# Patient Record
Sex: FEMALE | Race: WHITE | Employment: UNEMPLOYED
[De-identification: names, ages, dates, MRNs, and addresses within clinical notes are randomized per-mention and may not be internally consistent; named-entity substitution may affect disease eponyms.]

---

## 2017-02-22 ENCOUNTER — TELEPHONE (OUTPATIENT)
Dept: INTERNAL MEDICINE | Facility: CLINIC | Age: 57
End: 2017-02-22

## 2017-02-22 DIAGNOSIS — I50.20 SYSTOLIC CONGESTIVE HEART FAILURE, UNSPECIFIED CONGESTIVE HEART FAILURE CHRONICITY: ICD-10-CM

## 2017-02-22 DIAGNOSIS — Z86.79 H/O ESSENTIAL HYPERTENSION: Primary | ICD-10-CM

## 2017-10-16 ENCOUNTER — OFFICE VISIT (OUTPATIENT)
Dept: INTERNAL MEDICINE | Age: 57
End: 2017-10-16
Payer: COMMERCIAL

## 2017-10-16 VITALS
RESPIRATION RATE: 12 BRPM | HEART RATE: 100 BPM | BODY MASS INDEX: 18.55 KG/M2 | SYSTOLIC BLOOD PRESSURE: 192 MMHG | OXYGEN SATURATION: 98 % | DIASTOLIC BLOOD PRESSURE: 105 MMHG | WEIGHT: 100.8 LBS | HEIGHT: 62 IN

## 2017-10-16 DIAGNOSIS — I67.9: ICD-10-CM

## 2017-10-16 DIAGNOSIS — Z12.11 SCREENING FOR COLON CANCER: ICD-10-CM

## 2017-10-16 DIAGNOSIS — N39.41 URINARY INCONTINENCE, URGE: ICD-10-CM

## 2017-10-16 DIAGNOSIS — Z13.220 SCREENING FOR HYPERLIPIDEMIA: ICD-10-CM

## 2017-10-16 DIAGNOSIS — G81.91: ICD-10-CM

## 2017-10-16 DIAGNOSIS — Z86.79 H/O ESSENTIAL HYPERTENSION: ICD-10-CM

## 2017-10-16 DIAGNOSIS — I63.9 CEREBRAL INFARCTION, UNSPECIFIED MECHANISM (HCC): Primary | ICD-10-CM

## 2017-10-16 DIAGNOSIS — Z12.31 ENCOUNTER FOR SCREENING MAMMOGRAM FOR BREAST CANCER: ICD-10-CM

## 2017-10-16 DIAGNOSIS — Z00.00 WELL ADULT EXAM: ICD-10-CM

## 2017-10-16 PROCEDURE — 99214 OFFICE O/P EST MOD 30 MIN: CPT | Performed by: INTERNAL MEDICINE

## 2017-10-16 RX ORDER — METOPROLOL SUCCINATE 50 MG/1
50 TABLET, EXTENDED RELEASE ORAL DAILY
Qty: 30 TABLET | Refills: 3 | Status: SHIPPED | OUTPATIENT
Start: 2017-10-16 | End: 2018-02-12 | Stop reason: SDUPTHER

## 2017-10-16 RX ORDER — GABAPENTIN 300 MG/1
300 CAPSULE ORAL 3 TIMES DAILY
Qty: 90 CAPSULE | Refills: 3 | Status: ON HOLD | OUTPATIENT
Start: 2017-10-16 | End: 2018-09-08

## 2017-10-16 RX ORDER — UNDERPADS 23" X 36"
EACH MISCELLANEOUS
Qty: 5 PACKAGE | Refills: 11 | Status: ON HOLD | OUTPATIENT
Start: 2017-10-16 | End: 2018-09-08

## 2017-10-16 ASSESSMENT — PATIENT HEALTH QUESTIONNAIRE - PHQ9
2. FEELING DOWN, DEPRESSED OR HOPELESS: 0
SUM OF ALL RESPONSES TO PHQ9 QUESTIONS 1 & 2: 0
SUM OF ALL RESPONSES TO PHQ QUESTIONS 1-9: 0
1. LITTLE INTEREST OR PLEASURE IN DOING THINGS: 0

## 2017-10-16 ASSESSMENT — ENCOUNTER SYMPTOMS
EYES NEGATIVE: 1
ALLERGIC/IMMUNOLOGIC NEGATIVE: 1

## 2017-10-16 NOTE — PROGRESS NOTES
[facility] INTERNAL MEDICINE [place] [address] [address]  [address]  [address] [address]  Dept: [phone]  Dept Fax: [phone]    Katlin Gross is a 49 y.o. female who presents today for   Chief Complaint   Patient presents with    Hypertension     BR Check     and follow up of chronic medical problems:   Patient Active Problem List   Diagnosis    Cerebral infarction ([facility]    Somnolence    CHF (congestive heart failure) ([facility]    Heroin overdose    Lactic acidosis    Hyperglycemia    Acute respiratory failure ([facility]    H/O essential hypertension    Smoker    Seizure-like activity (HCC)    Bilious vomiting with nausea   . Past Medical History:   Diagnosis Date    CAD (coronary artery disease)     CHF (congestive heart failure) (HCC)     Hypertension     Unspecified cerebral artery occlusion with cerebral infarction ([facility]        No past surgical history on file. No family history on file.     Social History   Substance Use Topics    Smoking status: Current Every Day Smoker     Packs/day: 2.00     Types: Cigarettes    Smokeless tobacco: Not on file    Alcohol use No      Current Outpatient Prescriptions   Medication Sig Dispense Refill    metoprolol succinate (TOPROL XL) 25 MG extended release tablet Take 0.5 tablets by mouth daily 30 tablet 3    docusate (COLACE, DULCOLAX) 100 MG CAPS Take 100 mg by mouth 2 times daily as needed (constipation) 60 capsule 3    levETIRAcetam (KEPPRA) 500 MG tablet Take 1 tablet by mouth 2 times daily 60 tablet 3    famotidine (PEPCID) 20 MG tablet Take 1 tablet by mouth daily 30 tablet 3    atorvastatin (LIPITOR) 80 MG tablet Take 0.5 tablets by mouth nightly 30 tablet 3    metFORMIN (GLUCOPHAGE) 500 MG tablet Take 1 tablet by mouth daily (with breakfast) 30 tablet 3    aspirin 81 MG chewable tablet Take 1 tablet by mouth daily 30 tablet 3    oxybutynin (DITROPAN-XL) 5 MG CR tablet Take 1 tablet by mouth nightly 30 tablet 3     No current facility-administered medications for this visit. No Known Allergies    Health Maintenance   Topic Date Due    Diabetic foot exam  11/18/1972    Diabetic microalbuminuria test  11/18/1980    Pneumococcal med risk (1 of 1 - PPSV23) 11/18/1981    Breast cancer screen  11/18/2012    Colon cancer screen colonoscopy  11/18/2012    Lipid screen  07/17/2016    Diabetic hemoglobin A1C test  02/07/2017    Flu vaccine (1) 09/01/2017    Diabetic retinal exam  10/13/2018 (Originally 11/18/1972)    DTaP/Tdap/Td vaccine (1 - Tdap) 10/13/2018 (Originally 11/18/1981)    Cervical cancer screen  10/13/2018 (Originally 11/18/1983)    HIV screen  10/13/2018 (Originally 11/18/1977)    Hepatitis C screen  Completed       Controlled Substances Monitoring:      HPI:     Hypertension   This is a chronic problem. The current episode started more than 1 year ago. The problem has been gradually worsening since onset. The problem is uncontrolled. There are no associated agents to hypertension. Risk factors for coronary artery disease include dyslipidemia. Past treatments include beta blockers. The current treatment provides no improvement. Compliance problems include psychosocial issues (non compliant with visits). Hypertensive end-organ damage includes CVA. Arm Pain    The incident occurred more than 1 week ago (3 months). There was no injury mechanism (old CVA with right hemiparesis). The pain is present in the right hand. Quality: unable to describe. The pain does not radiate. The pain is moderate. The pain has been constant since the incident. Associated symptoms include muscle weakness. Associated symptoms comments: See above. . The symptoms are aggravated by palpation. She has tried NSAIDs and acetaminophen for the symptoms. The treatment provided no relief. Stopped all her medications because they made her feel bad. Has not been seen in a year. Incontinent of urine. Urge type.     ROS:     Review of Systems   Constitutional: Negative. Eyes: Negative. Endocrine: Negative. Allergic/Immunologic: Negative. All other systems reviewed and are negative. Objective:     Physical Exam   Constitutional: She appears well-developed and well-nourished. She appears lethargic. HENT:   Head: Normocephalic and atraumatic. Neck: Neck supple. Cardiovascular: Normal rate and regular rhythm. Pulmonary/Chest: Effort normal and breath sounds normal.   Abdominal: Soft. Musculoskeletal: She exhibits no edema. Neurological: She appears lethargic. She displays atrophy. She exhibits abnormal muscle tone (right arm ). Gait abnormal.   Reflex Scores:       Tricep reflexes are 4+ on the right side and 2+ on the left side. Bicep reflexes are 4+ on the right side and 2+ on the left side. Brachioradialis reflexes are 3+ on the right side and 2+ on the left side. Possible  Aphasia. Friend does the talking. Right arm is atrophic. Minimal contractures. Skin: Skin is warm and dry. Psychiatric: She has a normal mood and affect. Her behavior is normal.   Vitals reviewed. Visit Information    Have you changed or started any medications since your last visit including any over-the-counter medicines, vitamins, or herbal medicines? no   Are you having any side effects from any of your medications? -  no  Have you stopped taking any of your medications? Is so, why? -  no    Have you seen any other physician or provider since your last visit? No  Have you had any other diagnostic tests since your last visit? No  Have you been seen in the emergency room and/or had an admission to a hospital since we last saw you? No  Have you had your routine dental cleaning in the past 6 months? yes -     Have you activated your [employer] account? If not, what are your barriers?  Yes     Patient Care Team:  Yenny Esqueda MD as PCP - General (Internal Medicine)    Medical History Review  Past Medical, Family, and Social History reviewed and does contribute to the patient presenting condition    Health Maintenance   Topic Date Due    Diabetic foot exam  11/18/1972    Diabetic microalbuminuria test  11/18/1980    Pneumococcal med risk (1 of 1 - PPSV23) 11/18/1981    Breast cancer screen  11/18/2012    Colon cancer screen colonoscopy  11/18/2012    Lipid screen  07/17/2016    Diabetic hemoglobin A1C test  02/07/2017    Flu vaccine (1) 09/01/2017    Diabetic retinal exam  10/13/2018 (Originally 11/18/1972)    DTaP/Tdap/Td vaccine (1 - Tdap) 10/13/2018 (Originally 11/18/1981)    Cervical cancer screen  10/13/2018 (Originally 11/18/1983)    HIV screen  10/13/2018 (Originally 11/18/1977)    Hepatitis C screen  Completed     BP (!) 192/105   Pulse 100   Resp 12   Ht 5' 2\" (1.575 m)   Wt 100 lb 12.8 oz (45.7 kg)   LMP  (LMP Unknown)   SpO2 98%   Breastfeeding? No   BMI 18.44 kg/m²     Assessment:      1. Well adult exam  CANCELED: HM DIABETES FOOT EXAM    CANCELED: POCT glycosylated hemoglobin (Hb A1C)    CANCELED: Microalbumin, Ur   2. Encounter for screening mammogram for breast cancer  SIMBA Digital Screen Bilateral [[id number]]   3. Screening for colon cancer  POCT FECAL IMMUNOCHEMICAL TEST (FIT)   4. Screening for hyperlipidemia  Lipid Panel       Plan:       1. Chucky Mi received counseling on the following healthy behaviors: medication adherence    2. Prior labs and health maintenance reviewed. 3.  Discussed use, benefit, and side effects of prescribed medications. Barriers to medication compliance addressed. All her questions were answered. Pt voiced understanding. Chucky Mi will continue current medications, diet and exercise. No orders of the defined types were placed in this encounter. Completed Refills               Requested Prescriptions      No prescriptions requested or ordered in this encounter       4. Patient given educational materials - see patient instructions    5.  Was a self-tracking handout given in paper form or via [email or web address]? No  If yes, see orders or list here. Orders Placed This Encounter   Procedures    SIMBA Digital Screen Bilateral [id number]     Standing Status:   Future     Standing Expiration Date:   10/13/2018     Order Specific Question:   Reason for exam:     Answer:   breast cancer screening    Lipid Panel     Standing Status:   Future     Standing Expiration Date:   10/13/2018     Order Specific Question:   Is Patient Fasting?/# of Hours     Answer:   12 hours    POCT FECAL IMMUNOCHEMICAL TEST (FIT)     Standing Status:   Future     Standing Expiration Date:   10/13/2018       No Follow-up on file. Patient agreed with treatment plan.     Electronically signed by Alex Rodriguez MD on 10/16/2017 at 2:12 PM

## 2017-10-25 ENCOUNTER — TELEPHONE (OUTPATIENT)
Dept: INTERNAL MEDICINE | Age: 57
End: 2017-10-25

## 2017-10-25 RX ORDER — ACETAMINOPHEN AND CODEINE PHOSPHATE 300; 30 MG/1; MG/1
1 TABLET ORAL EVERY 6 HOURS PRN
Qty: 40 TABLET | Refills: 0 | Status: ON HOLD | OUTPATIENT
Start: 2017-10-25 | End: 2018-09-08

## 2017-10-25 NOTE — TELEPHONE ENCOUNTER
Patient care giver is asking for stronger pain medication than Gabapentin RX, Care giver states that she does have appt with neuro at the end of December, but hates seeing patient in so much pain, Please Advise          Health Maintenance   Topic Date Due    Breast cancer screen  11/18/2012    Lipid screen  07/17/2016    Flu vaccine (1) 01/16/2018 (Originally 9/1/2017)    Diabetic foot exam  04/16/2018 (Originally 11/18/1972)    Pneumococcal med risk (1 of 1 - PPSV23) 04/16/2018 (Originally 11/18/1981)    Colon cancer screen colonoscopy  04/16/2018 (Originally 11/18/2012)    Diabetic retinal exam  10/13/2018 (Originally 11/18/1972)    DTaP/Tdap/Td vaccine (1 - Tdap) 10/13/2018 (Originally 11/18/1981)    Cervical cancer screen  10/13/2018 (Originally 11/18/1983)    HIV screen  10/13/2018 (Originally 11/18/1977)    Diabetic hemoglobin A1C test  10/16/2018 (Originally 2/7/2017)    Diabetic microalbuminuria test  10/16/2018 (Originally 11/18/1980)    Hepatitis C screen  Completed             (applicable per patient's age: Cancer Screenings, Depression Screening, Fall Risk Screening, Immunizations)    Hemoglobin A1C (%)   Date Value   02/07/2016 6.0   07/17/2015 5.9     LDL Cholesterol (mg/dL)   Date Value   07/17/2015 78     AST (U/L)   Date Value   09/19/2016 23     ALT (U/L)   Date Value   09/19/2016 10     BUN (mg/dL)   Date Value   09/19/2016 12      (goal A1C is < 7)   (goal LDL is <100) need 30-50% reduction from baseline     BP Readings from Last 3 Encounters:   10/16/17 (!) 192/105   09/15/16 161/90   04/19/16 131/72    (goal /80)      All Future Testing planned in [facility]:  Lab Frequency Next Occurrence   SIMBA Digital Screen Bilateral [[id number]] Once 11/12/2017   POCT FECAL IMMUNOCHEMICAL TEST (FIT) Once 11/12/2017   Lipid Panel Once 11/12/2017       Next Visit Date:  Future Appointments  Date Time Provider Evita Caban   12/21/2017 1:00 PM Irving Villa MD Neuro Spec Cori Levo

## 2017-11-08 RX ORDER — ASPIRIN 81 MG/1
81 TABLET, CHEWABLE ORAL DAILY
Qty: 30 TABLET | Refills: 3 | Status: ON HOLD | OUTPATIENT
Start: 2017-11-08 | End: 2018-09-08

## 2018-01-09 ENCOUNTER — TELEPHONE (OUTPATIENT)
Dept: INTERNAL MEDICINE | Age: 58
End: 2018-01-09

## 2018-01-09 DIAGNOSIS — Z86.79 H/O ESSENTIAL HYPERTENSION: ICD-10-CM

## 2018-01-09 DIAGNOSIS — R73.9 HYPERGLYCEMIA: Primary | ICD-10-CM

## 2018-01-09 NOTE — LETTER
[facility] Internal Medicine [facility] [address]  [address] [place] [address] [address] [place] [address] [address]  Phone: [phone]  Fax: [phone]    Caro Langley MD        January 9, 2018     [address] [place] [id number]      Dear Kerry Mcbride: This letter is a reminder that your Hgb A1C test is due. Please call our office to schedule an appointment. If you've already underwent or scheduled this procedure, please disregard this notice.     Sincerely,        Caro Langley MD

## 2018-02-12 NOTE — TELEPHONE ENCOUNTER
Next Visit Date:  No future appointments.     Health Maintenance   Topic Date Due    Breast cancer screen  11/18/2012    Lipid screen  07/17/2016    Flu vaccine (1) 09/01/2017    Smoker: low dose lung CT screening  11/18/2017    Diabetic foot exam  04/16/2018 (Originally 11/18/1972)    Pneumococcal med risk (1 of 1 - PPSV23) 04/16/2018 (Originally 11/18/1981)    Colon cancer screen colonoscopy  04/16/2018 (Originally 11/18/2012)    Diabetic retinal exam  10/13/2018 (Originally 11/18/1972)    DTaP/Tdap/Td vaccine (1 - Tdap) 10/13/2018 (Originally 11/18/1981)    Cervical cancer screen  10/13/2018 (Originally 11/18/1983)    HIV screen  10/13/2018 (Originally 11/18/1977)    A1C test (Diabetic or Prediabetic)  10/16/2018 (Originally 2/7/2017)    Diabetic microalbuminuria test  10/16/2018 (Originally 11/18/1980)    Hepatitis C screen  Completed       Hemoglobin A1C (%)   Date Value   02/07/2016 6.0   07/17/2015 5.9             ( goal A1C is < 7)   No results found for: LABMICR  LDL Cholesterol (mg/dL)   Date Value   07/17/2015 78       (goal LDL is <100)   AST (U/L)   Date Value   09/19/2016 23     ALT (U/L)   Date Value   09/19/2016 10     BUN (mg/dL)   Date Value   09/19/2016 12     BP Readings from Last 3 Encounters:   10/16/17 (!) 192/105   09/15/16 161/90   04/19/16 131/72          (goal 120/80)    All Future Testing planned in [facility]  Lab Frequency Next Occurrence   SIMBA Digital Screen Bilateral [[id number]] Once 06/27/2018   POCT FECAL IMMUNOCHEMICAL TEST (FIT) Once 06/28/2018   Lipid Panel Once 09/13/2018   Hemoglobin A1C Once 04/19/2018   Microalbumin, Ur Once 04/19/2018               Patient Active Problem List:     Cerebral infarction ([address]     Somnolence     CHF (congestive heart failure) (HCC)     Heroin overdose     Lactic acidosis     Hyperglycemia     Acute respiratory failure (HCC)     H/O essential hypertension     Smoker     Seizure-like activity (HCC)     Bilious vomiting with nausea

## 2018-02-13 RX ORDER — METOPROLOL SUCCINATE 50 MG/1
50 TABLET, EXTENDED RELEASE ORAL DAILY
Qty: 30 TABLET | Refills: 3 | Status: ON HOLD | OUTPATIENT
Start: 2018-02-13 | End: 2018-09-08

## 2018-02-15 RX ORDER — METOPROLOL SUCCINATE 50 MG/1
TABLET, EXTENDED RELEASE ORAL
Qty: 30 TABLET | Refills: 3 | Status: SHIPPED | OUTPATIENT
Start: 2018-02-15 | End: 2018-06-22 | Stop reason: SDUPTHER

## 2018-06-22 RX ORDER — METOPROLOL SUCCINATE 50 MG/1
TABLET, EXTENDED RELEASE ORAL
Qty: 30 TABLET | Refills: 3 | Status: ON HOLD | OUTPATIENT
Start: 2018-06-22 | End: 2018-09-08

## 2018-09-02 ENCOUNTER — APPOINTMENT (OUTPATIENT)
Dept: GENERAL RADIOLOGY | Age: 58
DRG: 816 | End: 2018-09-02
Payer: COMMERCIAL

## 2018-09-02 ENCOUNTER — APPOINTMENT (OUTPATIENT)
Dept: CT IMAGING | Age: 58
DRG: 816 | End: 2018-09-02
Payer: COMMERCIAL

## 2018-09-02 ENCOUNTER — HOSPITAL ENCOUNTER (INPATIENT)
Age: 58
LOS: 7 days | Discharge: HOSPICE | DRG: 816 | End: 2018-09-09
Attending: EMERGENCY MEDICINE | Admitting: PSYCHIATRY & NEUROLOGY
Payer: COMMERCIAL

## 2018-09-02 DIAGNOSIS — T50.904A DRUG OVERDOSE, UNDETERMINED INTENT, INITIAL ENCOUNTER: ICD-10-CM

## 2018-09-02 DIAGNOSIS — I63.9 CEREBROVASCULAR ACCIDENT (CVA), UNSPECIFIED MECHANISM (HCC): Primary | ICD-10-CM

## 2018-09-02 LAB
% CKMB: 3.2 % (ref 0–3)
-: ABNORMAL
ABSOLUTE EOS #: <0.03 K/UL (ref 0–0.44)
ABSOLUTE EOS #: <0.03 K/UL (ref 0–0.44)
ABSOLUTE IMMATURE GRANULOCYTE: 0.08 K/UL (ref 0–0.3)
ABSOLUTE IMMATURE GRANULOCYTE: 0.12 K/UL (ref 0–0.3)
ABSOLUTE LYMPH #: 1.24 K/UL (ref 1.1–3.7)
ABSOLUTE LYMPH #: 1.24 K/UL (ref 1.1–3.7)
ABSOLUTE MONO #: 0.87 K/UL (ref 0.1–1.2)
ABSOLUTE MONO #: 0.92 K/UL (ref 0.1–1.2)
ALBUMIN SERPL-MCNC: 3.9 G/DL (ref 3.5–5.2)
ALBUMIN/GLOBULIN RATIO: 1.1 (ref 1–2.5)
ALLEN TEST: ABNORMAL
ALLEN TEST: POSITIVE
ALP BLD-CCNC: 101 U/L (ref 35–104)
ALT SERPL-CCNC: 6 U/L (ref 5–33)
AMORPHOUS: ABNORMAL
ANION GAP SERPL CALCULATED.3IONS-SCNC: 20 MMOL/L (ref 9–17)
ANION GAP: 13 MMOL/L (ref 7–16)
AST SERPL-CCNC: 14 U/L
BACTERIA: ABNORMAL
BASOPHILS # BLD: 0 % (ref 0–2)
BASOPHILS # BLD: 0 % (ref 0–2)
BASOPHILS ABSOLUTE: 0.03 K/UL (ref 0–0.2)
BASOPHILS ABSOLUTE: 0.05 K/UL (ref 0–0.2)
BILIRUB SERPL-MCNC: 0.69 MG/DL (ref 0.3–1.2)
BILIRUBIN DIRECT: 0.21 MG/DL
BILIRUBIN URINE: NEGATIVE
BILIRUBIN, INDIRECT: 0.48 MG/DL (ref 0–1)
BUN BLDV-MCNC: 11 MG/DL (ref 6–20)
BUN/CREAT BLD: ABNORMAL (ref 9–20)
CALCIUM SERPL-MCNC: 9.3 MG/DL (ref 8.6–10.4)
CASTS UA: ABNORMAL /LPF (ref 0–8)
CHLORIDE BLD-SCNC: 98 MMOL/L (ref 98–107)
CK MB: 3.1 NG/ML
CKMB INTERPRETATION: ABNORMAL
CO2: 17 MMOL/L (ref 20–31)
COLOR: YELLOW
CREAT SERPL-MCNC: 0.65 MG/DL (ref 0.5–0.9)
CRYSTALS, UA: ABNORMAL /HPF
DIFFERENTIAL TYPE: ABNORMAL
DIFFERENTIAL TYPE: ABNORMAL
EKG ATRIAL RATE: 111 BPM
EKG P AXIS: 85 DEGREES
EKG P-R INTERVAL: 162 MS
EKG Q-T INTERVAL: 354 MS
EKG QRS DURATION: 94 MS
EKG QTC CALCULATION (BAZETT): 481 MS
EKG R AXIS: 44 DEGREES
EKG T AXIS: 99 DEGREES
EKG VENTRICULAR RATE: 111 BPM
EOSINOPHILS RELATIVE PERCENT: 0 % (ref 1–4)
EOSINOPHILS RELATIVE PERCENT: 0 % (ref 1–4)
EPITHELIAL CELLS UA: ABNORMAL /HPF (ref 0–5)
FIO2: 30
FIO2: ABNORMAL
GFR AFRICAN AMERICAN: >60 ML/MIN
GFR NON-AFRICAN AMERICAN: >60 ML/MIN
GFR NON-AFRICAN AMERICAN: >60 ML/MIN
GFR SERPL CREATININE-BSD FRML MDRD: >60 ML/MIN
GFR SERPL CREATININE-BSD FRML MDRD: ABNORMAL ML/MIN/{1.73_M2}
GFR SERPL CREATININE-BSD FRML MDRD: ABNORMAL ML/MIN/{1.73_M2}
GFR SERPL CREATININE-BSD FRML MDRD: NORMAL ML/MIN/{1.73_M2}
GLOBULIN: NORMAL G/DL (ref 1.5–3.8)
GLUCOSE BLD-MCNC: 296 MG/DL (ref 70–99)
GLUCOSE BLD-MCNC: 317 MG/DL (ref 74–100)
GLUCOSE URINE: ABNORMAL
HAV IGM SER IA-ACNC: ABNORMAL
HCO3 VENOUS: 24.8 MMOL/L (ref 22–29)
HCT VFR BLD CALC: 44.6 % (ref 36.3–47.1)
HCT VFR BLD CALC: 44.6 % (ref 36.3–47.1)
HEMOGLOBIN: 14.9 G/DL (ref 11.9–15.1)
HEMOGLOBIN: 15 G/DL (ref 11.9–15.1)
HEPATITIS B CORE IGM ANTIBODY: NONREACTIVE
HEPATITIS B SURFACE ANTIGEN: NONREACTIVE
HEPATITIS C ANTIBODY: REACTIVE
IMMATURE GRANULOCYTES: 0 %
IMMATURE GRANULOCYTES: 1 %
INR BLD: 1.1
INR BLD: 1.1
KEPPRA: <2 UG/ML
KETONES, URINE: NEGATIVE
LACTIC ACID, WHOLE BLOOD: 4.5 MMOL/L (ref 0.7–2.1)
LACTIC ACID: ABNORMAL MMOL/L
LEUKOCYTE ESTERASE, URINE: ABNORMAL
LYMPHOCYTES # BLD: 6 % (ref 24–43)
LYMPHOCYTES # BLD: 6 % (ref 24–43)
MCH RBC QN AUTO: 31.6 PG (ref 25.2–33.5)
MCH RBC QN AUTO: 31.7 PG (ref 25.2–33.5)
MCHC RBC AUTO-ENTMCNC: 33.4 G/DL (ref 28.4–34.8)
MCHC RBC AUTO-ENTMCNC: 33.6 G/DL (ref 28.4–34.8)
MCV RBC AUTO: 93.9 FL (ref 82.6–102.9)
MCV RBC AUTO: 94.9 FL (ref 82.6–102.9)
MODE: ABNORMAL
MODE: ABNORMAL
MONOCYTES # BLD: 4 % (ref 3–12)
MONOCYTES # BLD: 5 % (ref 3–12)
MUCUS: ABNORMAL
MYOGLOBIN: 245 NG/ML (ref 25–58)
NEGATIVE BASE EXCESS, ART: 3 (ref 0–2)
NEGATIVE BASE EXCESS, VEN: 2 (ref 0–2)
NITRITE, URINE: NEGATIVE
NRBC AUTOMATED: 0 PER 100 WBC
NRBC AUTOMATED: 0 PER 100 WBC
O2 DEVICE/FLOW/%: ABNORMAL
O2 DEVICE/FLOW/%: ABNORMAL
O2 SAT, VEN: 49 % (ref 60–85)
OTHER OBSERVATIONS UA: ABNORMAL
PARTIAL THROMBOPLASTIN TIME: 21.4 SEC (ref 20.5–30.5)
PARTIAL THROMBOPLASTIN TIME: 22.2 SEC (ref 20.5–30.5)
PATIENT TEMP: ABNORMAL
PATIENT TEMP: ABNORMAL
PCO2, VEN: 48.4 MM HG (ref 41–51)
PDW BLD-RTO: 13.3 % (ref 11.8–14.4)
PDW BLD-RTO: 13.4 % (ref 11.8–14.4)
PH UA: 6.5 (ref 5–8)
PH VENOUS: 7.32 (ref 7.32–7.43)
PLATELET # BLD: 302 K/UL (ref 138–453)
PLATELET # BLD: 308 K/UL (ref 138–453)
PLATELET ESTIMATE: ABNORMAL
PLATELET ESTIMATE: ABNORMAL
PMV BLD AUTO: 9.6 FL (ref 8.1–13.5)
PMV BLD AUTO: 9.7 FL (ref 8.1–13.5)
PO2, VEN: 28.9 MM HG (ref 30–50)
POC CHLORIDE: 102 MMOL/L (ref 98–107)
POC CREATININE: 0.63 MG/DL (ref 0.51–1.19)
POC HCO3: 20.8 MMOL/L (ref 21–28)
POC HEMATOCRIT: 52 % (ref 36–46)
POC HEMOGLOBIN: 17.6 G/DL (ref 12–16)
POC IONIZED CALCIUM: 1.22 MMOL/L (ref 1.15–1.33)
POC LACTIC ACID: 5.33 MMOL/L (ref 0.56–1.39)
POC O2 SATURATION: 98 % (ref 94–98)
POC PCO2 TEMP: ABNORMAL MM HG
POC PCO2 TEMP: ABNORMAL MM HG
POC PCO2: 32.2 MM HG (ref 35–48)
POC PH TEMP: ABNORMAL
POC PH TEMP: ABNORMAL
POC PH: 7.42 (ref 7.35–7.45)
POC PO2 TEMP: ABNORMAL MM HG
POC PO2 TEMP: ABNORMAL MM HG
POC PO2: 106.8 MM HG (ref 83–108)
POC POTASSIUM: 3.4 MMOL/L (ref 3.5–4.5)
POC SODIUM: 140 MMOL/L (ref 138–146)
POC TROPONIN I: 0.05 NG/ML (ref 0–0.1)
POC TROPONIN INTERP: NORMAL
POSITIVE BASE EXCESS, ART: ABNORMAL (ref 0–3)
POSITIVE BASE EXCESS, VEN: ABNORMAL (ref 0–3)
POTASSIUM SERPL-SCNC: 3.5 MMOL/L (ref 3.7–5.3)
PROTEIN UA: NEGATIVE
PROTHROMBIN TIME: 11.2 SEC (ref 9–12)
PROTHROMBIN TIME: 11.3 SEC (ref 9–12)
RBC # BLD: 4.7 M/UL (ref 3.95–5.11)
RBC # BLD: 4.75 M/UL (ref 3.95–5.11)
RBC # BLD: ABNORMAL 10*6/UL
RBC # BLD: ABNORMAL 10*6/UL
RBC UA: ABNORMAL /HPF (ref 0–4)
RENAL EPITHELIAL, UA: ABNORMAL /HPF
SAMPLE SITE: ABNORMAL
SAMPLE SITE: ABNORMAL
SEG NEUTROPHILS: 89 % (ref 36–65)
SEG NEUTROPHILS: 89 % (ref 36–65)
SEGMENTED NEUTROPHILS ABSOLUTE COUNT: 17.74 K/UL (ref 1.5–8.1)
SEGMENTED NEUTROPHILS ABSOLUTE COUNT: 17.89 K/UL (ref 1.5–8.1)
SERUM OSMOLALITY: 303 MOSM/KG (ref 275–295)
SODIUM BLD-SCNC: 135 MMOL/L (ref 135–144)
SPECIFIC GRAVITY UA: 1.02 (ref 1–1.03)
TCO2 (CALC), ART: 22 MMOL/L (ref 22–29)
TOTAL CK: 98 U/L (ref 26–192)
TOTAL CO2, VENOUS: 26 MMOL/L (ref 23–30)
TOTAL PROTEIN: 7.6 G/DL (ref 6.4–8.3)
TRICHOMONAS: ABNORMAL
TROPONIN INTERP: ABNORMAL
TROPONIN T: <0.03 NG/ML
TURBIDITY: CLEAR
URINE HGB: ABNORMAL
UROBILINOGEN, URINE: NORMAL
WBC # BLD: 20 K/UL (ref 3.5–11.3)
WBC # BLD: 20.2 K/UL (ref 3.5–11.3)
WBC # BLD: ABNORMAL 10*3/UL
WBC # BLD: ABNORMAL 10*3/UL
WBC UA: ABNORMAL /HPF (ref 0–5)
YEAST: ABNORMAL

## 2018-09-02 PROCEDURE — 2500000003 HC RX 250 WO HCPCS: Performed by: EMERGENCY MEDICINE

## 2018-09-02 PROCEDURE — 85610 PROTHROMBIN TIME: CPT

## 2018-09-02 PROCEDURE — 71045 X-RAY EXAM CHEST 1 VIEW: CPT

## 2018-09-02 PROCEDURE — 85014 HEMATOCRIT: CPT

## 2018-09-02 PROCEDURE — 83880 ASSAY OF NATRIURETIC PEPTIDE: CPT

## 2018-09-02 PROCEDURE — 83874 ASSAY OF MYOGLOBIN: CPT

## 2018-09-02 PROCEDURE — 94762 N-INVAS EAR/PLS OXIMTRY CONT: CPT

## 2018-09-02 PROCEDURE — 6360000002 HC RX W HCPCS

## 2018-09-02 PROCEDURE — 6360000002 HC RX W HCPCS: Performed by: EMERGENCY MEDICINE

## 2018-09-02 PROCEDURE — 99291 CRITICAL CARE FIRST HOUR: CPT

## 2018-09-02 PROCEDURE — 85730 THROMBOPLASTIN TIME PARTIAL: CPT

## 2018-09-02 PROCEDURE — 81001 URINALYSIS AUTO W/SCOPE: CPT

## 2018-09-02 PROCEDURE — 96375 TX/PRO/DX INJ NEW DRUG ADDON: CPT

## 2018-09-02 PROCEDURE — 82553 CREATINE MB FRACTION: CPT

## 2018-09-02 PROCEDURE — 5A1955Z RESPIRATORY VENTILATION, GREATER THAN 96 CONSECUTIVE HOURS: ICD-10-PCS | Performed by: PSYCHIATRY & NEUROLOGY

## 2018-09-02 PROCEDURE — 6360000004 HC RX CONTRAST MEDICATION: Performed by: EMERGENCY MEDICINE

## 2018-09-02 PROCEDURE — 2580000003 HC RX 258: Performed by: EMERGENCY MEDICINE

## 2018-09-02 PROCEDURE — 0BH18EZ INSERTION OF ENDOTRACHEAL AIRWAY INTO TRACHEA, VIA NATURAL OR ARTIFICIAL OPENING ENDOSCOPIC: ICD-10-PCS | Performed by: EMERGENCY MEDICINE

## 2018-09-02 PROCEDURE — 84484 ASSAY OF TROPONIN QUANT: CPT

## 2018-09-02 PROCEDURE — 94770 HC ETCO2 MONITOR DAILY: CPT

## 2018-09-02 PROCEDURE — 70450 CT HEAD/BRAIN W/O DYE: CPT

## 2018-09-02 PROCEDURE — 36600 WITHDRAWAL OF ARTERIAL BLOOD: CPT

## 2018-09-02 PROCEDURE — 80048 BASIC METABOLIC PNL TOTAL CA: CPT

## 2018-09-02 PROCEDURE — 82550 ASSAY OF CK (CPK): CPT

## 2018-09-02 PROCEDURE — 36680 INSERT NEEDLE BONE CAVITY: CPT

## 2018-09-02 PROCEDURE — 94002 VENT MGMT INPAT INIT DAY: CPT

## 2018-09-02 PROCEDURE — 82803 BLOOD GASES ANY COMBINATION: CPT

## 2018-09-02 PROCEDURE — 93005 ELECTROCARDIOGRAM TRACING: CPT

## 2018-09-02 PROCEDURE — 31500 INSERT EMERGENCY AIRWAY: CPT

## 2018-09-02 PROCEDURE — 87040 BLOOD CULTURE FOR BACTERIA: CPT

## 2018-09-02 PROCEDURE — 82947 ASSAY GLUCOSE BLOOD QUANT: CPT

## 2018-09-02 PROCEDURE — 80177 DRUG SCRN QUAN LEVETIRACETAM: CPT

## 2018-09-02 PROCEDURE — 80076 HEPATIC FUNCTION PANEL: CPT

## 2018-09-02 PROCEDURE — 70496 CT ANGIOGRAPHY HEAD: CPT

## 2018-09-02 PROCEDURE — 84295 ASSAY OF SERUM SODIUM: CPT

## 2018-09-02 PROCEDURE — 70498 CT ANGIOGRAPHY NECK: CPT

## 2018-09-02 PROCEDURE — 2500000003 HC RX 250 WO HCPCS

## 2018-09-02 PROCEDURE — 36556 INSERT NON-TUNNEL CV CATH: CPT

## 2018-09-02 PROCEDURE — 51702 INSERT TEMP BLADDER CATH: CPT

## 2018-09-02 PROCEDURE — 82435 ASSAY OF BLOOD CHLORIDE: CPT

## 2018-09-02 PROCEDURE — 80074 ACUTE HEPATITIS PANEL: CPT

## 2018-09-02 PROCEDURE — 2000000003 HC NEURO ICU R&B

## 2018-09-02 PROCEDURE — 83930 ASSAY OF BLOOD OSMOLALITY: CPT

## 2018-09-02 PROCEDURE — 82565 ASSAY OF CREATININE: CPT

## 2018-09-02 PROCEDURE — 06HM33Z INSERTION OF INFUSION DEVICE INTO RIGHT FEMORAL VEIN, PERCUTANEOUS APPROACH: ICD-10-PCS | Performed by: EMERGENCY MEDICINE

## 2018-09-02 PROCEDURE — 96376 TX/PRO/DX INJ SAME DRUG ADON: CPT

## 2018-09-02 PROCEDURE — 84132 ASSAY OF SERUM POTASSIUM: CPT

## 2018-09-02 PROCEDURE — 83605 ASSAY OF LACTIC ACID: CPT

## 2018-09-02 PROCEDURE — 85025 COMPLETE CBC W/AUTO DIFF WBC: CPT

## 2018-09-02 PROCEDURE — 96374 THER/PROPH/DIAG INJ IV PUSH: CPT

## 2018-09-02 PROCEDURE — 82330 ASSAY OF CALCIUM: CPT

## 2018-09-02 PROCEDURE — 87086 URINE CULTURE/COLONY COUNT: CPT

## 2018-09-02 PROCEDURE — 99254 IP/OBS CNSLTJ NEW/EST MOD 60: CPT | Performed by: PSYCHIATRY & NEUROLOGY

## 2018-09-02 RX ORDER — FENTANYL CITRATE 50 UG/ML
100 INJECTION, SOLUTION INTRAMUSCULAR; INTRAVENOUS ONCE
Status: COMPLETED | OUTPATIENT
Start: 2018-09-02 | End: 2018-09-02

## 2018-09-02 RX ORDER — NALOXONE HYDROCHLORIDE 1 MG/ML
INJECTION INTRAMUSCULAR; INTRAVENOUS; SUBCUTANEOUS
Status: COMPLETED
Start: 2018-09-02 | End: 2018-09-02

## 2018-09-02 RX ORDER — MIDAZOLAM HYDROCHLORIDE 1 MG/ML
5 INJECTION INTRAMUSCULAR; INTRAVENOUS ONCE
Status: COMPLETED | OUTPATIENT
Start: 2018-09-02 | End: 2018-09-02

## 2018-09-02 RX ORDER — SUCCINYLCHOLINE CHLORIDE 20 MG/ML
100 INJECTION INTRAMUSCULAR; INTRAVENOUS ONCE
Status: COMPLETED | OUTPATIENT
Start: 2018-09-02 | End: 2018-09-02

## 2018-09-02 RX ORDER — ETOMIDATE 2 MG/ML
20 INJECTION INTRAVENOUS ONCE
Status: COMPLETED | OUTPATIENT
Start: 2018-09-02 | End: 2018-09-02

## 2018-09-02 RX ORDER — FENTANYL CITRATE 50 UG/ML
INJECTION, SOLUTION INTRAMUSCULAR; INTRAVENOUS
Status: COMPLETED
Start: 2018-09-02 | End: 2018-09-02

## 2018-09-02 RX ORDER — 0.9 % SODIUM CHLORIDE 0.9 %
30 INTRAVENOUS SOLUTION INTRAVENOUS ONCE
Status: COMPLETED | OUTPATIENT
Start: 2018-09-02 | End: 2018-09-02

## 2018-09-02 RX ORDER — PROPOFOL 10 MG/ML
INJECTION, EMULSION INTRAVENOUS
Status: COMPLETED
Start: 2018-09-02 | End: 2018-09-02

## 2018-09-02 RX ORDER — 0.9 % SODIUM CHLORIDE 0.9 %
1000 INTRAVENOUS SOLUTION INTRAVENOUS ONCE
Status: DISCONTINUED | OUTPATIENT
Start: 2018-09-02 | End: 2018-09-02

## 2018-09-02 RX ORDER — KETAMINE HYDROCHLORIDE 10 MG/ML
0.25 INJECTION, SOLUTION INTRAMUSCULAR; INTRAVENOUS ONCE
Status: DISCONTINUED | OUTPATIENT
Start: 2018-09-02 | End: 2018-09-05

## 2018-09-02 RX ORDER — LABETALOL HYDROCHLORIDE 5 MG/ML
10 INJECTION, SOLUTION INTRAVENOUS ONCE
Status: COMPLETED | OUTPATIENT
Start: 2018-09-03 | End: 2018-09-03

## 2018-09-02 RX ADMIN — AZITHROMYCIN MONOHYDRATE 500 MG: 500 INJECTION, POWDER, LYOPHILIZED, FOR SOLUTION INTRAVENOUS at 23:49

## 2018-09-02 RX ADMIN — FENTANYL CITRATE 100 MCG: 50 INJECTION INTRAMUSCULAR; INTRAVENOUS at 20:00

## 2018-09-02 RX ADMIN — IOPAMIDOL 90 ML: 755 INJECTION, SOLUTION INTRAVENOUS at 21:08

## 2018-09-02 RX ADMIN — MIDAZOLAM HYDROCHLORIDE 2 MG: 1 INJECTION, SOLUTION INTRAMUSCULAR; INTRAVENOUS at 21:30

## 2018-09-02 RX ADMIN — NALOXONE HYDROCHLORIDE 2 MG: 1 INJECTION PARENTERAL at 19:48

## 2018-09-02 RX ADMIN — KETAMINE HYDROCHLORIDE 2 MCG/KG/MIN: 100 INJECTION, SOLUTION, CONCENTRATE INTRAMUSCULAR; INTRAVENOUS at 22:30

## 2018-09-02 RX ADMIN — MIDAZOLAM HYDROCHLORIDE 2 MG: 1 INJECTION, SOLUTION INTRAMUSCULAR; INTRAVENOUS at 21:05

## 2018-09-02 RX ADMIN — PROPOFOL 1000 MG: 10 INJECTION, EMULSION INTRAVENOUS at 20:01

## 2018-09-02 RX ADMIN — FENTANYL CITRATE 100 MCG: 50 INJECTION INTRAMUSCULAR; INTRAVENOUS at 20:17

## 2018-09-02 RX ADMIN — MIDAZOLAM HYDROCHLORIDE 2 MG: 1 INJECTION, SOLUTION INTRAMUSCULAR; INTRAVENOUS at 20:50

## 2018-09-02 RX ADMIN — ETOMIDATE 20 MG: 2 INJECTION INTRAVENOUS at 19:57

## 2018-09-02 RX ADMIN — SODIUM CHLORIDE 1000 ML: 9 INJECTION, SOLUTION INTRAVENOUS at 21:40

## 2018-09-02 RX ADMIN — CEFTRIAXONE SODIUM 1 G: 1 INJECTION, POWDER, FOR SOLUTION INTRAMUSCULAR; INTRAVENOUS at 23:02

## 2018-09-02 RX ADMIN — MIDAZOLAM HYDROCHLORIDE 2 MG: 1 INJECTION, SOLUTION INTRAMUSCULAR; INTRAVENOUS at 22:49

## 2018-09-02 RX ADMIN — SUCCINYLCHOLINE CHLORIDE 100 MG: 20 INJECTION, SOLUTION INTRAMUSCULAR; INTRAVENOUS at 19:58

## 2018-09-02 RX ADMIN — SODIUM CHLORIDE 1224 ML: 9 INJECTION, SOLUTION INTRAVENOUS at 21:39

## 2018-09-02 ASSESSMENT — PULMONARY FUNCTION TESTS
PIF_VALUE: 21
PIF_VALUE: 21

## 2018-09-02 NOTE — ED NOTES
Orally intubated by Dr. Eva Sue, 7.5cm tube, 22cm at teeth, misting in tube, + color change,  no sounds over abdomen, positive breath sounds of bilateral chest.     Bhaskar Kong RN  09/02/18 2000

## 2018-09-02 NOTE — ED NOTES
Bed: 12  Expected date:   Expected time:   Means of arrival:   Comments:     Tai Bryson RN  09/02/18 1950

## 2018-09-03 ENCOUNTER — APPOINTMENT (OUTPATIENT)
Dept: GENERAL RADIOLOGY | Age: 58
DRG: 816 | End: 2018-09-03
Payer: COMMERCIAL

## 2018-09-03 ENCOUNTER — APPOINTMENT (OUTPATIENT)
Dept: MRI IMAGING | Age: 58
DRG: 816 | End: 2018-09-03
Payer: COMMERCIAL

## 2018-09-03 LAB
ABSOLUTE EOS #: <0.03 K/UL (ref 0–0.44)
ABSOLUTE EOS #: <0.03 K/UL (ref 0–0.44)
ABSOLUTE IMMATURE GRANULOCYTE: 0.05 K/UL (ref 0–0.3)
ABSOLUTE IMMATURE GRANULOCYTE: 0.08 K/UL (ref 0–0.3)
ABSOLUTE LYMPH #: 1.54 K/UL (ref 1.1–3.7)
ABSOLUTE LYMPH #: 2.51 K/UL (ref 1.1–3.7)
ABSOLUTE MONO #: 0.85 K/UL (ref 0.1–1.2)
ABSOLUTE MONO #: 1.2 K/UL (ref 0.1–1.2)
ALLEN TEST: POSITIVE
AMPHETAMINE SCREEN URINE: NEGATIVE
ANION GAP SERPL CALCULATED.3IONS-SCNC: 16 MMOL/L (ref 9–17)
BARBITURATE SCREEN URINE: NEGATIVE
BASOPHILS # BLD: 0 % (ref 0–2)
BASOPHILS # BLD: 0 % (ref 0–2)
BASOPHILS ABSOLUTE: <0.03 K/UL (ref 0–0.2)
BASOPHILS ABSOLUTE: <0.03 K/UL (ref 0–0.2)
BENZODIAZEPINE SCREEN, URINE: POSITIVE
BNP INTERPRETATION: ABNORMAL
BUN BLDV-MCNC: 3 MG/DL (ref 6–20)
BUN/CREAT BLD: ABNORMAL (ref 9–20)
BUPRENORPHINE URINE: ABNORMAL
CALCIUM IONIZED: 1.03 MMOL/L (ref 1.13–1.33)
CALCIUM SERPL-MCNC: 7.9 MG/DL (ref 8.6–10.4)
CANNABINOID SCREEN URINE: NEGATIVE
CHLORIDE BLD-SCNC: 109 MMOL/L (ref 98–107)
CHOLESTEROL/HDL RATIO: 2.7
CHOLESTEROL: 135 MG/DL
CO2: 19 MMOL/L (ref 20–31)
COCAINE METABOLITE, URINE: NEGATIVE
CREAT SERPL-MCNC: 0.41 MG/DL (ref 0.5–0.9)
CULTURE: NORMAL
DIFFERENTIAL TYPE: ABNORMAL
DIFFERENTIAL TYPE: ABNORMAL
EOSINOPHILS RELATIVE PERCENT: 0 % (ref 1–4)
EOSINOPHILS RELATIVE PERCENT: 0 % (ref 1–4)
FIO2: 30
GFR AFRICAN AMERICAN: >60 ML/MIN
GFR NON-AFRICAN AMERICAN: >60 ML/MIN
GFR SERPL CREATININE-BSD FRML MDRD: ABNORMAL ML/MIN/{1.73_M2}
GFR SERPL CREATININE-BSD FRML MDRD: ABNORMAL ML/MIN/{1.73_M2}
GLUCOSE BLD-MCNC: 126 MG/DL (ref 65–105)
GLUCOSE BLD-MCNC: 146 MG/DL (ref 65–105)
GLUCOSE BLD-MCNC: 153 MG/DL (ref 65–105)
GLUCOSE BLD-MCNC: 200 MG/DL (ref 70–99)
GLUCOSE BLD-MCNC: 237 MG/DL (ref 65–105)
HCT VFR BLD CALC: 35.7 % (ref 36.3–47.1)
HCT VFR BLD CALC: 43.5 % (ref 36.3–47.1)
HDLC SERPL-MCNC: 50 MG/DL
HEMOGLOBIN: 12.1 G/DL (ref 11.9–15.1)
HEMOGLOBIN: 14.4 G/DL (ref 11.9–15.1)
IMMATURE GRANULOCYTES: 0 %
IMMATURE GRANULOCYTES: 1 %
LACTIC ACID, WHOLE BLOOD: 3.6 MMOL/L (ref 0.7–2.1)
LDL CHOLESTEROL: 72 MG/DL (ref 0–130)
LYMPHOCYTES # BLD: 11 % (ref 24–43)
LYMPHOCYTES # BLD: 16 % (ref 24–43)
Lab: NORMAL
MAGNESIUM: 1.2 MG/DL (ref 1.6–2.6)
MCH RBC QN AUTO: 31.4 PG (ref 25.2–33.5)
MCH RBC QN AUTO: 31.5 PG (ref 25.2–33.5)
MCHC RBC AUTO-ENTMCNC: 33.1 G/DL (ref 28.4–34.8)
MCHC RBC AUTO-ENTMCNC: 33.9 G/DL (ref 28.4–34.8)
MCV RBC AUTO: 93 FL (ref 82.6–102.9)
MCV RBC AUTO: 94.8 FL (ref 82.6–102.9)
MDMA URINE: ABNORMAL
METHADONE SCREEN, URINE: NEGATIVE
METHAMPHETAMINE, URINE: ABNORMAL
MODE: ABNORMAL
MONOCYTES # BLD: 6 % (ref 3–12)
MONOCYTES # BLD: 8 % (ref 3–12)
MRSA, DNA, NASAL: ABNORMAL
NEGATIVE BASE EXCESS, ART: ABNORMAL (ref 0–2)
NRBC AUTOMATED: 0 PER 100 WBC
NRBC AUTOMATED: 0 PER 100 WBC
O2 DEVICE/FLOW/%: ABNORMAL
OPIATES, URINE: NEGATIVE
OXYCODONE SCREEN URINE: NEGATIVE
PATIENT TEMP: ABNORMAL
PDW BLD-RTO: 13.7 % (ref 11.8–14.4)
PDW BLD-RTO: 14 % (ref 11.8–14.4)
PHENCYCLIDINE, URINE: NEGATIVE
PHOSPHORUS: 1.4 MG/DL (ref 2.6–4.5)
PLATELET # BLD: 271 K/UL (ref 138–453)
PLATELET # BLD: ABNORMAL K/UL (ref 138–453)
PLATELET ESTIMATE: ABNORMAL
PLATELET ESTIMATE: ABNORMAL
PLATELET, FLUORESCENCE: 208 K/UL (ref 138–453)
PLATELET, IMMATURE FRACTION: 2.4 % (ref 1.1–10.3)
PMV BLD AUTO: 10.3 FL (ref 8.1–13.5)
PMV BLD AUTO: ABNORMAL FL (ref 8.1–13.5)
POC HCO3: 23.7 MMOL/L (ref 21–28)
POC O2 SATURATION: 98 % (ref 94–98)
POC PCO2 TEMP: ABNORMAL MM HG
POC PCO2: 32.3 MM HG (ref 35–48)
POC PH TEMP: ABNORMAL
POC PH: 7.47 (ref 7.35–7.45)
POC PO2 TEMP: ABNORMAL MM HG
POC PO2: 94.6 MM HG (ref 83–108)
POSITIVE BASE EXCESS, ART: 1 (ref 0–3)
POTASSIUM SERPL-SCNC: 3.5 MMOL/L (ref 3.7–5.3)
PRO-BNP: 4695 PG/ML
PROPOXYPHENE, URINE: ABNORMAL
RBC # BLD: 3.84 M/UL (ref 3.95–5.11)
RBC # BLD: 4.59 M/UL (ref 3.95–5.11)
RBC # BLD: ABNORMAL 10*6/UL
RBC # BLD: ABNORMAL 10*6/UL
SAMPLE SITE: ABNORMAL
SEG NEUTROPHILS: 76 % (ref 36–65)
SEG NEUTROPHILS: 82 % (ref 36–65)
SEGMENTED NEUTROPHILS ABSOLUTE COUNT: 11.36 K/UL (ref 1.5–8.1)
SEGMENTED NEUTROPHILS ABSOLUTE COUNT: 12.01 K/UL (ref 1.5–8.1)
SERUM OSMOLALITY: 291 MOSM/KG (ref 275–295)
SERUM OSMOLALITY: 293 MOSM/KG (ref 275–295)
SERUM OSMOLALITY: 299 MOSM/KG (ref 275–295)
SERUM OSMOLALITY: 303 MOSM/KG (ref 275–295)
SODIUM BLD-SCNC: 138 MMOL/L (ref 135–144)
SODIUM BLD-SCNC: 144 MMOL/L (ref 135–144)
SPECIMEN DESCRIPTION: ABNORMAL
SPECIMEN DESCRIPTION: NORMAL
STATUS: NORMAL
TCO2 (CALC), ART: 25 MMOL/L (ref 22–29)
TEST INFORMATION: ABNORMAL
TRICYCLIC ANTIDEPRESSANTS, UR: ABNORMAL
TRIGL SERPL-MCNC: 65 MG/DL
VLDLC SERPL CALC-MCNC: NORMAL MG/DL (ref 1–30)
WBC # BLD: 13.8 K/UL (ref 3.5–11.3)
WBC # BLD: 15.8 K/UL (ref 3.5–11.3)
WBC # BLD: ABNORMAL 10*3/UL
WBC # BLD: ABNORMAL 10*3/UL

## 2018-09-03 PROCEDURE — 36600 WITHDRAWAL OF ARTERIAL BLOOD: CPT

## 2018-09-03 PROCEDURE — 83605 ASSAY OF LACTIC ACID: CPT

## 2018-09-03 PROCEDURE — 82803 BLOOD GASES ANY COMBINATION: CPT

## 2018-09-03 PROCEDURE — 85055 RETICULATED PLATELET ASSAY: CPT

## 2018-09-03 PROCEDURE — 6360000002 HC RX W HCPCS

## 2018-09-03 PROCEDURE — 80307 DRUG TEST PRSMV CHEM ANLYZR: CPT

## 2018-09-03 PROCEDURE — S0028 INJECTION, FAMOTIDINE, 20 MG: HCPCS | Performed by: EMERGENCY MEDICINE

## 2018-09-03 PROCEDURE — 2500000003 HC RX 250 WO HCPCS: Performed by: EMERGENCY MEDICINE

## 2018-09-03 PROCEDURE — 2000000003 HC NEURO ICU R&B

## 2018-09-03 PROCEDURE — 82330 ASSAY OF CALCIUM: CPT

## 2018-09-03 PROCEDURE — 36415 COLL VENOUS BLD VENIPUNCTURE: CPT

## 2018-09-03 PROCEDURE — 94762 N-INVAS EAR/PLS OXIMTRY CONT: CPT

## 2018-09-03 PROCEDURE — 94770 HC ETCO2 MONITOR DAILY: CPT

## 2018-09-03 PROCEDURE — 2580000003 HC RX 258: Performed by: EMERGENCY MEDICINE

## 2018-09-03 PROCEDURE — 80048 BASIC METABOLIC PNL TOTAL CA: CPT

## 2018-09-03 PROCEDURE — 6370000000 HC RX 637 (ALT 250 FOR IP): Performed by: STUDENT IN AN ORGANIZED HEALTH CARE EDUCATION/TRAINING PROGRAM

## 2018-09-03 PROCEDURE — 85025 COMPLETE CBC W/AUTO DIFF WBC: CPT

## 2018-09-03 PROCEDURE — 83735 ASSAY OF MAGNESIUM: CPT

## 2018-09-03 PROCEDURE — 99291 CRITICAL CARE FIRST HOUR: CPT | Performed by: PSYCHIATRY & NEUROLOGY

## 2018-09-03 PROCEDURE — 82947 ASSAY GLUCOSE BLOOD QUANT: CPT

## 2018-09-03 PROCEDURE — 70551 MRI BRAIN STEM W/O DYE: CPT

## 2018-09-03 PROCEDURE — 94003 VENT MGMT INPAT SUBQ DAY: CPT

## 2018-09-03 PROCEDURE — 6370000000 HC RX 637 (ALT 250 FOR IP): Performed by: EMERGENCY MEDICINE

## 2018-09-03 PROCEDURE — 74018 RADEX ABDOMEN 1 VIEW: CPT

## 2018-09-03 PROCEDURE — 99292 CRITICAL CARE ADDL 30 MIN: CPT | Performed by: PSYCHIATRY & NEUROLOGY

## 2018-09-03 PROCEDURE — 84100 ASSAY OF PHOSPHORUS: CPT

## 2018-09-03 PROCEDURE — 2580000003 HC RX 258: Performed by: STUDENT IN AN ORGANIZED HEALTH CARE EDUCATION/TRAINING PROGRAM

## 2018-09-03 PROCEDURE — 84295 ASSAY OF SERUM SODIUM: CPT

## 2018-09-03 PROCEDURE — 87641 MR-STAPH DNA AMP PROBE: CPT

## 2018-09-03 PROCEDURE — 83930 ASSAY OF BLOOD OSMOLALITY: CPT

## 2018-09-03 PROCEDURE — 2500000003 HC RX 250 WO HCPCS: Performed by: STUDENT IN AN ORGANIZED HEALTH CARE EDUCATION/TRAINING PROGRAM

## 2018-09-03 PROCEDURE — 95951 HC EEG MONITORING VIDEO RECORDING: CPT

## 2018-09-03 PROCEDURE — 6360000002 HC RX W HCPCS: Performed by: EMERGENCY MEDICINE

## 2018-09-03 PROCEDURE — 80061 LIPID PANEL: CPT

## 2018-09-03 PROCEDURE — 6360000002 HC RX W HCPCS: Performed by: STUDENT IN AN ORGANIZED HEALTH CARE EDUCATION/TRAINING PROGRAM

## 2018-09-03 RX ORDER — DEXTROSE MONOHYDRATE 25 G/50ML
12.5 INJECTION, SOLUTION INTRAVENOUS PRN
Status: CANCELLED | OUTPATIENT
Start: 2018-09-03

## 2018-09-03 RX ORDER — ASPIRIN 81 MG/1
81 TABLET, CHEWABLE ORAL DAILY
Status: DISCONTINUED | OUTPATIENT
Start: 2018-09-03 | End: 2018-09-07

## 2018-09-03 RX ORDER — ONDANSETRON 2 MG/ML
4 INJECTION INTRAMUSCULAR; INTRAVENOUS EVERY 6 HOURS PRN
Status: DISCONTINUED | OUTPATIENT
Start: 2018-09-03 | End: 2018-09-09 | Stop reason: HOSPADM

## 2018-09-03 RX ORDER — 3% SODIUM CHLORIDE 3 G/100ML
50 INJECTION, SOLUTION INTRAVENOUS CONTINUOUS
Status: DISCONTINUED | OUTPATIENT
Start: 2018-09-03 | End: 2018-09-03 | Stop reason: SDUPTHER

## 2018-09-03 RX ORDER — DEXTROSE MONOHYDRATE 50 MG/ML
100 INJECTION, SOLUTION INTRAVENOUS PRN
Status: CANCELLED | OUTPATIENT
Start: 2018-09-03

## 2018-09-03 RX ORDER — SODIUM CHLORIDE 9 MG/ML
INJECTION, SOLUTION INTRAVENOUS CONTINUOUS
Status: DISCONTINUED | OUTPATIENT
Start: 2018-09-03 | End: 2018-09-08

## 2018-09-03 RX ORDER — LEVETIRACETAM 5 MG/ML
500 INJECTION INTRAVASCULAR EVERY 12 HOURS
Status: DISCONTINUED | OUTPATIENT
Start: 2018-09-03 | End: 2018-09-04

## 2018-09-03 RX ORDER — MAGNESIUM SULFATE 1 G/100ML
1 INJECTION INTRAVENOUS
Status: COMPLETED | OUTPATIENT
Start: 2018-09-03 | End: 2018-09-03

## 2018-09-03 RX ORDER — LABETALOL HYDROCHLORIDE 5 MG/ML
10 INJECTION, SOLUTION INTRAVENOUS EVERY 4 HOURS PRN
Status: DISCONTINUED | OUTPATIENT
Start: 2018-09-03 | End: 2018-09-08

## 2018-09-03 RX ORDER — NICOTINE POLACRILEX 4 MG
15 LOZENGE BUCCAL PRN
Status: CANCELLED | OUTPATIENT
Start: 2018-09-03

## 2018-09-03 RX ORDER — SODIUM CHLORIDE 0.9 % (FLUSH) 0.9 %
10 SYRINGE (ML) INJECTION PRN
Status: DISCONTINUED | OUTPATIENT
Start: 2018-09-03 | End: 2018-09-09 | Stop reason: HOSPADM

## 2018-09-03 RX ORDER — PROPOFOL 10 MG/ML
10 INJECTION, EMULSION INTRAVENOUS
Status: DISCONTINUED | OUTPATIENT
Start: 2018-09-03 | End: 2018-09-05

## 2018-09-03 RX ORDER — 3% SODIUM CHLORIDE 3 G/100ML
50 INJECTION, SOLUTION INTRAVENOUS CONTINUOUS
Status: DISPENSED | OUTPATIENT
Start: 2018-09-03 | End: 2018-09-04

## 2018-09-03 RX ORDER — MANNITOL 250 MG/ML
25 INJECTION, SOLUTION INTRAVENOUS EVERY 4 HOURS
Status: DISCONTINUED | OUTPATIENT
Start: 2018-09-03 | End: 2018-09-03

## 2018-09-03 RX ORDER — POTASSIUM CHLORIDE 7.45 MG/ML
10 INJECTION INTRAVENOUS
Status: COMPLETED | OUTPATIENT
Start: 2018-09-03 | End: 2018-09-03

## 2018-09-03 RX ORDER — MAGNESIUM SULFATE 1 G/100ML
INJECTION INTRAVENOUS
Status: COMPLETED
Start: 2018-09-03 | End: 2018-09-03

## 2018-09-03 RX ORDER — ATORVASTATIN CALCIUM 40 MG/1
40 TABLET, FILM COATED ORAL NIGHTLY
Status: DISCONTINUED | OUTPATIENT
Start: 2018-09-03 | End: 2018-09-07

## 2018-09-03 RX ORDER — LEVETIRACETAM 10 MG/ML
1000 INJECTION INTRAVASCULAR ONCE
Status: COMPLETED | OUTPATIENT
Start: 2018-09-03 | End: 2018-09-03

## 2018-09-03 RX ORDER — SODIUM CHLORIDE 0.9 % (FLUSH) 0.9 %
10 SYRINGE (ML) INJECTION EVERY 12 HOURS SCHEDULED
Status: DISCONTINUED | OUTPATIENT
Start: 2018-09-03 | End: 2018-09-07

## 2018-09-03 RX ADMIN — POTASSIUM CHLORIDE 10 MEQ: 10 INJECTION, SOLUTION INTRAVENOUS at 02:29

## 2018-09-03 RX ADMIN — POTASSIUM CHLORIDE 10 MEQ: 10 INJECTION, SOLUTION INTRAVENOUS at 03:24

## 2018-09-03 RX ADMIN — POTASSIUM CHLORIDE 10 MEQ: 10 INJECTION, SOLUTION INTRAVENOUS at 05:52

## 2018-09-03 RX ADMIN — INSULIN LISPRO 1 UNITS: 100 INJECTION, SOLUTION INTRAVENOUS; SUBCUTANEOUS at 19:44

## 2018-09-03 RX ADMIN — CEFTRIAXONE SODIUM 1 G: 1 INJECTION, POWDER, FOR SOLUTION INTRAMUSCULAR; INTRAVENOUS at 12:52

## 2018-09-03 RX ADMIN — PROPOFOL 20 MCG/KG/MIN: 10 INJECTION, EMULSION INTRAVENOUS at 05:53

## 2018-09-03 RX ADMIN — DESMOPRESSIN ACETATE 40 MG: 0.2 TABLET ORAL at 19:43

## 2018-09-03 RX ADMIN — PROPOFOL 20 MCG/KG/MIN: 10 INJECTION, EMULSION INTRAVENOUS at 00:35

## 2018-09-03 RX ADMIN — LEVETIRACETAM 500 MG: 5 INJECTION INTRAVENOUS at 12:52

## 2018-09-03 RX ADMIN — SODIUM CHLORIDE 50 ML/HR: 3 INJECTION, SOLUTION INTRAVENOUS at 00:53

## 2018-09-03 RX ADMIN — ENOXAPARIN SODIUM 40 MG: 40 INJECTION SUBCUTANEOUS at 08:28

## 2018-09-03 RX ADMIN — VANCOMYCIN HYDROCHLORIDE 750 MG: 10 INJECTION, POWDER, LYOPHILIZED, FOR SOLUTION INTRAVENOUS at 12:59

## 2018-09-03 RX ADMIN — FAMOTIDINE 20 MG: 10 INJECTION, SOLUTION INTRAVENOUS at 19:43

## 2018-09-03 RX ADMIN — ASPIRIN 81 MG: 81 TABLET, CHEWABLE ORAL at 08:28

## 2018-09-03 RX ADMIN — LABETALOL HYDROCHLORIDE 10 MG: 5 INJECTION, SOLUTION INTRAVENOUS at 00:00

## 2018-09-03 RX ADMIN — SODIUM CHLORIDE 50 ML/HR: 3 INJECTION, SOLUTION INTRAVENOUS at 19:44

## 2018-09-03 RX ADMIN — FAMOTIDINE 20 MG: 10 INJECTION, SOLUTION INTRAVENOUS at 08:28

## 2018-09-03 RX ADMIN — FAMOTIDINE 20 MG: 10 INJECTION, SOLUTION INTRAVENOUS at 00:37

## 2018-09-03 RX ADMIN — MAGNESIUM SULFATE IN DEXTROSE 1 G: 10 INJECTION, SOLUTION INTRAVENOUS at 19:08

## 2018-09-03 RX ADMIN — POTASSIUM CHLORIDE 10 MEQ: 10 INJECTION, SOLUTION INTRAVENOUS at 01:02

## 2018-09-03 RX ADMIN — MANNITOL 25 G: 12.5 INJECTION, SOLUTION INTRAVENOUS at 08:29

## 2018-09-03 RX ADMIN — SODIUM CHLORIDE 100 ML/HR: 3 INJECTION, SOLUTION INTRAVENOUS at 12:00

## 2018-09-03 RX ADMIN — METOPROLOL TARTRATE 25 MG: 25 TABLET ORAL at 19:43

## 2018-09-03 RX ADMIN — LEVETIRACETAM 1000 MG: 10 INJECTION INTRAVENOUS at 00:53

## 2018-09-03 RX ADMIN — MAGNESIUM SULFATE IN DEXTROSE 1 G: 10 INJECTION, SOLUTION INTRAVENOUS at 17:50

## 2018-09-03 RX ADMIN — CALCIUM CHLORIDE: 100 INJECTION INTRAVENOUS; INTRAVENTRICULAR at 18:11

## 2018-09-03 RX ADMIN — INSULIN LISPRO 1 UNITS: 100 INJECTION, SOLUTION INTRAVENOUS; SUBCUTANEOUS at 01:04

## 2018-09-03 RX ADMIN — SODIUM CHLORIDE: 9 INJECTION, SOLUTION INTRAVENOUS at 17:54

## 2018-09-03 RX ADMIN — MANNITOL 25 G: 12.5 INJECTION, SOLUTION INTRAVENOUS at 01:25

## 2018-09-03 RX ADMIN — SODIUM CHLORIDE 50 ML/HR: 3 INJECTION, SOLUTION INTRAVENOUS at 11:08

## 2018-09-03 ASSESSMENT — PULMONARY FUNCTION TESTS
PIF_VALUE: 13
PIF_VALUE: 10
PIF_VALUE: 13
PIF_VALUE: 13
PIF_VALUE: 16
PIF_VALUE: 15
PIF_VALUE: 13
PIF_VALUE: 13

## 2018-09-03 NOTE — FLOWSHEET NOTE
[facility]     Emergency/Trauma Note    PATIENT NAME: Arun Oseguera    Shift date: 9/2/18  Shift day: Sunday   Shift # 2    Room # 12/12   Name: Arun Oseguera            Age: 56 y.o. Gender: female          Muslim: [address] of Faith: Unknown    Trauma/Incident type: Stroke Alert  Admit Date & Time: 9/2/2018  7:50 PM      PATIENT/EVENT DESCRIPTION:  Arun Oseguera is a 56 y.o. female who, per nurse, was dropped off as a drug overdose and was paged out as a stroke alert. Pt to be admitted to 12/12. SPIRITUAL ASSESSMENT/INTERVENTION:  Patient was intubated and taken to CT.  called both numbers on facesheet and neither were good.  spoke with registration who had no other numbers. Nurse stated that patient had family in waiting room but waiting room was empty. PATIENT BELONGINGS:  No belongings noted    ANY BELONGINGS OF SIGNIFICANT VALUE NOTED:  None    REGISTRATION STAFF NOTIFIED? Yes      WHAT IS YOUR SPIRITUAL CARE PLAN FOR THIS PATIENT?:   will refer patient to oncoming .     Electronically signed by Zena Ramsay on 9/2/2018 at 9:04 PM.  [facility]  [phone]       09/02/18 2057   Encounter Summary   Services provided to: Patient   Length of Encounter 15 minutes   Crisis   Type Stroke Alert   Assessment Unable to respond   Outcome Did not respond

## 2018-09-03 NOTE — PROGRESS NOTES
[facility]     Neurosurgery Service      Progress Note             No acute surgical intervention at this time     Neurosurgery to sign off       Please contact neurosurgery with any questions or concerns. Shad Camacho .  CNP  Neurosurgery    Cell : Cell [phone]  pager [phone]

## 2018-09-03 NOTE — PROGRESS NOTES
Nutrition Assessment    Type and Reason for Visit: Initial    Nutrition Recommendations: If fluid restricted tube feed is necessary, recommend Nepro (Renal)  at 25 ml per hour( 1080 kcal, 49 g protein). If able, recommend start Glucerna (diabetic) tube feed goal of 35 ml per hour to provide 1008 kcal, 50 g protein. This, in addition to 132 kcal from diprivan will approximate pt's nutrition needs. Malnutrition Assessment:  · Malnutrition Status: At risk for malnutrition  · Context: Chronic illness  · Findings of the 6 clinical characteristics of malnutrition (Minimum of 2 out of 6 clinical characteristics is required to make the diagnosis of moderate or severe Protein Calorie Malnutrition based on AND/ASPEN Guidelines):  1. Energy Intake-Not available, not able to assess    2. Weight Loss-No significant weight loss, unable to assess  3. Fat Loss- ,    4. Muscle Loss-Severe muscle mass loss, Temples (temporalis muscle)  5. Fluid Accumulation-Mild fluid accumulation, Extremities  6.   Strength-Not measured    Nutrition Diagnosis:   · Problem: Inadequate oral intake  · Etiology: related to Impaired respiratory function-inability to consume food     Signs and symptoms:  as evidenced by Intubation, NPO status due to medical condition    Nutrition Assessment:  · Subjective Assessment: Pt is vented, diprivan at 5 ml per hour  · Current Nutrition Therapies:  · Oral Diet Orders: NPO   · Anthropometric Measures:  · Ht: 5' (152.4 cm)   · Current Body Wt: 96 lb (43.5 kg)  · Ideal Body Wt: 100 lb (45.4 kg), % Ideal Body 96%  · BMI Classification: BMI 18.5 - 24.9 Normal Weight  · Comparative Standards (Estimated Nutrition Needs):  · Estimated Daily Total Kcal: 25-28 kcal/ab=6797-6978 kcal  · Estimated Daily Protein (g): 60-65 g or more    Estimated Intake vs Estimated Needs: Intake Less Than Needs    Nutrition Risk Level: High    Nutrition Interventions:   Start Tube Feeding  Education not appropriate at this time    Nutrition Evaluation:   · Evaluation: Goals set   · Goals: initiation of nutrition support within 48 hours    · Monitoring: NPO Status    See Adult Nutrition Doc Flowsheet for more detail.      Electronically signed by Sheng Duke RD, LD on 9/3/18 at 10:28 AM    Contact Number: [phone]

## 2018-09-03 NOTE — PLAN OF CARE
Problem: Risk for Impaired Skin Integrity  Goal: Tissue integrity - skin and mucous membranes  Structural intactness and normal physiological function of skin and  mucous membranes.    Outcome: Ongoing  Pt mucous membrane remain pink and moist

## 2018-09-03 NOTE — ED NOTES
Report to Qi Betancourt. Plan is transfer to Neuro ICU after stat orders completed.      Nolan Maguire, GRETEL  09/02/18 0716

## 2018-09-03 NOTE — H&P
4 mg, 4 mg, Intravenous, Q6H PRN  enoxaparin (LOVENOX) injection 40 mg, 40 mg, Subcutaneous, Daily  famotidine (PEPCID) injection 20 mg, 20 mg, Intravenous, BID  atorvastatin (LIPITOR) tablet 40 mg, 40 mg, Per NG tube, Nightly  aspirin chewable tablet 81 mg, 81 mg, Per NG tube, Daily  niCARdipine (CARDENE) 20 mg in 0.9 % sodium chloride 200 mL infusion, 5 mg/hr, Intravenous, Continuous  sodium chloride 3 % solution, 50 mL/hr, Intravenous, Continuous  mannitol 25 % injection 25 g, 25 g, Intravenous, Q4H  levetiracetam (KEPPRA) 500 mg/100 mL IVPB, 500 mg, Intravenous, Q12H  insulin lispro (HUMALOG) injection vial 0-6 Units, 0-6 Units, Subcutaneous, TID WC  insulin lispro (HUMALOG) injection vial 0-3 Units, 0-3 Units, Subcutaneous, Nightly  propofol injection, 10 mcg/kg/min, Intravenous, Titrated  niCARdipine (CARDENE) 20 mg in 0.9 % sodium chloride 200 mL infusion, 5 mg/hr, Intravenous, Continuous  ketamine (KETALAR) injection 10.2 mg, 0.25 mg/kg, Intravenous, Once  azithromycin (ZITHROMAX) 500 mg in dextrose 5 % 250 mL IVPB, 500 mg, Intravenous, Once  norepinephrine (LEVOPHED) 16 mg in dextrose 5 % 250 mL infusion, 2 mcg/min, Intravenous, Continuous    REVIEW OF SYSTEMS   Unable to obtain secondary to altered mental status and intubated    PHYSICAL EXAM:     BP 92/77   Pulse 136   Temp 97.5 °F (36.4 °C) (Axillary)   Resp (!) 34   Ht 5' (1.524 m)   Wt 43 lb 12.8 oz (19.9 kg)   LMP  (LMP Unknown)   SpO2 100%   BMI 8.55 kg/m²     Estimated body mass index is 8.55 kg/m² as calculated from the following:    Height as of this encounter: 5' (1.524 m).     Weight as of this encounter: 43 lb 12.8 oz (19.9 kg).  []<16 Severe malnutrition  []16-16.99 Moderate malnutrition  []17-18.49 Mild malnutrition  []18.5-24.9 Normal  []25-29.9 Overweight (not obese)  []30-34.9 Obese class 1 (Low Risk)  []35-39.9 Obese class 2 (Moderate Risk)  []?40 Obese class 3 (High Risk)    PHYSICAL EXAM:  CONSTITUTIONAL:  Well developed, well nourished, intubated, sedated, unresponsive.    HEAD:  normocephalic, atraumatic    EYES:  PERRLA, EOMI. 2 mm   ENT:  moist mucous membranes   LUNGS:  Equal air entry bilaterally, intubate, mild rhonchi bilaterally   CARDIOVASCULAR:  normal s1 / s2   ABDOMEN:  Soft, no rigidity   NECK supple, symmetric, no midline tenderness to palpation    BACK without midline tenderness, step-offs or deformities    EXTREMITIES No spontaneous movement of the RUE and RLE   NEUROLOGIC:  Mental Status:  Intubated and sedated, unresponsive,comatose             Cranial Nerves:    cranial nerves II-XII are grossly intact    Motor Exam:    Drift:  Unable to assess  Tone:  abnormal - unable to assess    Unable to assess    Sensory:    Touch:    Unable to assess  Plantar Response:  Right:  no response  Left:  no response    Clonus:  absent  Chávez's:  absent    Coordination/Dysmetria:  Unable to assess  Gait:  Unable to assess   Unable to assess No obvious ecchymosis, rashes, or lesions      LABS AND IMAGING:     RECENT LABS:  CBC with Differential:    Lab Results   Component Value Date    WBC 20.0 09/02/2018    WBC 20.2 09/02/2018    RBC 4.70 09/02/2018    RBC 4.75 09/02/2018    RBC 3.25 10/15/2011    HGB 14.9 09/02/2018    HGB 15.0 09/02/2018    HCT 44.6 09/02/2018    HCT 44.6 09/02/2018     09/02/2018     09/02/2018     10/15/2011    MCV 94.9 09/02/2018    MCV 93.9 09/02/2018    MCH 31.7 09/02/2018    MCH 31.6 09/02/2018    MCHC 33.4 09/02/2018    MCHC 33.6 09/02/2018    RDW 13.3 09/02/2018    RDW 13.4 09/02/2018    LYMPHOPCT 6 09/02/2018    LYMPHOPCT 6 09/02/2018    MONOPCT 5 09/02/2018    MONOPCT 4 09/02/2018    BASOPCT 0 09/02/2018    BASOPCT 0 09/02/2018    MONOSABS 0.92 09/02/2018    MONOSABS 0.87 09/02/2018    LYMPHSABS 1.24 09/02/2018    LYMPHSABS 1.24 09/02/2018    EOSABS <0.03 09/02/2018    EOSABS <0.03 09/02/2018    BASOSABS 0.05 09/02/2018    BASOSABS 0.03 09/02/2018    DIFFTYPE NOT REPORTED 09/02/2018 sinuses and mastoid air cells demonstrate no acute abnormality. SOFT TISSUES/SKULL:  No acute abnormality of the visualized skull or soft tissues. Acute large right MCA territory infarction, involving the right frontal, temporal, parietal lobes and the right basal ganglia/insular cortex. Associated mass effect and 4 mm right to left midline shift. No acute intracranial hemorrhage. 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 The results were reported to Dr. Tosha Beasley at 9:09 p.m. on September 2, 2018. Xr Chest Portable    Result Date: 9/3/2018  EXAMINATION: SINGLE XRAY VIEW OF THE CHEST, 9/2/2018 8:41 pm COMPARISON: Chest x-ray dated 04/16/2016 HISTORY: ORDERING SYSTEM PROVIDED HISTORY: SOB TECHNOLOGIST PROVIDED HISTORY: SOB FINDINGS: Endotracheal tube is in place with distal tip approximately 7.5 cm above the lucio. Enteric tube extends below the left hemidiaphragm with distal tip overlying the proximal stomach. The cardiomediastinal silhouette and pulmonary vasculature are within normal limits. Lungs are hyperinflated. No focal airspace consolidation, pneumothorax, or pleural effusion. Nipple shadow is noted overlying the mid right lung. No evidence of acute osseous abnormality. No free air beneath the diaphragm. 1. Endotracheal tube with distal tip approximately 7.5 cm above the lucio. Enteric tube in appropriate position. 2. No evidence of acute intrathoracic process. 3. COPD. Cta Neck W Contrast    Result Date: 9/2/2018  EXAMINATION: CTA OF THE HEAD WITH CONTRAST; CTA OF THE NECK 9/2/2018 9:08 pm: TECHNIQUE: CTA of the head/brain was performed with the administration of intravenous contrast. Multiplanar reformatted images are provided for review. MIP images are provided for review.  Dose modulation, iterative reconstruction, and/or weight based adjustment of the mA/kV was utilized to reduce the radiation dose to as low as reasonably achievable.; CTA of the neck was performed with the administration of intravenous contrast. Multiplanar reformatted images are provided for review. MIP images are provided for review. Stenosis of the internal carotid arteries measured using NASCET criteria. Dose modulation, iterative reconstruction, and/or weight based adjustment of the mA/kV was utilized to reduce the radiation dose to as low as reasonably achievable. COMPARISON: CT head September 2018, CTA head and neck July 16, 2015 HISTORY: ORDERING SYSTEM PROVIDED HISTORY: STROKE TECHNOLOGIST PROVIDED HISTORY: FINDINGS: CTA NECK: AORTIC ARCH/ARCH VESSELS: There is a normal branch pattern of the aortic arch. There is thrombus at the origin of the right subclavian artery, contributing to near occlusion. The left subclavian artery is patent without flow limiting stenosis. CAROTID ARTERIES: The common carotid arteries are normal in appearance without evidence of a flow limiting stenosis. The internal carotid arteries are normal in appearance without evidence of a flow limiting stenosis by NASCET criteria. No dissection or arterial injury is seen. VERTEBRAL ARTERIES: The vertebral arteries both arise from the subclavian arteries. There is left dominance of the vertebral arteries. The left vertebral artery is within normal limits, without flow limiting stenosis. There is hypoplastic right vertebral artery, with minimal contrast opacification in the V2 segment and nonvisualized V1 segment, stable since July 16, 2015. SOFT TISSUES: The lung apices are clear. No cervical or superior mediastinal lymphadenopathy. The visualized portion of the larynx and pharynx appear unremarkable. The parotid, submandibular and thyroid glands demonstrate no acute abnormality. BONES: The visualized osseous structures appear unremarkable. CTA HEAD: ANTERIOR CIRCULATION: There is up to 50% focal stenosis in the left cavernous internal carotid artery.   The right cavernous internal carotid artery and the bilateral supraclinoid internal carotid arteries are within normal limits. There is occlusion of right MCA at the proximal aspect of the M1 segment. The anterior cerebral and left middle cerebral ar artery demonstrate no focal stenosis. POSTERIOR CIRCULATION: The posterior cerebral arteries demonstrate no focal stenosis. There is left dominance of the vertebral arteries. The vertebral and basilar arteries appear unremarkable. BRAIN: There is known acute large right MCA territory infarction, involving the right frontal, parietal and temporal lobes, the right basal ganglia and insular cortex. There is associated mass effect and 5 mm right left midline shift. There is large old left MCA territory infarction. There are smaller infarctions involving the left bilateral cerebellar hemispheres, left more than right. Occlusion at the proximal aspect of the M1 segment of the right MCA, likely contributing to known acute large right MCA territory infarction. Thrombus at the proximal aspect of the right subclavian artery, contributing to near occlusion. Hypoplastic right vertebral artery, with minimal contrast opacification in the V2 segment and nonvisualized V1 segment, stable since July 16, 2015. 50% focal stenosis in the left cavernous internal carotid artery secondary to atherosclerotic calcification. The bilateral common and cervical internal carotid arteries are patent without focal stenosis. The results were reported to Dr. Cortez Green at 9:54 p.m. on September 2, 2018. Cta Head W Contrast    Result Date: 9/2/2018  EXAMINATION: CTA OF THE HEAD WITH CONTRAST; CTA OF THE NECK 9/2/2018 9:08 pm: TECHNIQUE: CTA of the head/brain was performed with the administration of intravenous contrast. Multiplanar reformatted images are provided for review. MIP images are provided for review.  Dose modulation, iterative reconstruction, and/or weight based adjustment of the mA/kV was utilized to reduce the radiation dose to as low as reasonably achievable.; CTA of the neck was performed with the administration of intravenous contrast. Multiplanar reformatted images are provided for review. MIP images are provided for review. Stenosis of the internal carotid arteries measured using NASCET criteria. Dose modulation, iterative reconstruction, and/or weight based adjustment of the mA/kV was utilized to reduce the radiation dose to as low as reasonably achievable. COMPARISON: CT head September 2018, CTA head and neck July 16, 2015 HISTORY: ORDERING SYSTEM PROVIDED HISTORY: STROKE TECHNOLOGIST PROVIDED HISTORY: FINDINGS: CTA NECK: AORTIC ARCH/ARCH VESSELS: There is a normal branch pattern of the aortic arch. There is thrombus at the origin of the right subclavian artery, contributing to near occlusion. The left subclavian artery is patent without flow limiting stenosis. CAROTID ARTERIES: The common carotid arteries are normal in appearance without evidence of a flow limiting stenosis. The internal carotid arteries are normal in appearance without evidence of a flow limiting stenosis by NASCET criteria. No dissection or arterial injury is seen. VERTEBRAL ARTERIES: The vertebral arteries both arise from the subclavian arteries. There is left dominance of the vertebral arteries. The left vertebral artery is within normal limits, without flow limiting stenosis. There is hypoplastic right vertebral artery, with minimal contrast opacification in the V2 segment and nonvisualized V1 segment, stable since July 16, 2015. SOFT TISSUES: The lung apices are clear. No cervical or superior mediastinal lymphadenopathy. The visualized portion of the larynx and pharynx appear unremarkable. The parotid, submandibular and thyroid glands demonstrate no acute abnormality. BONES: The visualized osseous structures appear unremarkable.  CTA HEAD: ANTERIOR CIRCULATION: There is up to 50% focal stenosis in the left cavernous internal carotid artery. The right cavernous internal carotid artery and the bilateral supraclinoid internal carotid arteries are within normal limits. There is occlusion of right MCA at the proximal aspect of the M1 segment. The anterior cerebral and left middle cerebral ar artery demonstrate no focal stenosis. POSTERIOR CIRCULATION: The posterior cerebral arteries demonstrate no focal stenosis. There is left dominance of the vertebral arteries. The vertebral and basilar arteries appear unremarkable. BRAIN: There is known acute large right MCA territory infarction, involving the right frontal, parietal and temporal lobes, the right basal ganglia and insular cortex. There is associated mass effect and 5 mm right left midline shift. There is large old left MCA territory infarction. There are smaller infarctions involving the left bilateral cerebellar hemispheres, left more than right. Occlusion at the proximal aspect of the M1 segment of the right MCA, likely contributing to known acute large right MCA territory infarction. Thrombus at the proximal aspect of the right subclavian artery, contributing to near occlusion. Hypoplastic right vertebral artery, with minimal contrast opacification in the V2 segment and nonvisualized V1 segment, stable since July 16, 2015. 50% focal stenosis in the left cavernous internal carotid artery secondary to atherosclerotic calcification. The bilateral common and cervical internal carotid arteries are patent without focal stenosis. The results were reported to Dr. Tyrell Schrader at 9:54 p.m. on September 2, 2018. Labs and Images reviewed with:    [] Sam Quiros MD    [] Lyly Pierson MD    [] Scarlet Claire MD  [x] Eron Young MD  --[] there are no new interval images to review.      ASSESSMENT AND PLAN:       Patient Active Problem List   Diagnosis    Cerebral infarction ([address]    Somnolence    CHF (congestive heart failure) (HCC)    Heroin overdose    Lactic acidosis   

## 2018-09-03 NOTE — ED PROVIDER NOTES
[facility]  Emergency Department Encounter  Emergency Medicine Resident     Pt Name: Julissa Marquez  MRN: [id number]  [place] 1960  Date of evaluation: 9/2/18  PCP:  Enoc Jama MD    [address] [place] [address]       Chief Complaint   Patient presents with    Drug Overdose     brought to front door of triage, sts patient \"took a lot of drugs last night\"       HISTORY OF PRESENT ILLNESS  (Location/Symptom, Timing/Onset, Context/Setting, Quality, Duration, Modifying Factors, Severity.)      Julissa Marquez is a 56 y.o. female who presented unconscious and slumped down in car. Patient reportedly had been doing heroin last night. Patient not breathing and was unresponsive on arrival.  Unable to get further history as patient altered and no witnesses with patient. PAST MEDICAL / SURGICAL / SOCIAL / FAMILY HISTORY      has a past medical history of CAD (coronary artery disease); CHF (congestive heart failure) ([facility] [address] Hypertension; Unspecified cerebral artery occlusion with cerebral infarction; and Urinary incontinence, urge. has no past surgical history on file. Social History     Social History    Marital status:      Spouse name: N/A    Number of children: N/A    Years of education: N/A     Occupational History    Not on file. Social History Main Topics    Smoking status: Current Every Day Smoker     Packs/day: 2.00     Types: Cigarettes    Smokeless tobacco: Never Used    Alcohol use No    Drug use: Yes     Types: Marijuana, IV      Comment: daily heroin    Sexual activity: Not on file     Other Topics Concern    Not on file     Social History Narrative    No narrative on file       No family history on file. Allergies:  Patient has no known allergies. Home Medications:  Prior to Admission medications    Medication Sig Start Date End Date Taking?  Authorizing Provider   metoprolol succinate (TOPROL XL) 50 MG extended release tablet take 1 tablet by mouth once daily 6/22/18 Leland Thomson MD   metoprolol succinate (TOPROL XL) 50 MG extended release tablet Take 1 tablet by mouth daily 2/13/18   Leland Thomson MD   aspirin 81 MG chewable tablet Take 1 tablet by mouth daily 11/8/17   Leland Thomson MD   acetaminophen-codeine (TYLENOL #3) 300-30 MG per tablet Take 1 tablet by mouth every 6 hours as needed for Pain 10/25/17   Leland Thomson MD   gabapentin (NEURONTIN) 300 MG capsule Take 1 capsule by mouth 3 times daily 10/16/17   Leland Thomson MD   Incontinence Supply Disposable (INCONTINENCE BRIEF MEDIUM) MISC Use daily 10/16/17   Leland Thomson MD   metoprolol succinate (TOPROL XL) 25 MG extended release tablet Take 0.5 tablets by mouth daily 9/15/16   Leland Thomson MD   docusate (COLACE, DULCOLAX) 100 MG CAPS Take 100 mg by mouth 2 times daily as needed (constipation) 9/15/16   Leland Thomson MD   levETIRAcetam (KEPPRA) 500 MG tablet Take 1 tablet by mouth 2 times daily 9/15/16   Leland Thomson MD   famotidine (PEPCID) 20 MG tablet Take 1 tablet by mouth daily 9/15/16   Leland Thomson MD   atorvastatin (LIPITOR) 80 MG tablet Take 0.5 tablets by mouth nightly 9/15/16   Leland Thomson MD   metFORMIN (GLUCOPHAGE) 500 MG tablet Take 1 tablet by mouth daily (with breakfast) 9/15/16   Leland Thomson MD   oxybutynin (DITROPAN-XL) 5 MG CR tablet Take 1 tablet by mouth nightly 7/31/15   Sunil Santizo MD       REVIEW OF SYSTEMS    (2-9 systems for level 4, 10 or more for level 5)      Review of Systems   Unable to perform ROS: Mental status change       PHYSICAL EXAM   (up to 7 for level 4, 8 or more for level 5)      INITIAL VITALS:   BP 92/77   Pulse 136   Temp 97.5 °F (36.4 °C) (Axillary)   Resp (!) 34   Ht 5' (1.524 m)   Wt 96 lb 9 oz (43.8 kg)   LMP  (LMP Unknown)   SpO2 100%   BMI 18.86 kg/m²     Physical Exam   Constitutional:   Thin appearing cachectic, frail  Patient unresponsive, apneic   HENT:   Head: Normocephalic and atraumatic.    Eyes:   Pupils pinpoint on arrival.  Right-sided gaze bilaterally   Neck: No JVD present. No tracheal deviation present. Cardiovascular: Tachycardia present. Pulmonary/Chest: She has no wheezes. She has no rales. Abdominal: Soft. Bowel sounds are normal. She exhibits no distension. There is no tenderness. There is no rebound. Musculoskeletal: She exhibits no edema, tenderness or deformity. Neurological: GCS eye subscore is 2. GCS verbal subscore is 1. GCS motor subscore is 4. Unresponsive to voice. Patient did withdraw to pain bilateral lower extremities and left upper extremity. Patient not moving right arm. Skin: Skin is warm and dry. Mottling of right arm       DIFFERENTIAL  DIAGNOSIS     PLAN (LABS / IMAGING / EKG):  Orders Placed This Encounter   Procedures    Urine Culture    Culture Blood #1    Culture Blood #1    MRSA DNA Probe, Nasal    XR CHEST PORTABLE    CT Head WO Contrast    CTA HEAD W CONTRAST    CTA NECK W CONTRAST    MRI brain without contrast    CBC auto differential    APTT    Protime-INR    Hemoglobin and hematocrit, blood    SODIUM (POC)    POTASSIUM (POC)    CHLORIDE (POC)    CALCIUM, IONIC (POC)    STROKE PANEL    Lactic Acid, Plasma    Urinalysis with microscopic    HEPATIC FUNCTION PANEL    Hepatitis Panel, Acute    Lipid panel - fasting    Blood gas, arterial    Sodium Lab    Levetiracetam Level    CBC Auto Differential    Basic Metabolic Panel    Magnesium    PHOSPHORUS    Osmolality    Osmolality    Blood gas, arterial    MISCELLANEOUS TESTING    Brain Natriuretic Peptide    Diet NPO Effective Now    Swallow screen by nursing before diet and oral medications started    Pain Assesment    Cardiac monitoring    Place nasal trumpet and supplemental oxygen at bedside during ketamine infusion    Telemetry monitoring    Neuro checks    Tobacco cessation education    Swallow screen by nursing before diet and oral medications started.     Stroke education    Vital signs per unit routine    Notify Physician    Strict Bedrest    Elevate Head of Bed     Notify ordering physician while on Hypertonic Saline    Full Code    Inpatient consult to Stroke Team    Inpatient consult to Neurosurgery    Inpatient consult to Neurosurgery    OT eval and treat    PT evaluation and treat    ABG draw    Initiate Oxygen Therapy Protocol    Initiate RT Adult Mechanical Ventilation Protocol    Manual Mechanical Ventilation    Pulse oximetry, continuous    End Tidal CO2 Continuous    Initiate Oxygen Therapy Protocol    ABG draw    POCT Glucose    Venous Blood Gas, POC    Creatinine W/GFR Point of Care    Lactic Acid, POC    POCT Glucose    Anion Gap (Calc) POC    POCT troponin    Arterial Blood Gas, POC    EKG 12 lead    ECHO Complete 2D W Doppler W Color    Insert arterial line    PATIENT STATUS (FROM ED OR OR/PROCEDURAL) Inpatient       MEDICATIONS ORDERED:  Orders Placed This Encounter   Medications    propofol 1000 MG/100ML injection     CHAPINCITO LOCKE: cabinet override    fentaNYL (SUBLIMAZE) 100 MCG/2ML injection     GLORIA MOSS: cabinet override    fentaNYL (SUBLIMAZE) injection 100 mcg    naloxone (NARCAN) 2 MG/2ML injection     SUNITA MÉNDEZ: cabinet override    etomidate (AMIDATE) injection 20 mg    succinylcholine (ANECTINE) injection 100 mg    midazolam (VERSED) injection 5 mg    midazolam (VERSED) injection 5 mg    DISCONTD: 0.9 % sodium chloride bolus    0.9 % sodium chloride bolus    iopamidol (ISOVUE-370) 76 % injection 90 mL    niCARdipine (CARDENE) 20 mg in 0.9 % sodium chloride 200 mL infusion    ketamine (KETALAR) injection 10.2 mg    DISCONTD: ketamine (KETALAR) 50 mg in dextrose 5 % 50 mL infusion    cefTRIAXone (ROCEPHIN) 1 g IVPB in 50 mL D5W minibag    azithromycin (ZITHROMAX) 500 mg in dextrose 5 % 250 mL IVPB    sodium chloride flush 0.9 % injection 10 mL    sodium chloride flush 0.9 % injection 10 REPORTED     RBC Morphology NOT REPORTED     Platelet Estimate NOT REPORTED    APTT   Result Value Ref Range    PTT 21.4 20.5 - 30.5 sec   Protime-INR   Result Value Ref Range    Protime 11.2 9.0 - 12.0 sec    INR 1.1    Hemoglobin and hematocrit, blood   Result Value Ref Range    POC Hemoglobin 17.6 (H) 12.0 - 16.0 g/dL    POC Hematocrit 52 (H) 36 - 46 %   SODIUM (POC)   Result Value Ref Range    POC Sodium 140 138 - 146 mmol/L   POTASSIUM (POC)   Result Value Ref Range    POC Potassium 3.4 (L) 3.5 - 4.5 mmol/L   CHLORIDE (POC)   Result Value Ref Range    POC Chloride 102 98 - 107 mmol/L   CALCIUM, IONIC (POC)   Result Value Ref Range    POC Ionized Calcium 1.22 1.15 - 1.33 mmol/L   STROKE PANEL   Result Value Ref Range    WBC 20.2 (H) 3.5 - 11.3 k/uL    RBC 4.75 3.95 - 5.11 m/uL    Hemoglobin 15.0 11.9 - 15.1 g/dL    Hematocrit 44.6 36.3 - 47.1 %    MCV 93.9 82.6 - 102.9 fL    MCH 31.6 25.2 - 33.5 pg    MCHC 33.6 28.4 - 34.8 g/dL    RDW 13.4 11.8 - 14.4 %    Platelets 933 704 - 022 k/uL    MPV 9.7 8.1 - 13.5 fL    NRBC Automated 0.0 0.0 per 100 WBC    Differential Type NOT REPORTED     Seg Neutrophils 89 (H) 36 - 65 %    Lymphocytes 6 (L) 24 - 43 %    Monocytes 4 3 - 12 %    Eosinophils % 0 (L) 1 - 4 %    Basophils 0 0 - 2 %    Immature Granulocytes 1 (H) 0 %    Segs Absolute 17.89 (H) 1.50 - 8.10 k/uL    Absolute Lymph # 1.24 1.10 - 3.70 k/uL    Absolute Mono # 0.87 0.10 - 1.20 k/uL    Absolute Eos # <0.03 0.00 - 0.44 k/uL    Basophils # 0.03 0.00 - 0.20 k/uL    Absolute Immature Granulocyte 0.12 0.00 - 0.30 k/uL    WBC Morphology NOT REPORTED     RBC Morphology NOT REPORTED     Platelet Estimate NOT REPORTED     Protime 11.3 9.0 - 12.0 sec    INR 1.1     PTT 22.2 20.5 - 30.5 sec    Myoglobin 245 (H) 25 - 58 ng/mL    Troponin T <0.03 <0.03 ng/mL    Troponin Interp          Glucose 296 (H) 70 - 99 mg/dL    BUN 11 6 - 20 mg/dL    CREATININE 0.65 0.50 - 0.90 mg/dL    Bun/Cre Ratio NOT REPORTED 9 - 20    Calcium 9.3 8.6 - 10.4 mg/dL    Sodium 135 135 - 144 mmol/L    Potassium 3.5 (L) 3.7 - 5.3 mmol/L    Chloride 98 98 - 107 mmol/L    CO2 17 (L) 20 - 31 mmol/L    Anion Gap 20 (H) 9 - 17 mmol/L    GFR Non-African American >60 >60 mL/min    GFR African American >60 >60 mL/min    GFR Comment          GFR Staging NOT REPORTED     Total CK 98 26 - 192 U/L    CK-MB 3.1 <5.4 ng/mL    % CKMB 3.2 (H) 0.0 - 3.0 %    CKMB Interpretation NORMAL ISOENZYME PATTERN    Lactic Acid, Plasma   Result Value Ref Range    Lactic Acid NOT REPORTED mmol/L    Lactic Acid, Whole Blood 4.5 (H) 0.7 - 2.1 mmol/L   Urinalysis with microscopic   Result Value Ref Range    Color, UA YELLOW YEL    Turbidity UA CLEAR CLEAR    Glucose, Ur 1+ (A) NEG    Bilirubin Urine NEGATIVE NEG    Ketones, Urine NEGATIVE NEG    Specific Gravity, UA 1.016 1.005 - 1.030    Urine Hgb SMALL (A) NEG    pH, UA 6.5 5.0 - 8.0    Protein, UA NEGATIVE NEG    Urobilinogen, Urine Normal NORM    Nitrite, Urine NEGATIVE NEG    Leukocyte Esterase, Urine MODERATE (A) NEG    -          WBC, UA 20 TO 50 0 - 5 /HPF    RBC, UA 2 TO 5 0 - 4 /HPF    Casts UA 0 TO 2 HYALINE 0 - 8 /LPF    Crystals UA NOT REPORTED NONE /HPF    Epithelial Cells UA 2 TO 5 0 - 5 /HPF    Renal Epithelial, Urine NOT REPORTED 0 /HPF    Bacteria, UA NOT REPORTED NONE    Mucus, UA NOT REPORTED NONE    Trichomonas, UA NOT REPORTED NONE    Amorphous, UA NOT REPORTED NONE    Other Observations UA NOT REPORTED NREQ    Yeast, UA NOT REPORTED NONE   HEPATIC FUNCTION PANEL   Result Value Ref Range    Alb 3.9 3.5 - 5.2 g/dL    Alkaline Phosphatase 101 35 - 104 U/L    ALT 6 5 - 33 U/L    AST 14 <32 U/L    Total Bilirubin 0.69 0.3 - 1.2 mg/dL    Bilirubin, Direct 0.21 <0.31 mg/dL    Bilirubin, Indirect 0.48 0.00 - 1.00 mg/dL    Total Protein 7.6 6.4 - 8.3 g/dL    Globulin NOT REPORTED 1.5 - 3.8 g/dL    Albumin/Globulin Ratio 1.1 1.0 - 2.5   Hepatitis Panel, Acute   Result Value Ref Range    Hepatitis B Surface Ag NONREACTIVE NR There is large old left MCA territory infarction, involving the left frontal, parietal and temporal lobes. There are old infarctions in the bilateral cerebellar hemispheres, left more than right. There is parenchymal volume loss. There is periventricular white matter low attenuation, likely related to mild chronic microvascular disease. There is no acute intracranial hemorrhage or abnormal extra-axial collection. There is no evidence of hydrocephalus. ORBITS: There is mild air-fluid level in the left maxillary sinus. The remainder of the visualized portion of the orbits demonstrate no acute abnormality. SINUSES: The visualized paranasal sinuses and mastoid air cells demonstrate no acute abnormality. SOFT TISSUES/SKULL:  No acute abnormality of the visualized skull or soft tissues. Acute large right MCA territory infarction, involving the right frontal, temporal, parietal lobes and the right basal ganglia/insular cortex. Associated mass effect and 4 mm right to left midline shift. No acute intracranial hemorrhage. 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 The results were reported to Dr. Briseyda Harding at 9:09 p.m. on September 2, 2018. Xr Chest Portable    Result Date: 9/3/2018  EXAMINATION: SINGLE XRAY VIEW OF THE CHEST, 9/2/2018 8:41 pm COMPARISON: Chest x-ray dated 04/16/2016 HISTORY: ORDERING SYSTEM PROVIDED HISTORY: SOB TECHNOLOGIST PROVIDED HISTORY: SOB FINDINGS: Endotracheal tube is in place with distal tip approximately 7.5 cm above the lucio. Enteric tube extends below the left hemidiaphragm with distal tip overlying the proximal stomach. The cardiomediastinal silhouette and pulmonary vasculature are within normal limits. Lungs are hyperinflated. No focal airspace consolidation, pneumothorax, or pleural effusion. Nipple shadow is noted overlying the mid right lung. No evidence of acute osseous abnormality.   No free air beneath the diaphragm. 1. Endotracheal tube with distal tip approximately 7.5 cm above the lucio. Enteric tube in appropriate position. 2. No evidence of acute intrathoracic process. 3. COPD. Cta Neck W Contrast    Result Date: 9/2/2018  EXAMINATION: CTA OF THE HEAD WITH CONTRAST; CTA OF THE NECK 9/2/2018 9:08 pm: TECHNIQUE: CTA of the head/brain was performed with the administration of intravenous contrast. Multiplanar reformatted images are provided for review. MIP images are provided for review. Dose modulation, iterative reconstruction, and/or weight based adjustment of the mA/kV was utilized to reduce the radiation dose to as low as reasonably achievable.; CTA of the neck was performed with the administration of intravenous contrast. Multiplanar reformatted images are provided for review. MIP images are provided for review. Stenosis of the internal carotid arteries measured using NASCET criteria. Dose modulation, iterative reconstruction, and/or weight based adjustment of the mA/kV was utilized to reduce the radiation dose to as low as reasonably achievable. COMPARISON: CT head September 2018, CTA head and neck July 16, 2015 HISTORY: ORDERING SYSTEM PROVIDED HISTORY: STROKE TECHNOLOGIST PROVIDED HISTORY: FINDINGS: CTA NECK: AORTIC ARCH/ARCH VESSELS: There is a normal branch pattern of the aortic arch. There is thrombus at the origin of the right subclavian artery, contributing to near occlusion. The left subclavian artery is patent without flow limiting stenosis. CAROTID ARTERIES: The common carotid arteries are normal in appearance without evidence of a flow limiting stenosis. The internal carotid arteries are normal in appearance without evidence of a flow limiting stenosis by NASCET criteria. No dissection or arterial injury is seen. VERTEBRAL ARTERIES: The vertebral arteries both arise from the subclavian arteries. There is left dominance of the vertebral arteries.   The left vertebral artery is within normal limits, without flow limiting stenosis. There is hypoplastic right vertebral artery, with minimal contrast opacification in the V2 segment and nonvisualized V1 segment, stable since July 16, 2015. SOFT TISSUES: The lung apices are clear. No cervical or superior mediastinal lymphadenopathy. The visualized portion of the larynx and pharynx appear unremarkable. The parotid, submandibular and thyroid glands demonstrate no acute abnormality. BONES: The visualized osseous structures appear unremarkable. CTA HEAD: ANTERIOR CIRCULATION: There is up to 50% focal stenosis in the left cavernous internal carotid artery. The right cavernous internal carotid artery and the bilateral supraclinoid internal carotid arteries are within normal limits. There is occlusion of right MCA at the proximal aspect of the M1 segment. The anterior cerebral and left middle cerebral ar artery demonstrate no focal stenosis. POSTERIOR CIRCULATION: The posterior cerebral arteries demonstrate no focal stenosis. There is left dominance of the vertebral arteries. The vertebral and basilar arteries appear unremarkable. BRAIN: There is known acute large right MCA territory infarction, involving the right frontal, parietal and temporal lobes, the right basal ganglia and insular cortex. There is associated mass effect and 5 mm right left midline shift. There is large old left MCA territory infarction. There are smaller infarctions involving the left bilateral cerebellar hemispheres, left more than right. Occlusion at the proximal aspect of the M1 segment of the right MCA, likely contributing to known acute large right MCA territory infarction. Thrombus at the proximal aspect of the right subclavian artery, contributing to near occlusion.  Hypoplastic right vertebral artery, with minimal contrast opacification in the V2 segment and nonvisualized V1 segment, stable since July 16, 2015. 50% focal stenosis in the left cavernous internal carotid artery secondary to atherosclerotic calcification. The bilateral common and cervical internal carotid arteries are patent without focal stenosis. The results were reported to Dr. Schuyler Jay at 9:54 p.m. on September 2, 2018. Cta Head W Contrast    Result Date: 9/2/2018  EXAMINATION: CTA OF THE HEAD WITH CONTRAST; CTA OF THE NECK 9/2/2018 9:08 pm: TECHNIQUE: CTA of the head/brain was performed with the administration of intravenous contrast. Multiplanar reformatted images are provided for review. MIP images are provided for review. Dose modulation, iterative reconstruction, and/or weight based adjustment of the mA/kV was utilized to reduce the radiation dose to as low as reasonably achievable.; CTA of the neck was performed with the administration of intravenous contrast. Multiplanar reformatted images are provided for review. MIP images are provided for review. Stenosis of the internal carotid arteries measured using NASCET criteria. Dose modulation, iterative reconstruction, and/or weight based adjustment of the mA/kV was utilized to reduce the radiation dose to as low as reasonably achievable. COMPARISON: CT head September 2018, CTA head and neck July 16, 2015 HISTORY: ORDERING SYSTEM PROVIDED HISTORY: STROKE TECHNOLOGIST PROVIDED HISTORY: FINDINGS: CTA NECK: AORTIC ARCH/ARCH VESSELS: There is a normal branch pattern of the aortic arch. There is thrombus at the origin of the right subclavian artery, contributing to near occlusion. The left subclavian artery is patent without flow limiting stenosis. CAROTID ARTERIES: The common carotid arteries are normal in appearance without evidence of a flow limiting stenosis. The internal carotid arteries are normal in appearance without evidence of a flow limiting stenosis by NASCET criteria. No dissection or arterial injury is seen. VERTEBRAL ARTERIES: The vertebral arteries both arise from the subclavian arteries.   There is left dominance of the vertebral arteries. The left vertebral artery is within normal limits, without flow limiting stenosis. There is hypoplastic right vertebral artery, with minimal contrast opacification in the V2 segment and nonvisualized V1 segment, stable since July 16, 2015. SOFT TISSUES: The lung apices are clear. No cervical or superior mediastinal lymphadenopathy. The visualized portion of the larynx and pharynx appear unremarkable. The parotid, submandibular and thyroid glands demonstrate no acute abnormality. BONES: The visualized osseous structures appear unremarkable. CTA HEAD: ANTERIOR CIRCULATION: There is up to 50% focal stenosis in the left cavernous internal carotid artery. The right cavernous internal carotid artery and the bilateral supraclinoid internal carotid arteries are within normal limits. There is occlusion of right MCA at the proximal aspect of the M1 segment. The anterior cerebral and left middle cerebral ar artery demonstrate no focal stenosis. POSTERIOR CIRCULATION: The posterior cerebral arteries demonstrate no focal stenosis. There is left dominance of the vertebral arteries. The vertebral and basilar arteries appear unremarkable. BRAIN: There is known acute large right MCA territory infarction, involving the right frontal, parietal and temporal lobes, the right basal ganglia and insular cortex. There is associated mass effect and 5 mm right left midline shift. There is large old left MCA territory infarction. There are smaller infarctions involving the left bilateral cerebellar hemispheres, left more than right. Occlusion at the proximal aspect of the M1 segment of the right MCA, likely contributing to known acute large right MCA territory infarction. Thrombus at the proximal aspect of the right subclavian artery, contributing to near occlusion.  Hypoplastic right vertebral artery, with minimal contrast opacification in the V2 segment and nonvisualized V1 segment, stable since July 16, 2015. 50% focal stenosis in the left cavernous internal carotid artery secondary to atherosclerotic calcification. The bilateral common and cervical internal carotid arteries are patent without focal stenosis. The results were reported to Dr. Cortez Green at 9:54 p.m. on September 2, 2018. EKG  None    All EKG's are interpreted by the Emergency Department Physician who either signs or Co-signs this chart in the absence of a cardiologist.    EMERGENCY DEPARTMENT COURSE:  11:13 PM: Patient's MAP in the 46s according the nurse. Levothroid ordered. Sepsis Times and Checklist  Vital Signs: BP: 92/77  Pulse: 136  Resp: (!) 34  Temp: 97.5 °F (36.4 °C) SpO2: 100 %  SIRS (>2)   Temp > 38.3C or < 36C   HR > 90   RR > 20   WBC > 12 or < 4 or >10% bands  SIRS (>2) and confirmed or suspected source of infection = Sepsis  Is Sepsis due to likely bacterial infection?: Yes    Sepsis Orders:  ·  CBC: Yes  ·  CMP: Yes  ·  PT/PTT: Yes  ·  Blood Cultures x2: Yes  ·  Urinalysis and Urine Culture: Yes  ·  Lactate: Yes  ·  Broad Spectrum Antibiotics Given (within 3 hours of sepsis identification,  after blood cultures):  Yes    (If unable to obtain IV access for IV antibiotics within 3 hours of identification of sepsis, IM antibiotics is acceptable.) Rocephin and azithromycin ordered. ·             If lactate >2.0 MUST repeat within 6 hours    If elevated, is elevated lactate from a likely infectious source    IV Fluid Bolus:  Is lactate > 4.0:  Yes  If lactate >  4.0 OR hypotension (with 2 BP readings) 30ml/kg crystalloid MUST be ordered. Fluids must be completed within 3 hours of sepsis identification. Septic Shock Identified (Initial lactate > 4.0 or 2 Hypotensive Blood Pressure readings within the first 6 hours): For septic shock sepsis focus physical exam must be completed AND documented (within 6 hours). Date: 9/3/18 Time: 11:40 AM   Sepsis focus exam completed.     For persistent hypotension after 30ml/kg fluid bolus vasopressors must be started (within 6 hours)    11:15 AM: Repeat exam performed. Patient with blood pressure with map in the 50s. Patient intubated and not following commands. Heart rate tachycardic with regular rhythm. Lungs with equal breath sounds bilaterally. PROCEDURES:  Intubation Procedure Note    Indication: Respiratory failure and airway protection    Consent: Unable to be obtained due to the emergent nature of this procedure. Medications Used: etomidate intravenously and succinycholine intravenously    Procedure: The patient was placed in the appropriate position. Cricoid pressure was not required. Intubation was performed by direct laryngoscopy using a laryngoscope and a 7.5 cuffed endotracheal tube. The cuff was then inflated and the tube was secured appropriately at a distance of 22 cm to the dental ridge. Initial confirmation of placement included bilateral breath sounds, an end tidal CO2 detector, absence of sounds over the stomach, tube fogging and adequate chest rise. A chest x-ray to verify correct placement of the tube showed appropriate tube position. The patient tolerated the procedure well. Complications: None    Central Line Placement Procedure Note    Indication: vascular access    Consent: Unable to be obtained due to the emergent nature of this procedure. Procedure: The patient was positioned appropriately and the skin over the right femoral vein was prepped with betadine and draped in a sterile fashion. Local anesthesia was obtained by infiltration using 1% Lidocaine without epinephrine. A large bore needle was used to identify the vein. A guide wire was then inserted into the vein through the needle. A triple lumen catheter was then inserted into the vessel over the guide wire using the Seldinger technique. All ports showed good, free flowing blood return and were flushed with saline solution.   The catheter was then securely fastened to the skin with sutures

## 2018-09-03 NOTE — PLAN OF CARE
Problem: OXYGENATION/RESPIRATORY FUNCTION  Goal: Patient will maintain patent airway  Problem: OXYGENATION/RESPIRATORY FUNCTION  Goal: Patient will maintain patent airway  Outcome: Ongoing  Goal: Patient will achieve/maintain normal respiratory rate/effort  Respiratory rate and effort will be within normal limits for the patient    Outcome: Ongoing      Problem: MECHANICAL VENTILATION  Goal: Oral health is maintained or improved  Outcome: Ongoing  Goal: ET tube will be managed safely  Outcome: Ongoing  Goal: Ability to express needs and understand communication  Outcome: Ongoing  Goal: Mobility/activity is maintained at optimum level for patient  Outcome: Ongoing  Goal: Patient will maintain patent airway  Outcome: Ongoing      Problem: ASPIRATION PRECAUTIONS  Goal: Patients risk of aspiration is minimized  Outcome: Ongoing      Problem: SKIN INTEGRITY  Goal: Skin integrity is maintained or improved  Outcome: Ongoing

## 2018-09-03 NOTE — CONSULTS
Department of Neurosurgery                                         Resident Consult Note      Reason for Consult:  Large right MCA CVA with midline shift  Requesting Physician:  Dr. Celestina Cho  Neurosurgeon: Dr. Donn Trevino    History Obtained From:  [facility] record, reason patient could not give history:  altered mental status and on ventilator    CHIEF COMPLAINT:       Altered mental status    HISTORY OF PRESENT ILLNESS:       The patient is a 56 y.o. female who presents with altered mental status. Per her significant other, she has a hx of IVDA and was shooting up with heroine all night. He found her today, unresponsive and drove her to the ED. He does not know when her last known well time was. He reports she had a medical hx of HTN only. Per her EMR, she has had a large left sided MCA infarct in the past and old cerebellar infarcts as well. Unknown residual deficits. She was supposed on be on a daily baby aspirin. Upper extremity involvement includes: no movement in the RUE. Lower extremity involvement includes: no movement in the RLE. No bilateral deficits. No fever. No recent head and neck infection. Right sided gaze preference that could not be overcome. Unable to get additional history secondary to altered mental status and now s/p induction meds and intubation. PAST MEDICAL HISTORY :       Past Medical History:        Diagnosis Date    CAD (coronary artery disease)     [facility]est[facility]) [facility] [address]     Hypertension     Unspecified cerebral artery occlusion with cerebral infarction     Urinary incontinence, urge 10/16/2017       Past Surgical History:    No past surgical history on file. Social History:   Social History     Social History    Marital status:      Spouse name: N/A    Number of children: N/A    Years of education: N/A     Occupational History    Not on file.      Social History Main Topics    Smoking status: Current Every Day Smoker     Packs/day: 2.00     Types: Cigarettes    Smokeless tobacco: Never Used    Alcohol use No    Drug use: Yes     Types: Marijuana, IV      Comment: daily heroin    Sexual activity: Not on file     Other Topics Concern    Not on file     Social History Narrative    No narrative on file       Family History:   No family history on file. Allergies:  Patient has no known allergies. Home Medications:  Prior to Admission medications    Medication Sig Start Date End Date Taking?  Authorizing Provider   metoprolol succinate (TOPROL XL) 50 MG extended release tablet take 1 tablet by mouth once daily 6/22/18   Luis Carlos Masterson MD   metoprolol succinate (TOPROL XL) 50 MG extended release tablet Take 1 tablet by mouth daily 2/13/18   Luis Carlos Masterson MD   aspirin 81 MG chewable tablet Take 1 tablet by mouth daily 11/8/17   Luis Carlos Masterson MD   acetaminophen-codeine (TYLENOL #3) 300-30 MG per tablet Take 1 tablet by mouth every 6 hours as needed for Pain 10/25/17   Luis Carlos Masterson MD   gabapentin (NEURONTIN) 300 MG capsule Take 1 capsule by mouth 3 times daily 10/16/17   Luis Carlos Masterson MD   Incontinence Supply Disposable (INCONTINENCE BRIEF MEDIUM) MISC Use daily 10/16/17   Luis Carlos Masterson MD   metoprolol succinate (TOPROL XL) 25 MG extended release tablet Take 0.5 tablets by mouth daily 9/15/16   Luis Carlos Masterson MD   docusate (COLACE, DULCOLAX) 100 MG CAPS Take 100 mg by mouth 2 times daily as needed (constipation) 9/15/16   Luis Carlos Masterson MD   levETIRAcetam (KEPPRA) 500 MG tablet Take 1 tablet by mouth 2 times daily 9/15/16   Luis Carlos Masterson MD   famotidine (PEPCID) 20 MG tablet Take 1 tablet by mouth daily 9/15/16   Luis Carlos Masterson MD   atorvastatin (LIPITOR) 80 MG tablet Take 0.5 tablets by mouth nightly 9/15/16   Luis Carlos Masterson MD   metFORMIN (GLUCOPHAGE) 500 MG tablet Take 1 tablet by mouth daily (with breakfast) 9/15/16   Luis Carlos Masterson MD   oxybutynin (DITROPAN-XL) 5 MG CR tablet Take 1 tablet by mouth nightly 09/02/2018     09/02/2018     09/02/2018     10/15/2011    MCV 94.9 09/02/2018    MCV 93.9 09/02/2018    MCH 31.7 09/02/2018    MCH 31.6 09/02/2018    MCHC 33.4 09/02/2018    MCHC 33.6 09/02/2018    RDW 13.3 09/02/2018    RDW 13.4 09/02/2018    LYMPHOPCT 6 09/02/2018    LYMPHOPCT 6 09/02/2018    MONOPCT 5 09/02/2018    MONOPCT 4 09/02/2018    BASOPCT 0 09/02/2018    BASOPCT 0 09/02/2018    MONOSABS 0.92 09/02/2018    MONOSABS 0.87 09/02/2018    LYMPHSABS 1.24 09/02/2018    LYMPHSABS 1.24 09/02/2018    EOSABS <0.03 09/02/2018    EOSABS <0.03 09/02/2018    BASOSABS 0.05 09/02/2018    BASOSABS 0.03 09/02/2018    DIFFTYPE NOT REPORTED 09/02/2018    DIFFTYPE NOT REPORTED 09/02/2018     CMP:    Lab Results   Component Value Date     09/02/2018    K 3.5 09/02/2018    CL 98 09/02/2018    CO2 17 09/02/2018    BUN 11 09/02/2018    CREATININE 0.65 09/02/2018    GFRAA >60 09/02/2018    LABGLOM >60 09/02/2018    GLUCOSE 296 09/02/2018    GLUCOSE 122 10/13/2011    PROT 8.8 09/19/2016    LABALBU 4.7 09/19/2016    CALCIUM 9.3 09/02/2018    BILITOT 0.30 09/19/2016    ALKPHOS 119 09/19/2016    AST 23 09/19/2016    ALT 10 09/19/2016     Radiology Review:  Head CT:   Acute large right MCA territory infarction, involving the right frontal,   temporal, parietal lobes and the right basal ganglia/insular cortex. Associated mass effect and 4 mm right to left midline shift.       No acute intracranial hemorrhage.       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 CXR:   1. Endotracheal tube with distal tip approximately 7.5 cm above the lucio. Enteric tube in appropriate position. 2. No evidence of acute intrathoracic process.    3. COPD           ASSESSMENT AND PLAN:       Patient Active Problem List   Diagnosis    Cerebral infarction ([facility] [address]    Somnolence    CHF (congestive heart failure) (HCC)    Heroin overdose    Lactic acidosis    Hyperglycemia    Acute respiratory failure (HCC)    H/O essential hypertension    Smoker    Seizure-like activity (HCC)    Bilious vomiting with nausea    Urinary incontinence, urge    Acute cerebrovascular accident (CVA) ([facility] [address]       56 y.o. female who presents with an acute large right MCA infarct, AMS, old large left MCA infarct . Patient care will be discussed with attending, will reevaluate patient along with attending.           - CTLS recommendations: cleared  - HOB: 30 degrees  - Ok to begin prophylactic anticoagulation from neurosurgery stand point. However, we recommend careful evaluation of all other risk factors associated with anticoagulation therapy as applied to this patient's medical condition  - No neurosurgical interventions planned for now  - We recommend SBP < 160  - We recommend blood glucose goal of < 180  - Neuro checks per floor protocol  - Additional recommendations may follow   -3% NaCl at 50/hr   -Mannitol 25 g Q4H   -Brain MRI      Please contact neurosurgery with any changes in patients neurologic status. Thank you for your consult.        Chiara Paiz MD   NS pager [phone]  9/2/2018  11:33 PM

## 2018-09-03 NOTE — PROGRESS NOTES
Date: 9/2/2018  Time: 1958  Patient identity confirmed:  Yes  Indications: Acute respiratory failure  Preoxygenation: Yes    Laryngoscope size and type Jonny 4  Airway introducer used: No  Evac: Yes  ETT size:a 7.5 cuffed  Number of attempts:1   Cords visualized:  [x] Clearly  [] Poorly  Breath sounds present bilaterally: Yes   ETCO2   [x] Positive   ETT secured at  23 LIP    ETT secured with MARYJO  Chest x-ray ordered: Yes     Difficult airway:    No       If yes, was red tape placed around ETT:  N/A    Was this a Code Situation:    No       If yes, was the rescue pod used:  N/A    BP: (!) 201/122        Procedure performed by: Dr. Rebecca Garcia  8:13 PM

## 2018-09-03 NOTE — CONSULTS
[facility]  In Patient Cardiology Consult             Cardiology   Consult Note          History Obtained From:  domestic partner, electronic medical record    HISTORY OF PRESENT ILLNESS:      The patient is a 56 y.o. female PMH heroin use, left MCA stroke 07/2015, CHF, CAD, and HTN presented w/ AMS. Patient reports was shooting heroin all night and was found by sig other unreponsive with decreased breathing and was brought to ED. In the ED, narcan was given with minimal effect. Only left upper and lower extremity moving, right gaze deviation. Patient was intubated and sent to neuro ICU. Due to high BP and tachy, patient started on cardene gtt w/ labetalol PRN. proBNP increased w/ abn EKG and we were consulted. Past Medical History:    Past Medical History:   Diagnosis Date    CAD (coronary artery disease)     CHF [facility]congestive heart failure) ([facility] [address]     Hypertension     Unspecified cerebral artery occlusion with cerebral infarction     Urinary incontinence, urge 10/16/2017     Past Surgical History:    No past surgical history on file. Home Medications:  Prior to Admission medications    Medication Sig Start Date End Date Taking?  Authorizing Provider   metoprolol succinate (TOPROL XL) 50 MG extended release tablet take 1 tablet by mouth once daily 6/22/18   Casey Heller MD   metoprolol succinate (TOPROL XL) 50 MG extended release tablet Take 1 tablet by mouth daily 2/13/18   Casey Heller MD   aspirin 81 MG chewable tablet Take 1 tablet by mouth daily 11/8/17   Casey Heller MD   acetaminophen-codeine (TYLENOL #3) 300-30 MG per tablet Take 1 tablet by mouth every 6 hours as needed for Pain 10/25/17   Casey Heller MD   gabapentin (NEURONTIN) 300 MG capsule Take 1 capsule by mouth 3 times daily 10/16/17   Casey Heller MD   Incontinence Supply Disposable (INCONTINENCE BRIEF MEDIUM) MISC Use daily 10/16/17   Casey Heller MD   metoprolol succinate (TOPROL XL) 25 MG extended release tablet Take 0.5 tablets by mouth daily 9/15/16   Sunshine Cooper MD   docusate (COLACE, DULCOLAX) 100 MG CAPS Take 100 mg by mouth 2 times daily as needed (constipation) 9/15/16   Sunshine Cooper MD   levETIRAcetam (KEPPRA) 500 MG tablet Take 1 tablet by mouth 2 times daily 9/15/16   Sunshine Cooper MD   famotidine (PEPCID) 20 MG tablet Take 1 tablet by mouth daily 9/15/16   Sunshine Cooper MD   atorvastatin (LIPITOR) 80 MG tablet Take 0.5 tablets by mouth nightly 9/15/16   Sunshine Cooper MD   metFORMIN (GLUCOPHAGE) 500 MG tablet Take 1 tablet by mouth daily (with breakfast) 9/15/16   Sunshine Cooper MD   oxybutynin (DITROPAN-XL) 5 MG CR tablet Take 1 tablet by mouth nightly 7/31/15   Chema Gray MD     Current Inpatient Medications:   sodium chloride flush  10 mL Intravenous 2 times per day    enoxaparin  40 mg Subcutaneous Daily    famotidine (PEPCID) injection  20 mg Intravenous BID    atorvastatin  40 mg Per NG tube Nightly    aspirin  81 mg Per NG tube Daily    mannitol  25 g Intravenous Q4H    levetiracetam  500 mg Intravenous Q12H    insulin lispro  0-6 Units Subcutaneous TID WC    insulin lispro  0-3 Units Subcutaneous Nightly    ketamine  0.25 mg/kg Intravenous Once     Allergies:  Patient has no known allergies. Social History:    Social History     Social History    Marital status:      Spouse name: N/A    Number of children: N/A    Years of education: N/A     Occupational History    Not on file. Social History Main Topics    Smoking status: Current Every Day Smoker     Packs/day: 2.00     Types: Cigarettes    Smokeless tobacco: Never Used    Alcohol use No    Drug use: Yes     Types: Marijuana, IV      Comment: daily heroin    Sexual activity: Not on file     Other Topics Concern    Not on file     Social History Narrative    No narrative on file     Family History:   No family history on file.     REVIEW OF SYSTEMS:     Unable to be obtained due to patient mental status    PHYSICAL EXAM:    VITALS:  BP (!) 143/70   Pulse 94   Temp 98.4 °F (36.9 °C) (Oral)   Resp 18   Ht 5' (1.524 m)   Wt 96 lb 9 oz (43.8 kg)   LMP  (LMP Unknown)   SpO2 100%   BMI 18.86 kg/m²   CONSTITUTIONAL: intubated, sedated  HEENT:pupils equal, round, sclera clear, conjunctiva normal.   Supple neck. NO THYROMEGALY  LUNGS:  No increased work of breathing, auscultation: equal air entry b/l, no rales, rhonchi, coarse breath sounds noted   CARDIOVASCULAR:  Normal apical impulse, regular rate and rhythm, NO murmurs appreciated  JVP: normal   Carotids:NL  ABDOMEN:  No scars, normal bowel sounds, soft, non-distended, non-tender, no masses palpated, no hepatosplenomegally  MUSCULOSKELETAL:no redness, warmth, or swelling of the joints  NEUROLOGIC:  Awake, alert, oriented to name, place and time. SKIN:  no bruising or bleeding, normal skin color, texture, turgor  LE: no edema noted    DATA:   ECG:  Sinus tachycardia  Biatrial enlargement  Possible Inferior infarct (cited on or before 16-APR-2016)  T wave abnormality, consider lateral ischemia  Abnormal ECG  When compared with ECG of 19-APR-2016 06:55,  Vent.  rate has increased BY  59 BPM  Non-specific change in ST segment in Inferior leads  Nonspecific T wave abnormality no longer evident in Inferior leads     Lab:   Lab Results   Component Value Date     09/03/2018    K 3.5 09/02/2018    CL 98 09/02/2018    CO2 17 09/02/2018    BUN 11 09/02/2018    CREATININE 0.65 09/02/2018    GFRAA >60 09/02/2018    LABGLOM >60 09/02/2018    GLUCOSE 296 09/02/2018    GLUCOSE 122 10/13/2011    PROT 7.6 09/02/2018    LABALBU 3.9 09/02/2018    CALCIUM 9.3 09/02/2018    BILITOT 0.69 09/02/2018    ALKPHOS 101 09/02/2018    AST 14 09/02/2018    ALT 6 09/02/2018     Magnesium:    Lab Results   Component Value Date    MG 1.7 07/19/2015     Warfarin PT/INR:    Lab Results   Component Value Date    PROTIME 11.2 09/02/2018    PROTIME 11.3 09/02/2018 PROTIME 10.4 10/13/2011    INR 1.1 09/02/2018    INR 1.1 09/02/2018     TSH:  No results found for: TSH  BMP:    Lab Results   Component Value Date     09/03/2018    K 3.5 09/02/2018    CL 98 09/02/2018    CO2 17 09/02/2018    BUN 11 09/02/2018    LABALBU 3.9 09/02/2018    CREATININE 0.65 09/02/2018    CALCIUM 9.3 09/02/2018    GFRAA >60 09/02/2018    LABGLOM >60 09/02/2018    GLUCOSE 296 09/02/2018    GLUCOSE 122 10/13/2011     FLP:    Lab Results   Component Value Date    CHOL 151 07/17/2015    TRIG 84 07/17/2015    HDL 56 07/17/2015    LDLCHOLESTEROL 78 07/17/2015       IMPRESSION:  Patient Active Problem List   Diagnosis    C[facility]bral infarction ([facility] [address]    Somnolence    [facility]    Heroin overdose    Lactic acidosis    Hyperglycemia    Acute respiratory failure ([facility] [address]    H/O essential hypertension    Smoker    Seizure-like activity ([facility])    Bilious vomiting with nausea    Urinary incontinence, urge    Acute cerebrovascular accident (CVA) ([facility] [address]         ASSESSMENT  Elevated proBNP with abd EKG: troponin is not elevated, EKG changes are chronic, seen on prev EKG, troponin increase can be due to increase right heart strain likely stemming from drug use evidenced by tachycardia with extreme HTN  Right MCA infarct: seen on MRI this admission, on manitol 25% due to midline shift  CAD: severe LAD and RCA disease based on cath on 4/18/2016  CHF: ECHO done 2015 shows EF 35%,     PLAN  Record show pt on toprol 50mg, aspirin, and lipitor 80, continue aspirin and lipitor, can start lopressor once off the cardene gtt      William Pabon M.D. Attending Cardiologist Addendum: I have reviewed and performed the history, physical, subjective, objective, assessment, and plan with the resident/fellow and agree with the note. I performed the history and physical personally. I have made changes to the note above as needed.     New R MCA CVA  IV drug abuse  Resp failure on vent  Abnormal ECG - check 2d echo due to new CVA  - will follow     Thank you for allowing me to participate in the care of this patient, please do not hesitate to call if you have any questions. Robb Newman DO, [facility], [facility]  [email or web address]  [phone]

## 2018-09-03 NOTE — PROGRESS NOTES
Pharmacy Note  Vancomycin Consult    Denisa Cancer is a 56 y.o. female started on Vancomycin for Leukocytosis ; consult received from Dr. Merlinda Part to manage therapy. Also receiving the following antibiotics: Azithromycin . 500 mg IV x 1 and Rocephin 1 gm q 12 hrs    Patient Active Problem List   Diagnosis    [facility] ([facility] [address]    Somnolence    CHF (congestive heart failure) ([facility])    Heroin overdose    Lactic acidosis    Hyperglycemia    [facility]    H/O essential hypertension    Smoker    Seizure-like activity ([facility])    Bilious vomiting with nausea    Urinary incontinence, urge    [facility]) [facility]       Allergies:  Patient has no known allergies. Temp max: 98.4 F    Recent Labs      09/02/18 2034   BUN  11       Recent Labs      09/02/18 2031 09/02/18 2034   CREATININE  0.63  0.65       Recent Labs      09/02/18 2034 09/03/18   0629   WBC  20.2*  20.0*  15.8*         Intake/Output Summary (Last 24 hours) at 09/03/18 1233  Last data filed at 09/03/18 1200   Gross per 24 hour   Intake 3848 ml   Output 2635 ml   Net 1213 ml       Culture Date      Source                       Results  9/2/18                  Blood                         pending  9/2/18                  Urine                          pending    Ht Readings from Last 1 Encounters:   09/03/18 5' (1.524 m)        Wt Readings from Last 1 Encounters:   09/03/18 96 lb 9 oz (43.8 kg)         Body mass index is 18.86 kg/m². CrCl cannot be calculated (Unknown ideal weight.). Goal Trough Level: 10 - 20 mcg/mL    Assessment/Plan:  Will initiate vancomycin 750 mg IV every 12 hours. Timing of trough level will be determined based on culture results, renal function, and clinical response. Thank you for the consult. Will continue to follow.   Malinda Hurst, Pharm D.  9/3/2018  12:36 PM

## 2018-09-03 NOTE — PROGRESS NOTES
Neuro critical care service:    Large right hemispheric infarction  Respiratory failure  History of seizures  Lactic acidosis    56year-old patient admitted to neuro ICU for large right hemispheric acute infarction. She was intubated emergently in the ED on arrival with concerns of respiratory failure with history by her  of IV drug / heroine abuse prior to she was brought in. She received Narcan but not with much improvement. She also has history of large left MCA infarction and reportedly is on antiseizure medications with history of seizures. She was briefly on vasopressor infusion overnight for hypotension with systolic blood pressure in 70s. She received IV fluid bolus and was started broad-spectrum antibiotics. UA suggestive of UTI, urine culture and blood cultures awaited. Mild lactic acidosis seen. Repeat lactic acid now. She also had history of coronary artery disease and history of CHF based on echocardiogram in 2016. Continue aspirin and statins. Start metoprolol 25 twice a day if no troubles with blood pressure. Has been weaned off. Target systolic blood pressure less than 180. MRI brain reviewed that shows large right hemispheric infarction,  also has evidence of old left MCA stroke with encephalomalacia changes. Continue antiseizure medications. Get LTME monitoring with reported history of seizures. She has a right femoral central line. Start 3% hypertonic saline to target sodium 145-150. Discontinue scheduled mannitol dosing. Discontinue Campos.  UDS check if not completed on admission   Lovenox for DVT prophylaxis    My Balbuena MD

## 2018-09-03 NOTE — PLAN OF CARE
Problem: MECHANICAL VENTILATION  Goal: Patient will maintain patent airway  Outcome: Ongoing    Goal: Oral health is maintained or improved  Outcome: Ongoing    Goal: ET tube will be managed safely  Outcome: Ongoing    Goal: Ability to express needs and understand communication  Outcome: Ongoing    Goal: Mobility/activity is maintained at optimum level for patient  Outcome: Ongoing      Problem: SKIN INTEGRITY  Goal: Skin integrity is maintained or improved  Outcome: Ongoing  Ventilator Bronchodilator assessment    Breath sounds: rhonchi  Inspiratory Pressure: [address] [facility] [address] [id number]    Patient assessed at level 1          [x]    Bronchodilator Assessment    BRONCHODILATOR ASSESSMENT SCORE  Score 0 (Home) 1 2 3 4   Breath Sounds   []  Chronic Ventilator: Patient at baseline []  Mild Wheezes/ Clear []  Intermittent wheezes with good air entry []  Bilateral/unilateral wheezing with diminished air entry []  Insp/Exp wheeze and/or poor aeration   Ventilator Pressures   []  Chronic Ventilator [x]  Insp. Pressure less than 25 cm H20 []  Insp. Pressure less than 25 cm H20 []  Insp. Pressure exceeds 25 cm H20 []  Insp.  Pressure exceeds 30 cm H20   Plateau Pressure []  NA   [x]  Plateau Pressure less than 4  []  Plateau Pressure less than or equal to 5 []  Plateau Pressure greater than or equal to 6 []  Plateau Pressure greater than or equal to 8       RENNY AZUL  9:57 AM

## 2018-09-03 NOTE — ED PROVIDER NOTES
[facility]     Emergency Department     Faculty Note/ Attestation      Pt Name: Denisa Cancer                                       MRN: [id number]  Marie 1960  Date of evaluation: 9/2/2018    Patients PCP:    Nelda Brown MD    Attestation  I performed a history and physical examination of the patient and discussed management with the resident. I reviewed the residents note and agree with the documented findings and plan of care. Any areas of disagreement are noted on the chart. I was personally present for the key portions of any procedures. I have documented in the chart those procedures where I was not present during the key portions. I have reviewed the emergency nurses triage note. I agree with the chief complaint, past medical history, past surgical history, allergies, medications, social and family history as documented unless otherwise noted below. For Physician Assistant/ Nurse Practitioner cases/documentation I have personally evaluated this patient and have completed at least one if not all key elements of the E/M (history, physical exam, and MDM). Additional findings are as noted. Initial Screens:             Vitals:    Vitals:    09/02/18 2030 09/02/18 2035 09/02/18 2130 09/02/18 2145   BP: (!) 188/110 (!) 177/111 112/80    Pulse: 118 116 111 112   Resp: (!) 31 27 (!) 31 30   Temp:       TempSrc:       SpO2: 98% 100%  100%   Weight:           CHIEF COMPLAINT       Chief Complaint   Patient presents with    Drug Overdose     brought to front door of triage, sts patient \"took a lot of drugs last night\"   History obtained from RN who talked with fiends. The pt was partying with friends last night doing heroin and they found her in the front seat of her car this evening and she was not breathing well and responsive. DIAGNOSTIC RESULTS     RADIOLOGY:   XR CHEST PORTABLE   Preliminary Result   1.  Endotracheal tube with distal tip approximately 7.5 cm above the lucio. Enteric tube in appropriate position. 2. No evidence of acute intrathoracic process. 3. COPD. CT Head WO Contrast   Final Result   Acute large right MCA territory infarction, involving the right frontal,   temporal, parietal lobes and the right basal ganglia/insular cortex. Associated mass effect and 4 mm right to left midline shift. No acute intracranial hemorrhage. 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 The results were reported to Dr. Vanessa Banks at 9:09 p.m. on September 2, 2018.          CTA HEAD W CONTRAST    (Results Pending)   CTA NECK W CONTRAST    (Results Pending)   infarcts concerning neurosurgeon neuro hefting consult the patient be admitted to the neuro ICU    LABS:  Labs Reviewed   CBC WITH AUTO DIFFERENTIAL - Abnormal; Notable for the following:        Result Value    WBC 20.0 (*)     Seg Neutrophils 89 (*)     Lymphocytes 6 (*)     Eosinophils % 0 (*)     Segs Absolute 17.74 (*)     All other components within normal limits   HGB/HCT - Abnormal; Notable for the following:     POC Hemoglobin 17.6 (*)     POC Hematocrit 52 (*)     All other components within normal limits   POTASSIUM (POC) - Abnormal; Notable for the following:     POC Potassium 3.4 (*)     All other components within normal limits   STROKE PANEL - Abnormal; Notable for the following:     WBC 20.2 (*)     Seg Neutrophils 89 (*)     Lymphocytes 6 (*)     Eosinophils % 0 (*)     Immature Granulocytes 1 (*)     Segs Absolute 17.89 (*)     Myoglobin 245 (*)     Glucose 296 (*)     Potassium 3.5 (*)     CO2 17 (*)     Anion Gap 20 (*)     % CKMB 3.2 (*)     All other components within normal limits   VENOUS BLOOD GAS, POINT OF CARE - Abnormal; Notable for the following:     pH, Jose 7.318 (*)     pO2, Jose 28.9 (*)     O2 Sat, Jose 49 (*)     All other components within normal limits   LACTIC ACID,POINT OF CARE - Abnormal; Notable for the disturbance  Overdose  Uremia  Trauma  Infections  Poison  Stroke    Based on the presentation the patient is unlikely to suffer from poison overdoses the Noroxin did not help patient has no smell of alcohol intoxication more concerning would be VI disturbances such as hyperglycemia however no other I disturbances patient has no signs of severe uremia no signs of trauma however CT showing signs of stroke    CRITICAL CARE: There was a high probability of clinically significant/life threatening deterioration in this patient's condition which required my urgent intervention. Total critical care time was 31 minutes. This excludes any time for separately reportable procedures. Peter Jose, , RDMS.   Attending Emergency Physician          Peter Jose DO  09/02/18 2203

## 2018-09-03 NOTE — PLAN OF CARE
Problem: Nutrition  Goal: Optimal nutrition therapy  Outcome: Ongoing  Nutrition Problem: Inadequate oral intake  Intervention: Food and/or Nutrient Delivery: Start Tube Feeding  Nutritional Goals: initiation of nutrition support within 48 hours

## 2018-09-03 NOTE — PROGRESS NOTES
Resident Progress Note  Date: 9/3/2018  Time: 3:25 PM  Patient Name: Jean Mendoza  Patient : 1960  Room/Bed: [address] [place] [address]  Allergies: No Known Allergies    Interval History:  MRI overnight showed acute R infarct in MCA territory, L old infarct in MCA territory. 3mm midline shift. Afebrile. BP controlled with cardene. On propofol for sedation. . Loaded with keppra. No acute events overnight. Current Medications:  Scheduled Meds:   sodium chloride flush  10 mL Intravenous 2 times per day    enoxaparin  40 mg Subcutaneous Daily    famotidine (PEPCID) injection  20 mg Intravenous BID    atorvastatin  40 mg Per NG tube Nightly    aspirin  81 mg Per NG tube Daily    levetiracetam  500 mg Intravenous Q12H    insulin lispro  0-6 Units Subcutaneous TID WC    insulin lispro  0-3 Units Subcutaneous Nightly    cefTRIAXone (ROCEPHIN) IV  1 g Intravenous Q12H    vancomycin (VANCOCIN) intermittent dosing (placeholder)   Other RX Placeholder    [START ON 2018] vancomycin  750 mg Intravenous Q12H    ketamine  0.25 mg/kg Intravenous Once       Continuous Infusions:   propofol Stopped (18 0801)    sodium chloride 100 mL/hr (18 1200)    niCARdipine      norepinephrine Stopped (18 2349)       Vitals:  Patient Vitals for the past 8 hrs:   BP Temp Temp src Pulse Resp SpO2   18 1400 (!) 180/85 - - 117 23 100 %   18 1300 (!) 175/91 - - 109 21 100 %   18 1200 (!) 156/86 98.3 °F (36.8 °C) Oral 107 19 100 %   18 1137 - - - 109 20 100 %   18 1110 (!) 155/94 - - 124 23 100 %   18 0955 - - - 119 25 100 %   18 0828 - - - 89 18 100 %     Height and Weight  Height: 5' (152.4 cm)  Weight: 96 lb 9 oz (43.8 kg)  Weight Method: Estimated  BSA (Calculated - sq m): 0.92 sq meters  BMI (Calculated): 18.9  Body mass index is 18.86 kg/m².   <16 Severe malnutrition  16-16.99 Moderate malnutrition  17-18.49 Mild malnutrition  18.5-24.9 Normal  25-29.9 Overweight (not obese)  30-34.9 Obese class 1 (Low Risk)  35-39.9 Obese class 2 (Moderate Risk)  ? 40 Obese class 3 (High Risk)    Labs (last 48 hours):  Recent Results (from the past 48 hour(s))   Venous Blood Gas, POC    Collection Time: 09/02/18  8:31 PM   Result Value Ref Range    pH, Jose 7.318 (L) 7.320 - 7.430    pCO2, Jose 48.4 41.0 - 51.0 mm Hg    pO2, Jose 28.9 (L) 30.0 - 50.0 mm Hg    HCO3, Venous 24.8 22.0 - 29.0 mmol/L    Total CO2, Venous 26 23.0 - 30.0 mmol/L    Negative Base Excess, Jose 2 0.0 - 2.0    Positive Base Excess, Jose NOT REPORTED 0.0 - 3.0    O2 Sat, Jose 49 (L) 60.0 - 85.0 %    O2 Device/Flow/% NOT REPORTED     Edilberto Test NOT REPORTED     Sample Site NOT REPORTED     Mode NOT REPORTED     FIO2 NOT REPORTED     Pt Temp NOT REPORTED     POC pH Temp NOT REPORTED     POC pCO2 Temp NOT REPORTED mm Hg    POC pO2 Temp NOT REPORTED mm Hg   Hemoglobin and hematocrit, blood    Collection Time: 09/02/18  8:31 PM   Result Value Ref Range    POC Hemoglobin 17.6 (H) 12.0 - 16.0 g/dL    POC Hematocrit 52 (H) 36 - 46 %   Creatinine W/GFR Point of Care    Collection Time: 09/02/18  8:31 PM   Result Value Ref Range    POC Creatinine 0.63 0.51 - 1.19 mg/dL    GFR Comment >60 >60 mL/min    GFR Non-African American >60 >60 mL/min    GFR Comment         SODIUM (POC)    Collection Time: 09/02/18  8:31 PM   Result Value Ref Range    POC Sodium 140 138 - 146 mmol/L   POTASSIUM (POC)    Collection Time: 09/02/18  8:31 PM   Result Value Ref Range    POC Potassium 3.4 (L) 3.5 - 4.5 mmol/L   CHLORIDE (POC)    Collection Time: 09/02/18  8:31 PM   Result Value Ref Range    POC Chloride 102 98 - 107 mmol/L   CALCIUM, IONIC (POC)    Collection Time: 09/02/18  8:31 PM   Result Value Ref Range    POC Ionized Calcium 1.22 1.15 - 1.33 mmol/L   Lactic Acid, POC    Collection Time: 09/02/18  8:31 PM   Result Value Ref Range    POC Lactic Acid 5.33 (H) 0.56 - 1.39 mmol/L   POCT Glucose    Collection Time: 09/02/18  8:31 PM   Result Value Ref Range    POC Glucose 317 (H) 74 - 100 mg/dL   Anion Gap (Calc) POC    Collection Time: 09/02/18  8:31 PM   Result Value Ref Range    Anion Gap 13 7 - 16 mmol/L   CBC auto differential    Collection Time: 09/02/18  8:34 PM   Result Value Ref Range    WBC 20.0 (H) 3.5 - 11.3 k/uL    RBC 4.70 3.95 - 5.11 m/uL    Hemoglobin 14.9 11.9 - 15.1 g/dL    Hematocrit 44.6 36.3 - 47.1 %    MCV 94.9 82.6 - 102.9 fL    MCH 31.7 25.2 - 33.5 pg    MCHC 33.4 28.4 - 34.8 g/dL    RDW 13.3 11.8 - 14.4 %    Platelets 026 730 - 427 k/uL    MPV 9.6 8.1 - 13.5 fL    NRBC Automated 0.0 0.0 per 100 WBC    Differential Type NOT REPORTED     Seg Neutrophils 89 (H) 36 - 65 %    Lymphocytes 6 (L) 24 - 43 %    Monocytes 5 3 - 12 %    Eosinophils % 0 (L) 1 - 4 %    Basophils 0 0 - 2 %    Immature Granulocytes 0 0 %    Segs Absolute 17.74 (H) 1.50 - 8.10 k/uL    Absolute Lymph # 1.24 1.10 - 3.70 k/uL    Absolute Mono # 0.92 0.10 - 1.20 k/uL    Absolute Eos # <0.03 0.00 - 0.44 k/uL    Basophils # 0.05 0.00 - 0.20 k/uL    Absolute Immature Granulocyte 0.08 0.00 - 0.30 k/uL    WBC Morphology NOT REPORTED     RBC Morphology NOT REPORTED     Platelet Estimate NOT REPORTED    APTT    Collection Time: 09/02/18  8:34 PM   Result Value Ref Range    PTT 21.4 20.5 - 30.5 sec   Protime-INR    Collection Time: 09/02/18  8:34 PM   Result Value Ref Range    Protime 11.2 9.0 - 12.0 sec    INR 1.1    STROKE PANEL    Collection Time: 09/02/18  8:34 PM   Result Value Ref Range    WBC 20.2 (H) 3.5 - 11.3 k/uL    RBC 4.75 3.95 - 5.11 m/uL    Hemoglobin 15.0 11.9 - 15.1 g/dL    Hematocrit 44.6 36.3 - 47.1 %    MCV 93.9 82.6 - 102.9 fL    MCH 31.6 25.2 - 33.5 pg    MCHC 33.6 28.4 - 34.8 g/dL    RDW 13.4 11.8 - 14.4 %    Platelets 289 999 - 195 k/uL    MPV 9.7 8.1 - 13.5 fL    NRBC Automated 0.0 0.0 per 100 WBC    Differential Type NOT REPORTED     Seg Neutrophils 89 (H) 36 - 65 %    Lymphocytes 6 (L) 24 - 43 %    Monocytes 4 3 - 12 %    Eosinophils % 0 (L) 1 - 4 % Basophils 0 0 - 2 %    Immature Granulocytes 1 (H) 0 %    Segs Absolute 17.89 (H) 1.50 - 8.10 k/uL    Absolute Lymph # 1.24 1.10 - 3.70 k/uL    Absolute Mono # 0.87 0.10 - 1.20 k/uL    Absolute Eos # <0.03 0.00 - 0.44 k/uL    Basophils # 0.03 0.00 - 0.20 k/uL    Absolute Immature Granulocyte 0.12 0.00 - 0.30 k/uL    WBC Morphology NOT REPORTED     RBC Morphology NOT REPORTED     Platelet Estimate NOT REPORTED     Protime 11.3 9.0 - 12.0 sec    INR 1.1     PTT 22.2 20.5 - 30.5 sec    Myoglobin 245 (H) 25 - 58 ng/mL    Troponin T <0.03 <0.03 ng/mL    Troponin Interp          Glucose 296 (H) 70 - 99 mg/dL    BUN 11 6 - 20 mg/dL    CREATININE 0.65 0.50 - 0.90 mg/dL    Bun/Cre Ratio NOT REPORTED 9 - 20    Calcium 9.3 8.6 - 10.4 mg/dL    Sodium 135 135 - 144 mmol/L    Potassium 3.5 (L) 3.7 - 5.3 mmol/L    Chloride 98 98 - 107 mmol/L    CO2 17 (L) 20 - 31 mmol/L    Anion Gap 20 (H) 9 - 17 mmol/L    GFR Non-African American >60 >60 mL/min    GFR African American >60 >60 mL/min    GFR Comment          GFR Staging NOT REPORTED     Total CK 98 26 - 192 U/L    CK-MB 3.1 <5.4 ng/mL    % CKMB 3.2 (H) 0.0 - 3.0 %    CKMB Interpretation NORMAL ISOENZYME PATTERN    Levetiracetam Level    Collection Time: 09/02/18  8:34 PM   Result Value Ref Range    Levetiracetam Lvl <2 ug/mL   Osmolality    Collection Time: 09/02/18  8:34 PM   Result Value Ref Range    Serum Osmolality 303 (H) 275 - 295 mOsm/kg   POCT troponin    Collection Time: 09/02/18 10:01 PM   Result Value Ref Range    POC Troponin I 0.05 0.00 - 0.10 ng/mL    POC Troponin Interp       The Troponin-I (POC) results cannot be compared to the Troponin-T results.    Urinalysis with microscopic    Collection Time: 09/02/18 10:57 PM   Result Value Ref Range    Color, UA YELLOW YEL    Turbidity UA CLEAR CLEAR    Glucose, Ur 1+ (A) NEG    Bilirubin Urine NEGATIVE NEG    Ketones, Urine NEGATIVE NEG    Specific Gravity, UA 1.016 1.005 - 1.030    Urine Hgb SMALL (A) NEG    pH, UA 6.5 5.0 - 8.0    Protein, UA NEGATIVE NEG    Urobilinogen, Urine Normal NORM    Nitrite, Urine NEGATIVE NEG    Leukocyte Esterase, Urine MODERATE (A) NEG    -          WBC, UA 20 TO 50 0 - 5 /HPF    RBC, UA 2 TO 5 0 - 4 /HPF    Casts UA 0 TO 2 HYALINE 0 - 8 /LPF    Crystals UA NOT REPORTED NONE /HPF    Epithelial Cells UA 2 TO 5 0 - 5 /HPF    Renal Epithelial, Urine NOT REPORTED 0 /HPF    Bacteria, UA NOT REPORTED NONE    Mucus, UA NOT REPORTED NONE    Trichomonas, UA NOT REPORTED NONE    Amorphous, UA NOT REPORTED NONE    Other Observations UA NOT REPORTED NREQ    Yeast, UA NOT REPORTED NONE   Urine Culture    Collection Time: 09/02/18 10:57 PM   Result Value Ref Range    Specimen Description . CLEAN CATCH URINE     Special Requests NOT REPORTED     Culture CULTURE IN PROGRESS     Status Pending    Culture Blood #1    Collection Time: 09/02/18 11:00 PM   Result Value Ref Range    Specimen Description . BLOOD     Special Requests RT EXTERNAL JUGULAR 10 ML     Culture NO GROWTH 14 HOURS     Status Pending    EKG 12 lead    Collection Time: 09/02/18 11:04 PM   Result Value Ref Range    Ventricular Rate 111 BPM    Atrial Rate 111 BPM    P-R Interval 162 ms    QRS Duration 94 ms    Q-T Interval 354 ms    QTc Calculation (Bazett) 481 ms    P Axis 85 degrees    R Axis 44 degrees    T Axis 99 degrees   Lactic Acid, Plasma    Collection Time: 09/02/18 11:14 PM   Result Value Ref Range    Lactic Acid NOT REPORTED mmol/L    Lactic Acid, Whole Blood 4.5 (H) 0.7 - 2.1 mmol/L   HEPATIC FUNCTION PANEL    Collection Time: 09/02/18 11:14 PM   Result Value Ref Range    Alb 3.9 3.5 - 5.2 g/dL    Alkaline Phosphatase 101 35 - 104 U/L    ALT 6 5 - 33 U/L    AST 14 <32 U/L    Total Bilirubin 0.69 0.3 - 1.2 mg/dL    Bilirubin, Direct 0.21 <0.31 mg/dL    Bilirubin, Indirect 0.48 0.00 - 1.00 mg/dL    Total Protein 7.6 6.4 - 8.3 g/dL    Globulin NOT REPORTED 1.5 - 3.8 g/dL    Albumin/Globulin Ratio 1.1 1.0 - 2.5   Hepatitis Panel, Acute    Collection Time: 09/02/18 11:14 PM   Result Value Ref Range    Hepatitis B Surface Ag NONREACTIVE NR    Hepatitis C Ab REACTIVE (A) NR    Hep B Core Ab, IgM NONREACTIVE NR    Hep A IgM (A) NR     Sent to reference laboratory. Separate report to follow. Brain Natriuretic Peptide    Collection Time: 09/02/18 11:14 PM   Result Value Ref Range    Pro-BNP 4,695 (H) <300 pg/mL    BNP Interpretation         Arterial Blood Gas, POC    Collection Time: 09/02/18 11:47 PM   Result Value Ref Range    POC pH 7.419 7.350 - 7.450    POC pCO2 32.2 (L) 35.0 - 48.0 mm Hg    POC PO2 106.8 83.0 - 108.0 mm Hg    POC HCO3 20.8 (L) 21.0 - 28.0 mmol/L    TCO2 (calc), Art 22 22.0 - 29.0 mmol/L    Negative Base Excess, Art 3 (H) 0.0 - 2.0    Positive Base Excess, Art NOT REPORTED 0.0 - 3.0    POC O2 SAT 98 94.0 - 98.0 %    O2 Device/Flow/% Adult Ventilator     Edilberto Test POSITIVE     Sample Site Right Radial Artery     Mode PRVC     FIO2 30.0     Pt Temp NOT REPORTED     POC pH Temp NOT REPORTED     POC pCO2 Temp NOT REPORTED mm Hg    POC pO2 Temp NOT REPORTED mm Hg   MRSA DNA Probe, Nasal    Collection Time: 09/03/18  1:03 AM   Result Value Ref Range    Specimen Description . NASAL SWAB     MRSA, DNA, Nasal (A) NMRSAA     POSITIVE:  MRSA DNA detected by nucleic acid amplification.    POC Glucose Fingerstick    Collection Time: 09/03/18  1:04 AM   Result Value Ref Range    POC Glucose 237 (H) 65 - 105 mg/dL   Sodium Lab    Collection Time: 09/03/18  2:27 AM   Result Value Ref Range    Sodium 138 135 - 144 mmol/L   Arterial Blood Gas, POC    Collection Time: 09/03/18  3:43 AM   Result Value Ref Range    POC pH 7.475 (H) 7.350 - 7.450    POC pCO2 32.3 (L) 35.0 - 48.0 mm Hg    POC PO2 94.6 83.0 - 108.0 mm Hg    POC HCO3 23.7 21.0 - 28.0 mmol/L    TCO2 (calc), Art 25 22.0 - 29.0 mmol/L    Negative Base Excess, Art NOT REPORTED 0.0 - 2.0    Positive Base Excess, Art 1 0.0 - 3.0    POC O2 SAT 98 94.0 - 98.0 %    O2 Device/Flow/% Adult Ventilator     [facility] Final Result   1. Endotracheal tube with distal tip approximately 7.5 cm above the lucio. Enteric tube in appropriate position. 2. No evidence of acute intrathoracic process. 3. COPD. CTA NECK W CONTRAST   Final Result   Occlusion at the proximal aspect of the M1 segment of the right MCA, likely   contributing to known acute large right MCA territory infarction. Thrombus at the proximal aspect of the right subclavian artery, contributing   to near occlusion. Hypoplastic right vertebral artery, with minimal contrast opacification in   the V2 segment and nonvisualized V1 segment, stable since July 16, 2015.      50% focal stenosis in the left cavernous internal carotid artery secondary to   atherosclerotic calcification. The bilateral common and cervical internal carotid arteries are patent   without focal stenosis. The results were reported to Dr. Cortez Green at 9:54 p.m. on September 2, 2018. CTA HEAD W CONTRAST   Final Result   Occlusion at the proximal aspect of the M1 segment of the right MCA, likely   contributing to known acute large right MCA territory infarction. Thrombus at the proximal aspect of the right subclavian artery, contributing   to near occlusion. Hypoplastic right vertebral artery, with minimal contrast opacification in   the V2 segment and nonvisualized V1 segment, stable since July 16, 2015.      50% focal stenosis in the left cavernous internal carotid artery secondary to   atherosclerotic calcification. The bilateral common and cervical internal carotid arteries are patent   without focal stenosis. The results were reported to Dr. Cortez Green at 9:54 p.m. on September 2, 2018. CT Head WO Contrast   Final Result   Acute large right MCA territory infarction, involving the right frontal,   temporal, parietal lobes and the right basal ganglia/insular cortex. Associated mass effect and 4 mm right to left midline shift.       No acute

## 2018-09-03 NOTE — PROGRESS NOTES
09/03/18 0527   Patient Transport   Time spent transporting 46-60   Transport ventillation type Transport vent   Transport from ICU   Transport destination MRI   Transport destination ICU   Emergency equipment included Yes

## 2018-09-03 NOTE — PROGRESS NOTES
09/02/18 1950   Vent Information   Ventilator Started Yes   Vent Type Other(comment)  (Serrano T-1)   Vent Mode APV  (CMV)   Vt Ordered 400 mL   Rate Set 18 bmp   FiO2  100 %   Sensitivity 5   PEEP/CPAP 5     Patient was intubated and placed on the above charted vent settings

## 2018-09-03 NOTE — PLAN OF CARE
Problem: Restraint Use - Violent/Self-Destructive Behavior:  Goal: Absence of restraint indications  Absence of restraint indications   Outcome: Met This Shift  Patient assessment complete, explained to patient need for restraint and discontinuation criteria, ROM completed, discussed with doctor need for order and continuation of restraint, skin assessment completed.

## 2018-09-03 NOTE — FLOWSHEET NOTE
No family had called inquiring about the pt, and no family had shown up at the hospital. Pt has listened Danaetyrone Maxwell and Charis Ayala as her emergency contacts, but the phone numbers did not work.  called [employer] who was going to send someone over to pt's address.  also called security to see if they could get a license plate number to identify the people who dropped pt off. Per nurse \"I helped get the patient out of the truck, and once we got her out they left. The people in the truck told us she had been doing drugs all day, heroine and other things. \" Previous shift  had attempted to contact family with no success.

## 2018-09-03 NOTE — CONSULTS
Department of Endovascular Neurosurgery                                         Resident Consult Note    Reason for Consult:  Unresponsive, right gaze deviation  Requesting Physician:  ER  Endovascular Neurosurgeon:   []Dr. Tosha Sorto  [x]Dr. Satnam Gilmore  []Dr. Cleo Lin  []Dr. Aleksandar Mcelroy  []     History Obtained From:  electronic medical record    CHIEF COMPLAINT:       Found unresponsive in Car    HISTORY OF PRESENT ILLNESS:       The patient is a 56 y.o. female with PMH of Previous Left CVA (7/16/15) and drug abuse who presents unresponsive and found in Car by hospital staff. No Known LWK. Nursing staff refer they got history of patient having multiple drug intake the night before. Patient was intubated and femoral line placed. They noticed she had right gaze forced deviation and order stroke alert. (9:20pm)On new information partner brought her in due to doing heroin last night and he thinks she OD. He notice she was place and was breathing differently. Will try  for more information     MAURY Gonzales 115: Unknown (someplace last night)  NIHSS: 28  PAST MEDICAL HISTORY :       Past Medical History:        Diagnosis Date    CAD (coronary artery disease)     CHF [facility]congestive heart failure) ([facility] [address]     Hypertension     Unspecified cerebral artery occlusion with cerebral infarction     Urinary incontinence, urge 10/16/2017       Past Surgical History:    No past surgical history on file. Social History:   Social History     Social History    Marital status:      Spouse name: N/A    Number of children: N/A    Years of education: N/A     Occupational History    Not on file.      Social History Main Topics    Smoking status: Current Every Day Smoker     Packs/day: 2.00     Types: Cigarettes    Smokeless tobacco: Never Used    Alcohol use No    Drug use: Yes     Types: Marijuana, IV      Comment: daily heroin    Sexual activity: Not on file     Other Topics Concern    Not on file Social History Narrative    No narrative on file       Family History:   No family history on file. Allergies:  Patient has no known allergies. Home Medications:  Prior to Admission medications    Medication Sig Start Date End Date Taking?  Authorizing Provider   metoprolol succinate (TOPROL XL) 50 MG extended release tablet take 1 tablet by mouth once daily 6/22/18   Leland Thomson MD   metoprolol succinate (TOPROL XL) 50 MG extended release tablet Take 1 tablet by mouth daily 2/13/18   Leland Thomson MD   aspirin 81 MG chewable tablet Take 1 tablet by mouth daily 11/8/17   Leland Thomson MD   acetaminophen-codeine (TYLENOL #3) 300-30 MG per tablet Take 1 tablet by mouth every 6 hours as needed for Pain 10/25/17   Leland Thomson MD   gabapentin (NEURONTIN) 300 MG capsule Take 1 capsule by mouth 3 times daily 10/16/17   Leland Thomson MD   Incontinence Supply Disposable (INCONTINENCE BRIEF MEDIUM) MISC Use daily 10/16/17   Leland Thomson MD   metoprolol succinate (TOPROL XL) 25 MG extended release tablet Take 0.5 tablets by mouth daily 9/15/16   Leland Thomson MD   docusate (COLACE, DULCOLAX) 100 MG CAPS Take 100 mg by mouth 2 times daily as needed (constipation) 9/15/16   Leland Thomson MD   levETIRAcetam (KEPPRA) 500 MG tablet Take 1 tablet by mouth 2 times daily 9/15/16   Leland Thomson MD   famotidine (PEPCID) 20 MG tablet Take 1 tablet by mouth daily 9/15/16   Leland Thomson MD   atorvastatin (LIPITOR) 80 MG tablet Take 0.5 tablets by mouth nightly 9/15/16   Leland Thomson MD   metFORMIN (GLUCOPHAGE) 500 MG tablet Take 1 tablet by mouth daily (with breakfast) 9/15/16   Leland Thomson MD   oxybutynin (DITROPAN-XL) 5 MG CR tablet Take 1 tablet by mouth nightly 7/31/15   Sunil Santizo MD       Current Medications:   Current Facility-Administered Medications: etomidate (AMIDATE) injection 20 mg, 20 mg, Intravenous, Once  succinylcholine (ANECTINE) injection 100 mg, 100 mg, Intravenous, Once    REVIEW OF SYSTEMS:       CONSTITUTIONAL: negative for fatigue and malaise   EYES: negative for double vision and photophobia    HEENT: negative for tinnitus and sore throat   RESPIRATORY: negative for cough, shortness of breath   CARDIOVASCULAR: negative for chest pain, palpitations   GASTROINTESTINAL: negative for nausea, vomiting   GENITOURINARY: negative for incontinence   MUSCULOSKELETAL: negative for neck or back pain   NEUROLOGICAL: negative for seizures   PSYCHIATRIC: negative for fatigue     Review of systems otherwise negative. PHYSICAL EXAM:       BP (!) 184/104   Pulse 117   Temp 97.5 °F (36.4 °C) (Oral)   Resp 30   Wt 90 lb (40.8 kg)   LMP  (LMP Unknown)   SpO2 100%   BMI 16.46 kg/m²     CONSTITUTIONAL:  GCS 3/15, intubated not responding   HEAD:  normocephalic,   EYES:  Forced gaze deviation to right   ENT:  moist mucous membranes   NECK:  supple, symmetric, no midline tenderness to palpation    BACK:  without midline tenderness, step-offs or deformities    LUNGS:  Equal air entry bilaterally   CARDIOVASCULAR:  normal s1 / s2   ABDOMEN:  Soft, no rigidity   NEUROLOGIC:  Mental Status:  Not oriented to date, Not oriented to person and Not oriented to place,comatose, intubated GCS 3/15             Cranial Nerves:    Pupillary reflex:  Corneal Reflex:  Gag reflex:   Oculocephalic Reflex: Motor Exam:    No response    Sensory:    Pain: No response to pain    Deep Tendon Reflexes:    Right Bicep:  1+  Left Bicep:  1+  Right Knee:  1+  Left Knee:  1+    Plantar Response:  Right:  downgoing  Left:  downgoing    Clonus:  absent  Chávez's:  N/A    Coordination/Dysmetria:      Unable    Gait:  Intubated    INITIAL NIH STROKE SCALE:    Time Performed:  8:20 PM     1a. Level of consciousness:  3 - responds only with reflex motor or automatic effects or totally unresponsive, flaccid, areflexic  1b. Level of consciousness questions:  2 - answers neither question correctly  1c. Level of consciousness questions:  2 - performs neither task correctly  2. Best Gaze:  2 - forced deviation, or total gaze paresis not overcome by oculocephalic maneuver  3. Visual:  0 - no visual loss  4. Facial Palsy:  0 - normal symmetric movement  5a. Motor left arm:  4 - no movement  5b. Motor right arm:  4 - no movement  6a. Motor left le - no movement  6b. Motor right le - no movement  7. Limb Ataxia:  0 - absent  8. Sensory:  0 - normal; no sensory loss  9. Best Language:  3 - mute, global aphasia; no usable speech or auditory comprehension  10. Dysarthria:  UN - intubated or other physical barrier  11.   Extinction and Inattention:  0 - no abnormality    TOTAL:  28     SKIN:  no rash      LABS AND IMAGING:     CBC with Differential:    Lab Results   Component Value Date    WBC 10.6 2016    RBC 4.46 2016    RBC 3.25 10/15/2011    HGB 13.6 2016    HCT 41.7 2016     2016     10/15/2011    MCV 93.5 2016    MCH 30.5 2016    MCHC 32.7 2016    RDW 15.6 2016    LYMPHOPCT 36 2016    MONOPCT 8 2016    BASOPCT 1 2016    MONOSABS 0.90 2016    LYMPHSABS 3.80 2016    EOSABS 0.20 2016    BASOSABS 0.10 2016    DIFFTYPE NOT REPORTED 2016     BMP:    Lab Results   Component Value Date     2016    K 4.8 2016    CL 97 2016    CO2 23 2016    BUN 12 2016    LABALBU 4.7 2016    CREATININE 0.63 2018    CREATININE 0.55 2016    CALCIUM 9.8 2016    GFRAA >60 2016    LABGLOM >60 2018    GLUCOSE 89 2016    GLUCOSE 122 10/13/2011       Radiology Review:       Reviewed at 8:55PM    Narrative   EXAMINATION:   CT OF THE HEAD WITHOUT CONTRAST  2018 8:55 pm       TECHNIQUE:   CT of the head was performed without the administration of intravenous   contrast. Dose modulation, iterative reconstruction, and/or weight to Dr. Deveron Closs at 9:09 p.m. on September 2, 2018.         2.) CTA Head and Neck         Reviewed at 8:57PM -> Right MA occlusion    ASSESSMENT AND PLAN:       Patient Active Problem List   Diagnosis    Cerebral infarction ([facility] [address]    Somnolence    CHF (congestive heart failure) ([facility] [address]    Heroin overdose    Lactic acidosis    Hyperglycemia    Acute respiratory failure ([facility] [address]    H/O essential hypertension    Smoker    Seizure-like activity ([facility] [address]    Bilious vomiting with nausea    Urinary incontinence, urge       56 y.o. female who presents with unresponsiveness, intubated and right gaze forced deviation. Ct Scan revealed old left MCA stroke and new Acute Right Large stroke. MCA territory with poor prognosis    //Stroke vs Drug Overdose vs Seizures///    - Discussed with Dr. Deveron Closs  - Leana Corona- Stat    If Imaging negative then:   - Keppra 1,500mg IV Loading then Keppra 750mg IV q 12 Hrs   - MRI Brain WO - When stable   - ECHO   - Aspirin 81mg PO D   - Hydrate   - Telemetry    - Neuro checks per protocol  - We recommend -160  - Neurosurgery consult  - Blood glucose goal less than 180  - Please avoid dextrose containing solutions  - Urgent Family Meeting-Very Poor Prognosis    Additional recommendations may follow    Please contact [facility] with any changes in patients neurologic status. Thank you for your consult.        Heavenly Dixon MD  Neurology Resident PGY-2  9/2/2018 at 8:37 PM

## 2018-09-04 ENCOUNTER — APPOINTMENT (OUTPATIENT)
Dept: GENERAL RADIOLOGY | Age: 58
DRG: 816 | End: 2018-09-04
Payer: COMMERCIAL

## 2018-09-04 LAB
ABSOLUTE EOS #: 0 K/UL (ref 0–0.4)
ABSOLUTE IMMATURE GRANULOCYTE: 0 K/UL (ref 0–0.3)
ABSOLUTE LYMPH #: 1.16 K/UL (ref 1–4.8)
ABSOLUTE MONO #: 0.7 K/UL (ref 0.1–0.8)
ALLEN TEST: POSITIVE
ANION GAP SERPL CALCULATED.3IONS-SCNC: 19 MMOL/L (ref 9–17)
BASOPHILS # BLD: 1 % (ref 0–2)
BASOPHILS ABSOLUTE: 0.12 K/UL (ref 0–0.2)
BUN BLDV-MCNC: 2 MG/DL (ref 6–20)
BUN/CREAT BLD: ABNORMAL (ref 9–20)
CALCIUM IONIZED: 1.25 MMOL/L (ref 1.13–1.33)
CALCIUM SERPL-MCNC: 8.3 MG/DL (ref 8.6–10.4)
CHLORIDE BLD-SCNC: 111 MMOL/L (ref 98–107)
CO2: 18 MMOL/L (ref 20–31)
CREAT SERPL-MCNC: 0.44 MG/DL (ref 0.5–0.9)
DIFFERENTIAL TYPE: ABNORMAL
EOSINOPHILS RELATIVE PERCENT: 0 % (ref 1–4)
FIO2: 30
GFR AFRICAN AMERICAN: >60 ML/MIN
GFR NON-AFRICAN AMERICAN: >60 ML/MIN
GFR SERPL CREATININE-BSD FRML MDRD: ABNORMAL ML/MIN/{1.73_M2}
GFR SERPL CREATININE-BSD FRML MDRD: ABNORMAL ML/MIN/{1.73_M2}
GLUCOSE BLD-MCNC: 105 MG/DL (ref 65–105)
GLUCOSE BLD-MCNC: 121 MG/DL (ref 65–105)
GLUCOSE BLD-MCNC: 132 MG/DL (ref 65–105)
GLUCOSE BLD-MCNC: 152 MG/DL (ref 74–100)
GLUCOSE BLD-MCNC: 173 MG/DL (ref 70–99)
GLUCOSE BLD-MCNC: 97 MG/DL (ref 65–105)
HCT VFR BLD CALC: 35.5 % (ref 36.3–47.1)
HEMOGLOBIN: 12 G/DL (ref 11.9–15.1)
IMMATURE GRANULOCYTES: 0 %
LACTIC ACID, WHOLE BLOOD: 3.1 MMOL/L (ref 0.7–2.1)
LV EF: 30 %
LVEF MODALITY: NORMAL
LYMPHOCYTES # BLD: 10 % (ref 24–44)
MAGNESIUM: 1.5 MG/DL (ref 1.6–2.6)
MCH RBC QN AUTO: 31.6 PG (ref 25.2–33.5)
MCHC RBC AUTO-ENTMCNC: 33.8 G/DL (ref 28.4–34.8)
MCV RBC AUTO: 93.4 FL (ref 82.6–102.9)
MODE: ABNORMAL
MONOCYTES # BLD: 6 % (ref 1–7)
MORPHOLOGY: NORMAL
NEGATIVE BASE EXCESS, ART: ABNORMAL (ref 0–2)
NRBC AUTOMATED: 0 PER 100 WBC
O2 DEVICE/FLOW/%: ABNORMAL
PATIENT TEMP: 37.6
PDW BLD-RTO: 14 % (ref 11.8–14.4)
PHOSPHORUS: 1.2 MG/DL (ref 2.6–4.5)
PLATELET # BLD: ABNORMAL K/UL (ref 138–453)
PLATELET ESTIMATE: ABNORMAL
PLATELET, FLUORESCENCE: 184 K/UL (ref 138–453)
PLATELET, IMMATURE FRACTION: 2.4 % (ref 1.1–10.3)
PMV BLD AUTO: ABNORMAL FL (ref 8.1–13.5)
POC HCO3: 22.1 MMOL/L (ref 21–28)
POC O2 SATURATION: 99 % (ref 94–98)
POC PCO2 TEMP: 30 MM HG
POC PCO2: 29.3 MM HG (ref 35–48)
POC PH TEMP: 7.48
POC PH: 7.49 (ref 7.35–7.45)
POC PO2 TEMP: 133 MM HG
POC PO2: 129.4 MM HG (ref 83–108)
POSITIVE BASE EXCESS, ART: 0 (ref 0–3)
POTASSIUM SERPL-SCNC: 3.1 MMOL/L (ref 3.7–5.3)
RBC # BLD: 3.8 M/UL (ref 3.95–5.11)
RBC # BLD: ABNORMAL 10*6/UL
SAMPLE SITE: ABNORMAL
SEG NEUTROPHILS: 83 % (ref 36–66)
SEGMENTED NEUTROPHILS ABSOLUTE COUNT: 9.62 K/UL (ref 1.8–7.7)
SERUM OSMOLALITY: 292 MOSM/KG (ref 275–295)
SERUM OSMOLALITY: 297 MOSM/KG (ref 275–295)
SODIUM BLD-SCNC: 145 MMOL/L (ref 135–144)
SODIUM BLD-SCNC: 146 MMOL/L (ref 135–144)
SODIUM BLD-SCNC: 148 MMOL/L (ref 135–144)
TCO2 (CALC), ART: 23 MMOL/L (ref 22–29)
WBC # BLD: 11.6 K/UL (ref 3.5–11.3)
WBC # BLD: ABNORMAL 10*3/UL

## 2018-09-04 PROCEDURE — 82330 ASSAY OF CALCIUM: CPT

## 2018-09-04 PROCEDURE — 2000000003 HC NEURO ICU R&B

## 2018-09-04 PROCEDURE — 82803 BLOOD GASES ANY COMBINATION: CPT

## 2018-09-04 PROCEDURE — 2580000003 HC RX 258: Performed by: EMERGENCY MEDICINE

## 2018-09-04 PROCEDURE — 85025 COMPLETE CBC W/AUTO DIFF WBC: CPT

## 2018-09-04 PROCEDURE — 71045 X-RAY EXAM CHEST 1 VIEW: CPT

## 2018-09-04 PROCEDURE — 84100 ASSAY OF PHOSPHORUS: CPT

## 2018-09-04 PROCEDURE — 82947 ASSAY GLUCOSE BLOOD QUANT: CPT

## 2018-09-04 PROCEDURE — 36415 COLL VENOUS BLD VENIPUNCTURE: CPT

## 2018-09-04 PROCEDURE — 6370000000 HC RX 637 (ALT 250 FOR IP): Performed by: EMERGENCY MEDICINE

## 2018-09-04 PROCEDURE — 6360000002 HC RX W HCPCS: Performed by: STUDENT IN AN ORGANIZED HEALTH CARE EDUCATION/TRAINING PROGRAM

## 2018-09-04 PROCEDURE — 36600 WITHDRAWAL OF ARTERIAL BLOOD: CPT

## 2018-09-04 PROCEDURE — 6360000002 HC RX W HCPCS: Performed by: EMERGENCY MEDICINE

## 2018-09-04 PROCEDURE — 94003 VENT MGMT INPAT SUBQ DAY: CPT

## 2018-09-04 PROCEDURE — 83605 ASSAY OF LACTIC ACID: CPT

## 2018-09-04 PROCEDURE — 94770 HC ETCO2 MONITOR DAILY: CPT

## 2018-09-04 PROCEDURE — 85055 RETICULATED PLATELET ASSAY: CPT

## 2018-09-04 PROCEDURE — 84295 ASSAY OF SERUM SODIUM: CPT

## 2018-09-04 PROCEDURE — 6360000002 HC RX W HCPCS: Performed by: NURSE PRACTITIONER

## 2018-09-04 PROCEDURE — 83930 ASSAY OF BLOOD OSMOLALITY: CPT

## 2018-09-04 PROCEDURE — 80048 BASIC METABOLIC PNL TOTAL CA: CPT

## 2018-09-04 PROCEDURE — 95951 HC EEG MONITORING VIDEO RECORDING: CPT

## 2018-09-04 PROCEDURE — 93306 TTE W/DOPPLER COMPLETE: CPT

## 2018-09-04 PROCEDURE — 4A10X4Z MONITORING OF CENTRAL NERVOUS ELECTRICAL ACTIVITY, EXTERNAL APPROACH: ICD-10-PCS | Performed by: PSYCHIATRY & NEUROLOGY

## 2018-09-04 PROCEDURE — 94762 N-INVAS EAR/PLS OXIMTRY CONT: CPT

## 2018-09-04 PROCEDURE — 95951 PR EEG MONITORING/VIDEORECORD: CPT | Performed by: PSYCHIATRY & NEUROLOGY

## 2018-09-04 PROCEDURE — 99291 CRITICAL CARE FIRST HOUR: CPT | Performed by: PSYCHIATRY & NEUROLOGY

## 2018-09-04 PROCEDURE — 2580000003 HC RX 258

## 2018-09-04 PROCEDURE — 6370000000 HC RX 637 (ALT 250 FOR IP): Performed by: STUDENT IN AN ORGANIZED HEALTH CARE EDUCATION/TRAINING PROGRAM

## 2018-09-04 PROCEDURE — 83735 ASSAY OF MAGNESIUM: CPT

## 2018-09-04 PROCEDURE — 6370000000 HC RX 637 (ALT 250 FOR IP): Performed by: FAMILY MEDICINE

## 2018-09-04 RX ORDER — LORAZEPAM 1 MG/1
4 TABLET ORAL
Status: DISCONTINUED | OUTPATIENT
Start: 2018-09-04 | End: 2018-09-06

## 2018-09-04 RX ORDER — LEVETIRACETAM 10 MG/ML
1000 INJECTION INTRAVASCULAR EVERY 12 HOURS
Status: DISCONTINUED | OUTPATIENT
Start: 2018-09-05 | End: 2018-09-07

## 2018-09-04 RX ORDER — LORAZEPAM 1 MG/1
2 TABLET ORAL
Status: DISCONTINUED | OUTPATIENT
Start: 2018-09-04 | End: 2018-09-06

## 2018-09-04 RX ORDER — FAMOTIDINE 20 MG/1
20 TABLET, FILM COATED ORAL 2 TIMES DAILY
Status: DISCONTINUED | OUTPATIENT
Start: 2018-09-04 | End: 2018-09-07

## 2018-09-04 RX ORDER — LORAZEPAM 1 MG/1
1 TABLET ORAL
Status: DISCONTINUED | OUTPATIENT
Start: 2018-09-04 | End: 2018-09-06

## 2018-09-04 RX ORDER — LORAZEPAM 2 MG/ML
4 INJECTION INTRAMUSCULAR
Status: DISCONTINUED | OUTPATIENT
Start: 2018-09-04 | End: 2018-09-06

## 2018-09-04 RX ORDER — LORAZEPAM 2 MG/ML
3 INJECTION INTRAMUSCULAR
Status: DISCONTINUED | OUTPATIENT
Start: 2018-09-04 | End: 2018-09-06

## 2018-09-04 RX ORDER — LORAZEPAM 2 MG/ML
2 INJECTION INTRAMUSCULAR
Status: DISCONTINUED | OUTPATIENT
Start: 2018-09-04 | End: 2018-09-06

## 2018-09-04 RX ORDER — PROPRANOLOL HYDROCHLORIDE 40 MG/1
40 TABLET ORAL 2 TIMES DAILY
Status: DISCONTINUED | OUTPATIENT
Start: 2018-09-04 | End: 2018-09-07

## 2018-09-04 RX ORDER — SODIUM CHLORIDE 0.9 % (FLUSH) 0.9 %
10 SYRINGE (ML) INJECTION EVERY 12 HOURS SCHEDULED
Status: DISCONTINUED | OUTPATIENT
Start: 2018-09-04 | End: 2018-09-06 | Stop reason: SDUPTHER

## 2018-09-04 RX ORDER — POTASSIUM CHLORIDE 29.8 MG/ML
20 INJECTION INTRAVENOUS PRN
Status: DISCONTINUED | OUTPATIENT
Start: 2018-09-04 | End: 2018-09-08

## 2018-09-04 RX ORDER — SODIUM CHLORIDE 0.9 % (FLUSH) 0.9 %
10 SYRINGE (ML) INJECTION PRN
Status: DISCONTINUED | OUTPATIENT
Start: 2018-09-04 | End: 2018-09-06 | Stop reason: SDUPTHER

## 2018-09-04 RX ORDER — LORAZEPAM 2 MG/ML
2 INJECTION INTRAMUSCULAR ONCE
Status: COMPLETED | OUTPATIENT
Start: 2018-09-04 | End: 2018-09-04

## 2018-09-04 RX ORDER — FENTANYL CITRATE 50 UG/ML
50 INJECTION, SOLUTION INTRAMUSCULAR; INTRAVENOUS ONCE
Status: COMPLETED | OUTPATIENT
Start: 2018-09-04 | End: 2018-09-04

## 2018-09-04 RX ORDER — 3% SODIUM CHLORIDE 3 G/100ML
50 INJECTION, SOLUTION INTRAVENOUS CONTINUOUS
Status: DISPENSED | OUTPATIENT
Start: 2018-09-04 | End: 2018-09-05

## 2018-09-04 RX ORDER — MAGNESIUM SULFATE 1 G/100ML
1 INJECTION INTRAVENOUS PRN
Status: DISCONTINUED | OUTPATIENT
Start: 2018-09-04 | End: 2018-09-08

## 2018-09-04 RX ORDER — LORAZEPAM 2 MG/ML
1 INJECTION INTRAMUSCULAR
Status: DISCONTINUED | OUTPATIENT
Start: 2018-09-04 | End: 2018-09-06

## 2018-09-04 RX ORDER — LORAZEPAM 1 MG/1
3 TABLET ORAL
Status: DISCONTINUED | OUTPATIENT
Start: 2018-09-04 | End: 2018-09-06

## 2018-09-04 RX ORDER — 3% SODIUM CHLORIDE 3 G/100ML
INJECTION, SOLUTION INTRAVENOUS
Status: COMPLETED
Start: 2018-09-04 | End: 2018-09-04

## 2018-09-04 RX ORDER — MAGNESIUM SULFATE 1 G/100ML
1 INJECTION INTRAVENOUS
Status: COMPLETED | OUTPATIENT
Start: 2018-09-04 | End: 2018-09-04

## 2018-09-04 RX ADMIN — FENTANYL CITRATE 50 MCG: 50 INJECTION INTRAMUSCULAR; INTRAVENOUS at 00:50

## 2018-09-04 RX ADMIN — POTASSIUM CHLORIDE 20 MEQ: 400 INJECTION, SOLUTION INTRAVENOUS at 17:38

## 2018-09-04 RX ADMIN — VANCOMYCIN HYDROCHLORIDE 750 MG: 10 INJECTION, POWDER, LYOPHILIZED, FOR SOLUTION INTRAVENOUS at 12:31

## 2018-09-04 RX ADMIN — CEFTRIAXONE SODIUM 1 G: 1 INJECTION, POWDER, FOR SOLUTION INTRAMUSCULAR; INTRAVENOUS at 13:34

## 2018-09-04 RX ADMIN — MAGNESIUM SULFATE IN DEXTROSE 1 G: 10 INJECTION, SOLUTION INTRAVENOUS at 09:45

## 2018-09-04 RX ADMIN — LORAZEPAM 2 MG: 2 INJECTION INTRAMUSCULAR; INTRAVENOUS at 03:19

## 2018-09-04 RX ADMIN — FAMOTIDINE 20 MG: 20 TABLET, FILM COATED ORAL at 08:40

## 2018-09-04 RX ADMIN — SODIUM CHLORIDE 50 ML/HR: 3 INJECTION, SOLUTION INTRAVENOUS at 05:24

## 2018-09-04 RX ADMIN — MAGNESIUM SULFATE IN DEXTROSE 1 G: 10 INJECTION, SOLUTION INTRAVENOUS at 08:18

## 2018-09-04 RX ADMIN — FAMOTIDINE 20 MG: 20 TABLET, FILM COATED ORAL at 20:08

## 2018-09-04 RX ADMIN — SODIUM CHLORIDE 50 ML/HR: 3 INJECTION, SOLUTION INTRAVENOUS at 14:50

## 2018-09-04 RX ADMIN — LORAZEPAM 2 MG: 2 INJECTION INTRAMUSCULAR; INTRAVENOUS at 03:26

## 2018-09-04 RX ADMIN — LEVETIRACETAM 500 MG: 5 INJECTION INTRAVENOUS at 12:31

## 2018-09-04 RX ADMIN — DESMOPRESSIN ACETATE 40 MG: 0.2 TABLET ORAL at 20:08

## 2018-09-04 RX ADMIN — POTASSIUM CHLORIDE 20 MEQ: 400 INJECTION, SOLUTION INTRAVENOUS at 19:12

## 2018-09-04 RX ADMIN — LORAZEPAM 4 MG: 2 INJECTION INTRAMUSCULAR; INTRAVENOUS at 11:10

## 2018-09-04 RX ADMIN — PROPRANOLOL HYDROCHLORIDE 40 MG: 40 TABLET ORAL at 20:08

## 2018-09-04 RX ADMIN — Medication 10 ML: at 08:43

## 2018-09-04 RX ADMIN — ENOXAPARIN SODIUM 40 MG: 40 INJECTION SUBCUTANEOUS at 08:36

## 2018-09-04 RX ADMIN — ASPIRIN 81 MG: 81 TABLET, CHEWABLE ORAL at 08:36

## 2018-09-04 RX ADMIN — METOPROLOL TARTRATE 25 MG: 25 TABLET ORAL at 08:36

## 2018-09-04 RX ADMIN — LORAZEPAM 4 MG: 2 INJECTION INTRAMUSCULAR; INTRAVENOUS at 08:40

## 2018-09-04 RX ADMIN — CEFTRIAXONE SODIUM 1 G: 1 INJECTION, POWDER, FOR SOLUTION INTRAMUSCULAR; INTRAVENOUS at 00:13

## 2018-09-04 RX ADMIN — LEVETIRACETAM 500 MG: 5 INJECTION INTRAVENOUS at 00:13

## 2018-09-04 RX ADMIN — VANCOMYCIN HYDROCHLORIDE 750 MG: 10 INJECTION, POWDER, LYOPHILIZED, FOR SOLUTION INTRAVENOUS at 00:13

## 2018-09-04 ASSESSMENT — PULMONARY FUNCTION TESTS
PIF_VALUE: 17
PIF_VALUE: 12
PIF_VALUE: 14
PIF_VALUE: 16
PIF_VALUE: 18
PIF_VALUE: 17

## 2018-09-04 NOTE — FLOWSHEET NOTE
responded to nurse Eileen Oates for assistance in finding the family for patient who was dropped off at the Ed last night.  called patient Lorelei daughter ([phone])  and brotherNavi/Jessie Ramirez ([phone]). but got voicemail.  left a message for patient's brother to call the trauma phone.

## 2018-09-04 NOTE — PROGRESS NOTES
Neuro critical care service:     Large hemispheric infarction   Respiratory failure   Seizures     Intubated, not sedated. 3% infusion continues for cerebral edema. Broad spectrum antibiotics continue. Lactic acidosis down trending. She is not hypotensive. cEEG has showed one episode of electrographic seizure as reported. One more witnessed / suspected GTC spell overnight. Increase keppra to 1 gram bid. Unchanged exam, obtunded with mild subtle RUE localization. She has suffered a large left cerebral infarction in past and this time has large hemispheric right sided infarction. She has poor neuro exam, currently intubated and non sedated. We will get palliative care at this time for  for family support and POA determination.      Jeronimo Cortez MD

## 2018-09-04 NOTE — PROGRESS NOTES
[facility]  Occupational Therapy Not Seen Note    DATE: 2018  Name: Zahra Brown  : 1960  MRN: [id number]    Patient not available for Occupational Therapy due to:    [] Testing:    [] Hemodialysis    [] Blood Transfusion in Progress    []Refusal by Patient:    [] Surgery/Procedure:    [x] Strict Bedrest    [] Sedation    [] Spine Precautions     [] Pt being transferred to palliative care at this time. Spoke with pt/family and OT services to be defered. [] Pt independent with functional mobility and functional tasks. Pt with no OT acute care needs at this time, will defer OT eval.    [x] Other: Pt vented and actively seizing at this time, hold OT eval.    Next Scheduled Treatment: Attempt on  as appropriate. Signature:  TAWANDA Nye/ALEXI

## 2018-09-04 NOTE — PROGRESS NOTES
Physical Therapy  DATE: 2018    NAME: Corey Aquino  MRN: [id number]   : 1960    Patient not seen this date for Physical Therapy due to:  [] Blood transfusion in progress  [] Hemodialysis  []  Patient Declined  [] Spine Precautions   [x] Strict Bedrest  [] Surgery/ Procedure  [] Testing      [x] Other- pt intubated and actively seizing. PT will check back 18. [] PT being discontinued at this time. Patient independent. No further needs. [] PT being discontinued at this time as the patient has been transferred to palliative care. No further needs.     Wilhembee Koo, PT

## 2018-09-04 NOTE — PROCEDURES
[address] [place] [address] 30      CONTINUOUS VIDEO ELECTROENCEPHALOGRAM REPORT        REFERRING PHYSICIAN:  Eulalia Truong MD  PATIENT NAME:Michelle Dumont  PATIENT MRN: [id number]  DATE OF EE/3/2018 @ 16:56 to 2018 @ 9:56AM    BRIEF HISTORY:  56year old female with history of last left MCA stroke presented with AMS, was found to have new large right MCV ischemic infarct. EEG to evaluate. CURRENT ANTI-EPILEPTIC MEDICATIONS: Keppra 500mg BID; Ativan PRN    EEG DESCRIPTION:   During maximal wakefulness, the background was moderately to severely slow consisting of 2-6 Hz delta and theta activities ranging between 10-70 uV. No posterior dominant rhythm was at 5-6 on right but 2-3 on the left. There were very rare eye opening/closing artifacts in the recording. There was reactivity to stimulation and spontaneous variation. There was on EEG seizure from left hemisphere, not localizable, started with low amplitude, fast activity in beta frequency, followed with delta slow, and obscured with EMG artifacts. The seizure only lasted for 9-10 seconds, clinically patient suddenly had body right turned. There were few sharp waves in left frontal and right frontal/central during the first few hours of the recording on 9/3/2018. There was continuous slow in delta and theta frequency of 2-6Hz, generalized, at times, rhythmic in delta frequency of 2-4 Hz, lasted for 5-10 seconds. There was continuous slow, lateralized to right and left independently with suppression on the left parietal/occipital regions. No clear sleep patterns were recorded. Photic stimulation and hyperventilation were not  performed. Heart rate was regular at ~ beats per minute on a single lead EKG with rare arrhythmia. CLASSIFICATION:  Abnormal significance III (Coma)  1. Focal seizure, left hemisphere, not localizable  2.  Continuous slow, generalized and lateralized to left and right independently   3. Intermittent rhythmic slow, generalized  4. Asymmetric, attenuation on left parietal/occipital      IMPRESSION:  Recording from 9/3/2018 @ 16:56 to 9/4/2018 @ 9:56AM captured one brief EEG seizure arising from left hemisphere but not localizable, at 17:07 on 9/3/2018, which lasted less than 10 seconds. There were few sharp waves in the left frontal and right frontocentral regions in the first few hours of the recording on 9/3/2018. There was continuous slow, generalized and lateralized to left and right independently, indicated severe diffuse encephalopathy and severe bilateral structural abnormalities consistent with patient's history of bi-hemispheric large ischemic strokes.        Arthur West MD, [address] [place] [address], Neurology

## 2018-09-04 NOTE — PROGRESS NOTES
Pt HR in the 180's and appears to be foaming at the mouth and tremor ing. Dr. Mara Ruff notified and to bedside. Verbal orders received for 2 mg of Ativan IV push. Ativan given and patient still continuing to have tremor ing with thick secretions coming from mouth and HR in the 180's. Verbal order received from Dr. Mara Ruff for another 2 mg of Ativan IV push. Ativan given and pt HR in the high 90's.

## 2018-09-04 NOTE — PROGRESS NOTES
Resident Progress Note  Date: 2018  Time: 3:37 PM  Patient Name: Marly Gomes  Patient : 1960  Room/Bed: [facility]  Allergies: No Known Allergies    Interval History:  Had an episode of rhythmic shaking of the right lower extremity in addition to an elevation in her heart rate. Patient appeared comfortable on exam, was treated for seizure with Ativan. Patient has a history of substance abuse/dependence, CIWA initiated. Patient's boyfriend came to see her yesterday, not currently in the room. Unknown who is the patient's power of  at this time. No acute events overnight.      Current Medications:  Scheduled Meds:   sodium chloride flush  10 mL Intravenous 2 times per day    famotidine  20 mg Oral BID    [START ON 2018] cefTRIAXone (ROCEPHIN) IV  1 g Intravenous Q24H    [START ON 2018] levetiracetam  1,000 mg Intravenous Q12H    sodium chloride flush  10 mL Intravenous 2 times per day    enoxaparin  40 mg Subcutaneous Daily    atorvastatin  40 mg Per NG tube Nightly    aspirin  81 mg Per NG tube Daily    insulin lispro  0-6 Units Subcutaneous TID WC    insulin lispro  0-3 Units Subcutaneous Nightly    vancomycin (VANCOCIN) intermittent dosing (placeholder)   Other RX Placeholder    vancomycin  750 mg Intravenous Q12H    metoprolol tartrate  25 mg Oral BID    ketamine  0.25 mg/kg Intravenous Once       Continuous Infusions:   sodium chloride 50 mL/hr (18 1450)    propofol Stopped (18 0801)    sodium chloride 50 mL/hr at 18 1754    niCARdipine      norepinephrine Stopped (18 2349)       Vitals:  Patient Vitals for the past 8 hrs:   BP Temp Temp src Pulse Resp SpO2   18 1502 (!) 170/84 - - 94 17 100 %   18 1402 (!) 140/59 - - 62 16 100 %   18 1302 (!) 122/56 - - 77 16 100 %   18 1202 134/73 98.4 °F (36.9 °C) Oral 83 16 -   18 1116 - - - 90 16 100 %   18 1102 (!) 160/102 - - 97 17 100 %   18 1002 138/69 - - 76 16 100 %   09/04/18 0902 136/73 - - 103 16 100 %   09/04/18 0800 (!) 176/156 98.4 °F (36.9 °C) Oral 119 28 100 %     Height and Weight  Height: 5' (152.4 cm)  Weight: 96 lb 9 oz (43.8 kg)  Weight Method: Estimated  BSA (Calculated - sq m): 0.92 sq meters  BMI (Calculated): 18.9  Body mass index is 18.86 kg/m². <16 Severe malnutrition  16-16.99 Moderate malnutrition  17-18.49 Mild malnutrition  18.5-24.9 Normal  25-29.9 Overweight (not obese)  30-34.9 Obese class 1 (Low Risk)  35-39.9 Obese class 2 (Moderate Risk)  ? 40 Obese class 3 (High Risk)    Labs (last 48 hours):  Recent Results (from the past 48 hour(s))   Venous Blood Gas, POC    Collection Time: 09/02/18  8:31 PM   Result Value Ref Range    pH, Jose 7.318 (L) 7.320 - 7.430    pCO2, Jose 48.4 41.0 - 51.0 mm Hg    pO2, Jose 28.9 (L) 30.0 - 50.0 mm Hg    HCO3, Venous 24.8 22.0 - 29.0 mmol/L    Total CO2, Venous 26 23.0 - 30.0 mmol/L    Negative Base Excess, Jose 2 0.0 - 2.0    Positive Base Excess, Jose NOT REPORTED 0.0 - 3.0    O2 Sat, Jose 49 (L) 60.0 - 85.0 %    O2 Device/Flow/% NOT REPORTED     Edilberto Test NOT REPORTED     Sample Site NOT REPORTED     Mode NOT REPORTED     FIO2 NOT REPORTED     Pt Temp NOT REPORTED     POC pH Temp NOT REPORTED     POC pCO2 Temp NOT REPORTED mm Hg    POC pO2 Temp NOT REPORTED mm Hg   Hemoglobin and hematocrit, blood    Collection Time: 09/02/18  8:31 PM   Result Value Ref Range    POC Hemoglobin 17.6 (H) 12.0 - 16.0 g/dL    POC Hematocrit 52 (H) 36 - 46 %   Creatinine W/GFR Point of Care    Collection Time: 09/02/18  8:31 PM   Result Value Ref Range    POC Creatinine 0.63 0.51 - 1.19 mg/dL    GFR Comment >60 >60 mL/min    GFR Non-African American >60 >60 mL/min    GFR Comment         SODIUM (POC)    Collection Time: 09/02/18  8:31 PM   Result Value Ref Range    POC Sodium 140 138 - 146 mmol/L   POTASSIUM (POC)    Collection Time: 09/02/18  8:31 PM   Result Value Ref Range    POC Potassium 3.4 (L) 3.5 - 4.5 mmol/L CHLORIDE (POC)    Collection Time: 09/02/18  8:31 PM   Result Value Ref Range    POC Chloride 102 98 - 107 mmol/L   CALCIUM, IONIC (POC)    Collection Time: 09/02/18  8:31 PM   Result Value Ref Range    POC Ionized Calcium 1.22 1.15 - 1.33 mmol/L   Lactic Acid, POC    Collection Time: 09/02/18  8:31 PM   Result Value Ref Range    POC Lactic Acid 5.33 (H) 0.56 - 1.39 mmol/L   POCT Glucose    Collection Time: 09/02/18  8:31 PM   Result Value Ref Range    POC Glucose 317 (H) 74 - 100 mg/dL   Anion Gap (Calc) POC    Collection Time: 09/02/18  8:31 PM   Result Value Ref Range    Anion Gap 13 7 - 16 mmol/L   CBC auto differential    Collection Time: 09/02/18  8:34 PM   Result Value Ref Range    WBC 20.0 (H) 3.5 - 11.3 k/uL    RBC 4.70 3.95 - 5.11 m/uL    Hemoglobin 14.9 11.9 - 15.1 g/dL    Hematocrit 44.6 36.3 - 47.1 %    MCV 94.9 82.6 - 102.9 fL    MCH 31.7 25.2 - 33.5 pg    MCHC 33.4 28.4 - 34.8 g/dL    RDW 13.3 11.8 - 14.4 %    Platelets 357 973 - 459 k/uL    MPV 9.6 8.1 - 13.5 fL    NRBC Automated 0.0 0.0 per 100 WBC    Differential Type NOT REPORTED     Seg Neutrophils 89 (H) 36 - 65 %    Lymphocytes 6 (L) 24 - 43 %    Monocytes 5 3 - 12 %    Eosinophils % 0 (L) 1 - 4 %    Basophils 0 0 - 2 %    Immature Granulocytes 0 0 %    Segs Absolute 17.74 (H) 1.50 - 8.10 k/uL    Absolute Lymph # 1.24 1.10 - 3.70 k/uL    Absolute Mono # 0.92 0.10 - 1.20 k/uL    Absolute Eos # <0.03 0.00 - 0.44 k/uL    Basophils # 0.05 0.00 - 0.20 k/uL    Absolute Immature Granulocyte 0.08 0.00 - 0.30 k/uL    WBC Morphology NOT REPORTED     RBC Morphology NOT REPORTED     Platelet Estimate NOT REPORTED    APTT    Collection Time: 09/02/18  8:34 PM   Result Value Ref Range    PTT 21.4 20.5 - 30.5 sec   Protime-INR    Collection Time: 09/02/18  8:34 PM   Result Value Ref Range    Protime 11.2 9.0 - 12.0 sec    INR 1.1    STROKE PANEL    Collection Time: 09/02/18  8:34 PM   Result Value Ref Range    WBC 20.2 (H) 3.5 - 11.3 k/uL    RBC 4.75 3.95 - FUNCTION PANEL    Collection Time: 09/02/18 11:14 PM   Result Value Ref Range    Alb 3.9 3.5 - 5.2 g/dL    Alkaline Phosphatase 101 35 - 104 U/L    ALT 6 5 - 33 U/L    AST 14 <32 U/L    Total Bilirubin 0.69 0.3 - 1.2 mg/dL    Bilirubin, Direct 0.21 <0.31 mg/dL    Bilirubin, Indirect 0.48 0.00 - 1.00 mg/dL    Total Protein 7.6 6.4 - 8.3 g/dL    Globulin NOT REPORTED 1.5 - 3.8 g/dL    Albumin/Globulin Ratio 1.1 1.0 - 2.5   Hepatitis Panel, Acute    Collection Time: 09/02/18 11:14 PM   Result Value Ref Range    Hepatitis B Surface Ag NONREACTIVE NR    Hepatitis C Ab REACTIVE (A) NR    Hep B Core Ab, IgM NONREACTIVE NR    Hep A IgM (A) NR     Sent to reference laboratory. Separate report to follow. Brain Natriuretic Peptide    Collection Time: 09/02/18 11:14 PM   Result Value Ref Range    Pro-BNP 4,695 (H) <300 pg/mL    BNP Interpretation         Arterial Blood Gas, POC    Collection Time: 09/02/18 11:47 PM   Result Value Ref Range    POC pH 7.419 7.350 - 7.450    POC pCO2 32.2 (L) 35.0 - 48.0 mm Hg    POC PO2 106.8 83.0 - 108.0 mm Hg    POC HCO3 20.8 (L) 21.0 - 28.0 mmol/L    TCO2 (calc), Art 22 22.0 - 29.0 mmol/L    Negative Base Excess, Art 3 (H) 0.0 - 2.0    Positive Base Excess, Art NOT REPORTED 0.0 - 3.0    POC O2 SAT 98 94.0 - 98.0 %    O2 Device/Flow/% Adult Ventilator     Edilberto Test POSITIVE     Sample Site Right Radial Artery     Mode PRVC     FIO2 30.0     Pt Temp NOT REPORTED     POC pH Temp NOT REPORTED     POC pCO2 Temp NOT REPORTED mm Hg    POC pO2 Temp NOT REPORTED mm Hg   MRSA DNA Probe, Nasal    Collection Time: 09/03/18  1:03 AM   Result Value Ref Range    Specimen Description . NASAL SWAB     MRSA, DNA, Nasal (A) NMRSAA     POSITIVE:  MRSA DNA detected by nucleic acid amplification.    POC Glucose Fingerstick    Collection Time: 09/03/18  1:04 AM   Result Value Ref Range    POC Glucose 237 (H) 65 - 105 mg/dL   Sodium Lab    Collection Time: 09/03/18  2:27 AM   Result Value Ref Range    Sodium 138 135 - 144 mmol/L   Arterial Blood Gas, POC    Collection Time: 09/03/18  3:43 AM   Result Value Ref Range    POC pH 7.475 (H) 7.350 - 7.450    POC pCO2 32.3 (L) 35.0 - 48.0 mm Hg    POC PO2 94.6 83.0 - 108.0 mm Hg    POC HCO3 23.7 21.0 - 28.0 mmol/L    TCO2 (calc), Art 25 22.0 - 29.0 mmol/L    Negative Base Excess, Art NOT REPORTED 0.0 - 2.0    Positive Base Excess, Art 1 0.0 - 3.0    POC O2 SAT 98 94.0 - 98.0 %    O2 Device/Flow/% Adult Ventilator     Edilberto Test POSITIVE     Sample Site Left Radial Artery     Mode PRVC     FIO2 30.0     Pt Temp NOT REPORTED     POC pH Temp NOT REPORTED     POC pCO2 Temp NOT REPORTED mm Hg    POC pO2 Temp NOT REPORTED mm Hg   CBC Auto Differential    Collection Time: 09/03/18  6:29 AM   Result Value Ref Range    WBC 15.8 (H) 3.5 - 11.3 k/uL    RBC 4.59 3.95 - 5.11 m/uL    Hemoglobin 14.4 11.9 - 15.1 g/dL    Hematocrit 43.5 36.3 - 47.1 %    MCV 94.8 82.6 - 102.9 fL    MCH 31.4 25.2 - 33.5 pg    MCHC 33.1 28.4 - 34.8 g/dL    RDW 13.7 11.8 - 14.4 %    Platelets 447 680 - 054 k/uL    MPV 10.3 8.1 - 13.5 fL    NRBC Automated 0.0 0.0 per 100 WBC    Differential Type NOT REPORTED     WBC Morphology NOT REPORTED     RBC Morphology NOT REPORTED     Platelet Estimate NOT REPORTED     Seg Neutrophils 76 (H) 36 - 65 %    Lymphocytes 16 (L) 24 - 43 %    Monocytes 8 3 - 12 %    Eosinophils % 0 (L) 1 - 4 %    Basophils 0 0 - 2 %    Immature Granulocytes 1 (H) 0 %    Segs Absolute 12.01 (H) 1.50 - 8.10 k/uL    Absolute Lymph # 2.51 1.10 - 3.70 k/uL    Absolute Mono # 1.20 0.10 - 1.20 k/uL    Absolute Eos # <0.03 0.00 - 0.44 k/uL    Basophils # <0.03 0.00 - 0.20 k/uL    Absolute Immature Granulocyte 0.08 0.00 - 0.30 k/uL   Osmolality    Collection Time: 09/03/18  6:29 AM   Result Value Ref Range    Serum Osmolality 299 (H) 275 - 295 mOsm/kg   POC Glucose Fingerstick    Collection Time: 09/03/18 12:16 PM   Result Value Ref Range    POC Glucose 146 (H) 65 - 105 mg/dL   Lactic Acid, Whole Blood Collection Time: 09/03/18  3:20 PM   Result Value Ref Range    Lactic Acid, Whole Blood 3.6 (H) 0.7 - 2.1 mmol/L   CBC Auto Differential    Collection Time: 09/03/18  3:20 PM   Result Value Ref Range    WBC 13.8 (H) 3.5 - 11.3 k/uL    RBC 3.84 (L) 3.95 - 5.11 m/uL    Hemoglobin 12.1 11.9 - 15.1 g/dL    Hematocrit 35.7 (L) 36.3 - 47.1 %    MCV 93.0 82.6 - 102.9 fL    MCH 31.5 25.2 - 33.5 pg    MCHC 33.9 28.4 - 34.8 g/dL    RDW 14.0 11.8 - 14.4 %    Platelets See Reflexed IPF Result 138 - 453 k/uL    MPV NOT REPORTED 8.1 - 13.5 fL    NRBC Automated 0.0 0.0 per 100 WBC    Differential Type NOT REPORTED     WBC Morphology NOT REPORTED     RBC Morphology NOT REPORTED     Platelet Estimate NOT REPORTED     Seg Neutrophils 82 (H) 36 - 65 %    Lymphocytes 11 (L) 24 - 43 %    Monocytes 6 3 - 12 %    Eosinophils % 0 (L) 1 - 4 %    Basophils 0 0 - 2 %    Immature Granulocytes 0 0 %    Segs Absolute 11.36 (H) 1.50 - 8.10 k/uL    Absolute Lymph # 1.54 1.10 - 3.70 k/uL    Absolute Mono # 0.85 0.10 - 1.20 k/uL    Absolute Eos # <0.03 0.00 - 0.44 k/uL    Basophils # <0.03 0.00 - 0.20 k/uL    Absolute Immature Granulocyte 0.05 0.00 - 0.30 k/uL   Basic Metabolic Panel    Collection Time: 09/03/18  3:20 PM   Result Value Ref Range    Glucose 200 (H) 70 - 99 mg/dL    BUN 3 (L) 6 - 20 mg/dL    CREATININE 0.41 (L) 0.50 - 0.90 mg/dL    Bun/Cre Ratio NOT REPORTED 9 - 20    Calcium 7.9 (L) 8.6 - 10.4 mg/dL    Sodium 144 135 - 144 mmol/L    Potassium 3.5 (L) 3.7 - 5.3 mmol/L    Chloride 109 (H) 98 - 107 mmol/L    CO2 19 (L) 20 - 31 mmol/L    Anion Gap 16 9 - 17 mmol/L    GFR Non-African American >60 >60 mL/min    GFR African American >60 >60 mL/min    GFR Comment          GFR Staging NOT REPORTED    Magnesium    Collection Time: 09/03/18  3:20 PM   Result Value Ref Range    Magnesium 1.2 (L) 1.6 - 2.6 mg/dL   Calcium, Ionized    Collection Time: 09/03/18  3:20 PM   Result Value Ref Range    Calcium, Ion 1.03 (L) 1.13 - 1.33 mmol/L   Lipid Panel    Collection Time: 09/03/18  3:20 PM   Result Value Ref Range    Cholesterol 135 <200 mg/dL    HDL 50 >40 mg/dL    LDL Cholesterol 72 0 - 130 mg/dL    Chol/HDL Ratio 2.7 <5    Triglycerides 65 <150 mg/dL    VLDL NOT REPORTED 1 - 30 mg/dL   Phosphorus    Collection Time: 09/03/18  3:20 PM   Result Value Ref Range    Phosphorus 1.4 (L) 2.6 - 4.5 mg/dL   Osmolality    Collection Time: 09/03/18  3:20 PM   Result Value Ref Range    Serum Osmolality 303 (H) 275 - 295 mOsm/kg   Immature Platelet Fraction    Collection Time: 09/03/18  3:20 PM   Result Value Ref Range    Platelet, Immature Fraction 2.4 1.1 - 10.3 %    Platelet, Fluorescence 208 138 - 453 k/uL   Urine Drug Screen    Collection Time: 09/03/18  4:29 PM   Result Value Ref Range    Amphetamine Screen, Ur NEGATIVE NEG    Barbiturate Screen, Ur NEGATIVE NEG    Benzodiazepine Screen, Urine POSITIVE (A) NEG    Cocaine Metabolite, Urine NEGATIVE NEG    Methadone Screen, Urine NEGATIVE NEG    Opiates, Urine NEGATIVE NEG    Phencyclidine, Urine NEGATIVE NEG    Propoxyphene, Urine NOT REPORTED NEG    Cannabinoid Scrn, Ur NEGATIVE NEG    Oxycodone Screen, Ur NEGATIVE NEG    Methamphetamine, Urine NOT REPORTED NEG    Tricyclic Antidepressants, Urine NOT REPORTED NEG    MDMA, Urine NOT REPORTED NEG    Buprenorphine Urine NOT REPORTED NEG    Test Information       Assay provides medical screening only.   The absence of expected drug(s) and/or   POC Glucose Fingerstick    Collection Time: 09/03/18  5:22 PM   Result Value Ref Range    POC Glucose 126 (H) 65 - 105 mg/dL   POC Glucose Fingerstick    Collection Time: 09/03/18  7:42 PM   Result Value Ref Range    POC Glucose 153 (H) 65 - 105 mg/dL   OSMOLALITY    Collection Time: 09/03/18  8:06 PM   Result Value Ref Range    Serum Osmolality 293 275 - 295 mOsm/kg   OSMOLALITY    Collection Time: 09/03/18 11:25 PM   Result Value Ref Range    Serum Osmolality 291 275 - 295 mOsm/kg   Arterial Blood Gas, POC    Collection Time: 09/04/18  3:10 AM   Result Value Ref Range    POC pH 7.485 (H) 7.350 - 7.450    POC pCO2 29.3 (L) 35.0 - 48.0 mm Hg    POC PO2 129.4 (H) 83.0 - 108.0 mm Hg    POC HCO3 22.1 21.0 - 28.0 mmol/L    TCO2 (calc), Art 23 22.0 - 29.0 mmol/L    Negative Base Excess, Art NOT REPORTED 0.0 - 2.0    Positive Base Excess, Art 0 0.0 - 3.0    POC O2 SAT 99 (H) 94.0 - 98.0 %    O2 Device/Flow/% Adult Ventilator     Edilberto Test POSITIVE     Sample Site Left Radial Artery     Mode PRVC     FIO2 30.0     Pt Temp 37.6     POC pH Temp 7.48     POC pCO2 Temp 30 mm Hg    POC pO2 Temp 133 mm Hg   POCT Glucose    Collection Time: 09/04/18  3:10 AM   Result Value Ref Range    POC Glucose 152 (H) 74 - 100 mg/dL   CBC Auto Differential    Collection Time: 09/04/18  4:03 AM   Result Value Ref Range    WBC 11.6 (H) 3.5 - 11.3 k/uL    RBC 3.80 (L) 3.95 - 5.11 m/uL    Hemoglobin 12.0 11.9 - 15.1 g/dL    Hematocrit 35.5 (L) 36.3 - 47.1 %    MCV 93.4 82.6 - 102.9 fL    MCH 31.6 25.2 - 33.5 pg    MCHC 33.8 28.4 - 34.8 g/dL    RDW 14.0 11.8 - 14.4 %    Platelets See Reflexed IPF Result 138 - 453 k/uL    MPV NOT REPORTED 8.1 - 13.5 fL    NRBC Automated 0.0 0.0 per 100 WBC    Differential Type NOT REPORTED     WBC Morphology NOT REPORTED     RBC Morphology NOT REPORTED     Platelet Estimate NOT REPORTED     Immature Granulocytes 0 0 %    Seg Neutrophils 83 (H) 36 - 66 %    Lymphocytes 10 (L) 24 - 44 %    Monocytes 6 1 - 7 %    Eosinophils % 0 (L) 1 - 4 %    Basophils 1 0 - 2 %    Absolute Immature Granulocyte 0.00 0.00 - 0.30 k/uL    Segs Absolute 9.62 (H) 1.8 - 7.7 k/uL    Absolute Lymph # 1.16 1.0 - 4.8 k/uL    Absolute Mono # 0.70 0.1 - 0.8 k/uL    Absolute Eos # 0.00 0.0 - 0.4 k/uL    Basophils # 0.12 0.0 - 0.2 k/uL    Morphology Normal    Basic Metabolic Panel    Collection Time: 09/04/18  4:03 AM   Result Value Ref Range    Glucose 173 (H) 70 - 99 mg/dL    BUN 2 (L) 6 - 20 mg/dL    CREATININE 0.44 (L) 0.50 - 0.90 mg/dL    Bun/Cre Ratio NOT REPORTED 9 - 20    Calcium 8.3 (L) 8.6 - 10.4 mg/dL    Sodium 148 (H) 135 - 144 mmol/L    Potassium 3.1 (L) 3.7 - 5.3 mmol/L    Chloride 111 (H) 98 - 107 mmol/L    CO2 18 (L) 20 - 31 mmol/L    Anion Gap 19 (H) 9 - 17 mmol/L    GFR Non-African American >60 >60 mL/min    GFR African American >60 >60 mL/min    GFR Comment          GFR Staging NOT REPORTED    Magnesium    Collection Time: 09/04/18  4:03 AM   Result Value Ref Range    Magnesium 1.5 (L) 1.6 - 2.6 mg/dL   PHOSPHORUS    Collection Time: 09/04/18  4:03 AM   Result Value Ref Range    Phosphorus 1.2 (L) 2.6 - 4.5 mg/dL   CALCIUM, IONIZED    Collection Time: 09/04/18  4:03 AM   Result Value Ref Range    Calcium, Ion 1.25 1.13 - 1.33 mmol/L   Osmolality    Collection Time: 09/04/18  4:03 AM   Result Value Ref Range    Serum Osmolality 297 (H) 275 - 295 mOsm/kg   Immature Platelet Fraction    Collection Time: 09/04/18  4:03 AM   Result Value Ref Range    Platelet, Immature Fraction 2.4 1.1 - 10.3 %    Platelet, Fluorescence 184 138 - 453 k/uL   Lactic Acid, Whole Blood    Collection Time: 09/04/18  4:03 AM   Result Value Ref Range    Lactic Acid, Whole Blood 3.1 (H) 0.7 - 2.1 mmol/L   POC Glucose Fingerstick    Collection Time: 09/04/18  8:27 AM   Result Value Ref Range    POC Glucose 121 (H) 65 - 105 mg/dL   OSMOLALITY    Collection Time: 09/04/18 11:20 AM   Result Value Ref Range    Serum Osmolality 297 (H) 275 - 295 mOsm/kg   Sodium Lab    Collection Time: 09/04/18 11:20 AM   Result Value Ref Range    Sodium 146 (H) 135 - 144 mmol/L   POC Glucose Fingerstick    Collection Time: 09/04/18 12:31 PM   Result Value Ref Range    POC Glucose 132 (H) 65 - 105 mg/dL       RADIOLOGY:  XR CHEST PORTABLE   Final Result   1. Endotracheal tube terminates 4.5 cm from the lucio. 2. Enteric tube with proximal side-port in the distal esophagus. Advancement   by 5 cm should be considered.          MRI brain without contrast   Final Result   Large acute infarct in the right MCA distribution with midline shift and mass   effect as described above. Midline shift is not changed compared to   prior/recent CT head      Old large MCA infarct on the left      Multiple smaller additional old infarcts are noted as well         XR ABDOMEN (KUB) (SINGLE AP VIEW)   Final Result   Support lines and tubes as above. No evidence of bowel obstruction. XR CHEST PORTABLE   Final Result   1. Endotracheal tube with distal tip approximately 7.5 cm above the lucio. Enteric tube in appropriate position. 2. No evidence of acute intrathoracic process. 3. COPD. CTA NECK W CONTRAST   Final Result   Occlusion at the proximal aspect of the M1 segment of the right MCA, likely   contributing to known acute large right MCA territory infarction. Thrombus at the proximal aspect of the right subclavian artery, contributing   to near occlusion. Hypoplastic right vertebral artery, with minimal contrast opacification in   the V2 segment and nonvisualized V1 segment, stable since July 16, 2015.      50% focal stenosis in the left cavernous internal carotid artery secondary to   atherosclerotic calcification. The bilateral common and cervical internal carotid arteries are patent   without focal stenosis. The results were reported to Dr. Dlema Marquez at 9:54 p.m. on September 2, 2018. CTA HEAD W CONTRAST   Final Result   Occlusion at the proximal aspect of the M1 segment of the right MCA, likely   contributing to known acute large right MCA territory infarction. Thrombus at the proximal aspect of the right subclavian artery, contributing   to near occlusion. Hypoplastic right vertebral artery, with minimal contrast opacification in   the V2 segment and nonvisualized V1 segment, stable since July 16, 2015.      50% focal stenosis in the left cavernous internal carotid artery secondary to   atherosclerotic calcification.       The bilateral common and cervical internal carotid from 3.6    HEMATOLOGIC:  -H&H stable    ENDOCRINE:  PROBLEMS:  - hyperglycemia  PLAN:   - monitor blood glucose  - insulin therapy -  continue    OTHER:  -Palliative consult to determine power of , determine goals of care and closest approximation to patient's wishes    PROPHYLAXIS:   Stress ulcer: H2 blocker   VTE: lovenox    DISPOSITION:   Continue ICU           Martina Chaves MD, EM2  Neuro Critical Care  Pager [phone]  9/4/2018  3:37 PM

## 2018-09-04 NOTE — PROGRESS NOTES
Nutrition Assessment    Type and Reason for Visit: Reassess, Consult (TF ordering/management )    Nutrition Recommendations:Start Tube Feeding-suggest Glucerna goal 40 ml/hr (1152 kcal, 58 g pro/day). Monitor TF tolerance/intake, labs, fluid needs- modify TF as needed. Nutrition Diagnosis:   · Problem: Inadequate oral intake  · Etiology: related to Impaired respiratory function-inability to consume food     Signs and symptoms:  as evidenced by NPO status due to medical condition (need for nutrition support-EN)    Nutrition Assessment:  · Subjective Assessment: Pt remains on vent. Consulted for tube feeding. · Current Nutrition Therapies:  · Oral Diet Orders: NPO   · Tube Feeding (TF) Orders: None at present. Plan to start TF today. · Additional Calories: Propofol off   · Anthropometric Measures:  · Ht: 5' (152.4 cm)   · Current Body Wt: 96 lb (43.5 kg)  · Ideal Body Wt: 100 lb (45.4 kg), % Ideal Body 96%  · BMI Classification: BMI 18.5 - 24.9 Normal Weight  · Comparative Standards (Estimated Nutrition Needs):  · Estimated Daily Total Kcal: 3582-2831 kcal  · Estimated Daily Protein (g): 60-65 g    Estimated Intake vs Estimated Needs: Intake Less Than Needs    Nutrition Risk Level: High    Nutrition Interventions:   Start Tube Feeding-suggest Glucerna goal 40 ml/hr (1152 kcal, 58 g pro/day). Continued Inpatient Monitoring, Education Not Indicated    Nutrition Evaluation:   · Evaluation: Goals set   · Goals: meet % of estimated nutrition needs    · Monitoring: TF Intake, TF Tolerance, Weight, Comparative Standards, Pertinent Labs    See Adult Nutrition Doc Flowsheet for more detail.      Electronically signed by Albert Núñez RD, LD on 9/4/18 at 3:16 PM    Contact Number: [phone]

## 2018-09-04 NOTE — PROGRESS NOTES
Smoking Cessation - topics covered   []  Health Risks  []  Benefits of Quitting   []  Smoking Cessation  []  Patient has no history of tobacco use  []  Patient is former smoker. []  No need for tobacco cessation education. []  Booklet given  []  Patient verbalizes understanding. []  Patient denies need for tobacco cessation education. Admitting diagnosis precludes optimal reception of tobacco cessation education. Will defer at this time.   Anjum Warren  8:27 AM

## 2018-09-04 NOTE — PLAN OF CARE
Problem: Risk for Impaired Skin Integrity  Goal: Tissue integrity - skin and mucous membranes  Structural intactness and normal physiological function of skin and  mucous membranes.    Outcome: Ongoing  Pt mucous membranes remain pint and moist.

## 2018-09-04 NOTE — PROGRESS NOTES
Hepatic:   Recent Labs      09/02/18   2314   AST  14   ALT  6   BILITOT  0.69   ALKPHOS  101     Troponin:   Recent Labs      09/02/18   2201   TROPONINI  0.05     BNP: No results for input(s): BNP in the last 72 hours. Lipids:   Recent Labs      09/03/18   1520   CHOL  135   HDL  50     INR:   Recent Labs      09/02/18   2034   INR  1.1  1.1       Objective:   Vitals: /73   Pulse 83   Temp 98.4 °F (36.9 °C) (Oral)   Resp 16   Ht 5' (1.524 m)   Wt 96 lb 9 oz (43.8 kg)   LMP  (LMP Unknown)   SpO2 100%   BMI 18.86 kg/m²   General appearance: alert and cooperative with exam  HEENT: Head: Normocephalic, no lesions, without obvious abnormality. Neck: no JVD, trachea midline, no adenopathy  Lungs: Course to auscultation throughout  Heart: Regular rate and rhythm, s1/s2 auscultated, no murmurs. Tele SR  Abdomen: soft, non-tender, bowel sounds active  Extremities: no edema  Neurologic: not done        Assessment / Acute Cardiac Problems:   1. Right MCA CVA  2. IV drug abuse  3. Resp failure  4. Abnormal ECG    Patient Active Problem List:     Right hemisphere, cerebral infarction (HCC)     Somnolence     CHF (congestive heart failure) (HCC)     Heroin overdose     Lactic acidosis     Hyperglycemia     Acute respiratory failure (HCC)     H/O essential hypertension     Smoker     Seizure-like activity (HCC)     Bilious vomiting with nausea     Urinary incontinence, urge     Acute cerebrovascular accident (CVA) ([address]     Midline shift of brain due to stroke     Hx of ischemic left MCA stroke     Cerebrovascular accident (CVA) ([address]     Seizure ([address]      Plan of Treatment:   1. Awaiting echo. Continue supportive care. BP management per neuro recommendations. 2. Replace MG+ & K+ per sliding scales. Recommend K + >4 and Mg+ >=2.     Electronically signed by JAM Alfonso CNP on 9/4/2018 at 1:15 PM  [address]  [phone]

## 2018-09-04 NOTE — PLAN OF CARE
Problem: Falls - Risk of:  Goal: Will remain free from falls  Will remain free from falls   Outcome: Met This Shift  Fall risk precautions in place. Bed in lowest position with wheels locked, bed alarm in place and activated, orange blanket on bed, non-skid socks on pt, fall risk id on pt, call light in reach, pt encouraged to call before getting out of bed and for any other needs or c/o.

## 2018-09-05 ENCOUNTER — APPOINTMENT (OUTPATIENT)
Dept: CT IMAGING | Age: 58
DRG: 816 | End: 2018-09-05
Payer: COMMERCIAL

## 2018-09-05 LAB
ABSOLUTE EOS #: 0.13 K/UL (ref 0–0.44)
ABSOLUTE IMMATURE GRANULOCYTE: 0.05 K/UL (ref 0–0.3)
ABSOLUTE LYMPH #: 1.78 K/UL (ref 1.1–3.7)
ABSOLUTE MONO #: 0.66 K/UL (ref 0.1–1.2)
ALLEN TEST: POSITIVE
ANION GAP SERPL CALCULATED.3IONS-SCNC: 11 MMOL/L (ref 9–17)
BASOPHILS # BLD: 0 % (ref 0–2)
BASOPHILS ABSOLUTE: 0.04 K/UL (ref 0–0.2)
BUN BLDV-MCNC: 2 MG/DL (ref 6–20)
BUN/CREAT BLD: ABNORMAL (ref 9–20)
CALCIUM IONIZED: 1.08 MMOL/L (ref 1.13–1.33)
CALCIUM SERPL-MCNC: 7.6 MG/DL (ref 8.6–10.4)
CHLORIDE BLD-SCNC: 115 MMOL/L (ref 98–107)
CO2: 23 MMOL/L (ref 20–31)
CREAT SERPL-MCNC: 0.41 MG/DL (ref 0.5–0.9)
DIFFERENTIAL TYPE: ABNORMAL
EOSINOPHILS RELATIVE PERCENT: 1 % (ref 1–4)
FIO2: 30
GFR AFRICAN AMERICAN: >60 ML/MIN
GFR NON-AFRICAN AMERICAN: >60 ML/MIN
GFR SERPL CREATININE-BSD FRML MDRD: ABNORMAL ML/MIN/{1.73_M2}
GFR SERPL CREATININE-BSD FRML MDRD: ABNORMAL ML/MIN/{1.73_M2}
GLUCOSE BLD-MCNC: 122 MG/DL (ref 65–105)
GLUCOSE BLD-MCNC: 139 MG/DL (ref 70–99)
GLUCOSE BLD-MCNC: 143 MG/DL (ref 65–105)
GLUCOSE BLD-MCNC: 152 MG/DL (ref 65–105)
HCT VFR BLD CALC: 32 % (ref 36.3–47.1)
HEMOGLOBIN: 10.6 G/DL (ref 11.9–15.1)
IMMATURE GRANULOCYTES: 1 %
LACTIC ACID, WHOLE BLOOD: 1.8 MMOL/L (ref 0.7–2.1)
LYMPHOCYTES # BLD: 18 % (ref 24–43)
MAGNESIUM: 1.7 MG/DL (ref 1.6–2.6)
MCH RBC QN AUTO: 31.6 PG (ref 25.2–33.5)
MCHC RBC AUTO-ENTMCNC: 33.1 G/DL (ref 28.4–34.8)
MCV RBC AUTO: 95.5 FL (ref 82.6–102.9)
MODE: ABNORMAL
MONOCYTES # BLD: 7 % (ref 3–12)
NEGATIVE BASE EXCESS, ART: ABNORMAL (ref 0–2)
NRBC AUTOMATED: 0 PER 100 WBC
O2 DEVICE/FLOW/%: ABNORMAL
PATIENT TEMP: 37.6
PDW BLD-RTO: 14.1 % (ref 11.8–14.4)
PHOSPHORUS: 1 MG/DL (ref 2.6–4.5)
PLATELET # BLD: 235 K/UL (ref 138–453)
PLATELET ESTIMATE: ABNORMAL
PMV BLD AUTO: 10.3 FL (ref 8.1–13.5)
POC HCO3: 23.1 MMOL/L (ref 21–28)
POC O2 SATURATION: 98 % (ref 94–98)
POC PCO2 TEMP: 30 MM HG
POC PCO2: 29 MM HG (ref 35–48)
POC PH TEMP: 7.5
POC PH: 7.51 (ref 7.35–7.45)
POC PO2 TEMP: 106 MM HG
POC PO2: 101.8 MM HG (ref 83–108)
POSITIVE BASE EXCESS, ART: 1 (ref 0–3)
POTASSIUM SERPL-SCNC: 3 MMOL/L (ref 3.7–5.3)
POTASSIUM SERPL-SCNC: 3.3 MMOL/L (ref 3.7–5.3)
RBC # BLD: 3.35 M/UL (ref 3.95–5.11)
RBC # BLD: ABNORMAL 10*6/UL
SAMPLE SITE: ABNORMAL
SEG NEUTROPHILS: 73 % (ref 36–65)
SEGMENTED NEUTROPHILS ABSOLUTE COUNT: 7.38 K/UL (ref 1.5–8.1)
SERUM OSMOLALITY: 297 MOSM/KG (ref 275–295)
SERUM OSMOLALITY: 303 MOSM/KG (ref 275–295)
SERUM OSMOLALITY: 306 MOSM/KG (ref 275–295)
SODIUM BLD-SCNC: 149 MMOL/L (ref 135–144)
SODIUM BLD-SCNC: 149 MMOL/L (ref 135–144)
SODIUM BLD-SCNC: 150 MMOL/L (ref 135–144)
SODIUM BLD-SCNC: 150 MMOL/L (ref 135–144)
TCO2 (CALC), ART: 24 MMOL/L (ref 22–29)
WBC # BLD: 10 K/UL (ref 3.5–11.3)
WBC # BLD: ABNORMAL 10*3/UL

## 2018-09-05 PROCEDURE — 36600 WITHDRAWAL OF ARTERIAL BLOOD: CPT

## 2018-09-05 PROCEDURE — 99221 1ST HOSP IP/OBS SF/LOW 40: CPT | Performed by: FAMILY MEDICINE

## 2018-09-05 PROCEDURE — 6360000002 HC RX W HCPCS: Performed by: STUDENT IN AN ORGANIZED HEALTH CARE EDUCATION/TRAINING PROGRAM

## 2018-09-05 PROCEDURE — 6370000000 HC RX 637 (ALT 250 FOR IP): Performed by: NEUROLOGICAL SURGERY

## 2018-09-05 PROCEDURE — 2580000003 HC RX 258: Performed by: NURSE PRACTITIONER

## 2018-09-05 PROCEDURE — 82803 BLOOD GASES ANY COMBINATION: CPT

## 2018-09-05 PROCEDURE — 6360000002 HC RX W HCPCS: Performed by: EMERGENCY MEDICINE

## 2018-09-05 PROCEDURE — 2580000003 HC RX 258: Performed by: EMERGENCY MEDICINE

## 2018-09-05 PROCEDURE — 84132 ASSAY OF SERUM POTASSIUM: CPT

## 2018-09-05 PROCEDURE — 94770 HC ETCO2 MONITOR DAILY: CPT

## 2018-09-05 PROCEDURE — 85025 COMPLETE CBC W/AUTO DIFF WBC: CPT

## 2018-09-05 PROCEDURE — 95951 HC EEG MONITORING VIDEO RECORDING: CPT

## 2018-09-05 PROCEDURE — 6370000000 HC RX 637 (ALT 250 FOR IP): Performed by: STUDENT IN AN ORGANIZED HEALTH CARE EDUCATION/TRAINING PROGRAM

## 2018-09-05 PROCEDURE — 70450 CT HEAD/BRAIN W/O DYE: CPT

## 2018-09-05 PROCEDURE — 6370000000 HC RX 637 (ALT 250 FOR IP): Performed by: EMERGENCY MEDICINE

## 2018-09-05 PROCEDURE — 94003 VENT MGMT INPAT SUBQ DAY: CPT

## 2018-09-05 PROCEDURE — 2580000003 HC RX 258: Performed by: STUDENT IN AN ORGANIZED HEALTH CARE EDUCATION/TRAINING PROGRAM

## 2018-09-05 PROCEDURE — 99291 CRITICAL CARE FIRST HOUR: CPT | Performed by: PSYCHIATRY & NEUROLOGY

## 2018-09-05 PROCEDURE — 83735 ASSAY OF MAGNESIUM: CPT

## 2018-09-05 PROCEDURE — 83605 ASSAY OF LACTIC ACID: CPT

## 2018-09-05 PROCEDURE — 94762 N-INVAS EAR/PLS OXIMTRY CONT: CPT

## 2018-09-05 PROCEDURE — 2000000003 HC NEURO ICU R&B

## 2018-09-05 PROCEDURE — 83930 ASSAY OF BLOOD OSMOLALITY: CPT

## 2018-09-05 PROCEDURE — 80048 BASIC METABOLIC PNL TOTAL CA: CPT

## 2018-09-05 PROCEDURE — 84100 ASSAY OF PHOSPHORUS: CPT

## 2018-09-05 PROCEDURE — 84295 ASSAY OF SERUM SODIUM: CPT

## 2018-09-05 PROCEDURE — 6370000000 HC RX 637 (ALT 250 FOR IP): Performed by: FAMILY MEDICINE

## 2018-09-05 PROCEDURE — 6360000002 HC RX W HCPCS: Performed by: NURSE PRACTITIONER

## 2018-09-05 PROCEDURE — 36415 COLL VENOUS BLD VENIPUNCTURE: CPT

## 2018-09-05 PROCEDURE — 82947 ASSAY GLUCOSE BLOOD QUANT: CPT

## 2018-09-05 PROCEDURE — 82330 ASSAY OF CALCIUM: CPT

## 2018-09-05 PROCEDURE — 95951 PR EEG MONITORING/VIDEORECORD: CPT | Performed by: PSYCHIATRY & NEUROLOGY

## 2018-09-05 RX ORDER — 3% SODIUM CHLORIDE 3 G/100ML
25 INJECTION, SOLUTION INTRAVENOUS CONTINUOUS
Status: DISCONTINUED | OUTPATIENT
Start: 2018-09-05 | End: 2018-09-06

## 2018-09-05 RX ADMIN — DESMOPRESSIN ACETATE 40 MG: 0.2 TABLET ORAL at 21:06

## 2018-09-05 RX ADMIN — LEVETIRACETAM 1000 MG: 10 INJECTION INTRAVENOUS at 00:47

## 2018-09-05 RX ADMIN — FAMOTIDINE 20 MG: 20 TABLET, FILM COATED ORAL at 21:06

## 2018-09-05 RX ADMIN — ASPIRIN 81 MG: 81 TABLET, CHEWABLE ORAL at 08:32

## 2018-09-05 RX ADMIN — SODIUM CHLORIDE 50 ML/HR: 3 INJECTION, SOLUTION INTRAVENOUS at 22:45

## 2018-09-05 RX ADMIN — SODIUM CHLORIDE 50 ML/HR: 3 INJECTION, SOLUTION INTRAVENOUS at 19:18

## 2018-09-05 RX ADMIN — FAMOTIDINE 20 MG: 20 TABLET, FILM COATED ORAL at 08:32

## 2018-09-05 RX ADMIN — POTASSIUM CHLORIDE 20 MEQ: 400 INJECTION, SOLUTION INTRAVENOUS at 09:57

## 2018-09-05 RX ADMIN — POTASSIUM CHLORIDE 20 MEQ: 400 INJECTION, SOLUTION INTRAVENOUS at 13:04

## 2018-09-05 RX ADMIN — LEVETIRACETAM 1000 MG: 10 INJECTION INTRAVENOUS at 12:06

## 2018-09-05 RX ADMIN — ENOXAPARIN SODIUM 40 MG: 40 INJECTION SUBCUTANEOUS at 08:32

## 2018-09-05 RX ADMIN — INSULIN LISPRO 1 UNITS: 100 INJECTION, SOLUTION INTRAVENOUS; SUBCUTANEOUS at 21:14

## 2018-09-05 RX ADMIN — POTASSIUM CHLORIDE 20 MEQ: 400 INJECTION, SOLUTION INTRAVENOUS at 19:53

## 2018-09-05 RX ADMIN — POTASSIUM CHLORIDE 20 MEQ: 400 INJECTION, SOLUTION INTRAVENOUS at 11:12

## 2018-09-05 RX ADMIN — VANCOMYCIN HYDROCHLORIDE 750 MG: 10 INJECTION, POWDER, LYOPHILIZED, FOR SOLUTION INTRAVENOUS at 00:47

## 2018-09-05 RX ADMIN — PROPRANOLOL HYDROCHLORIDE 40 MG: 40 TABLET ORAL at 21:06

## 2018-09-05 RX ADMIN — POTASSIUM CHLORIDE 20 MEQ: 400 INJECTION, SOLUTION INTRAVENOUS at 21:05

## 2018-09-05 RX ADMIN — SODIUM CHLORIDE 50 ML/HR: 3 INJECTION, SOLUTION INTRAVENOUS at 02:28

## 2018-09-05 RX ADMIN — SODIUM CHLORIDE 50 ML/HR: 3 INJECTION, SOLUTION INTRAVENOUS at 13:07

## 2018-09-05 RX ADMIN — INSULIN LISPRO 1 UNITS: 100 INJECTION, SOLUTION INTRAVENOUS; SUBCUTANEOUS at 12:06

## 2018-09-05 RX ADMIN — PROPRANOLOL HYDROCHLORIDE 40 MG: 40 TABLET ORAL at 08:32

## 2018-09-05 ASSESSMENT — PULMONARY FUNCTION TESTS
PIF_VALUE: 18
PIF_VALUE: 16
PIF_VALUE: 17
PIF_VALUE: 17
PIF_VALUE: 16
PIF_VALUE: 17

## 2018-09-05 NOTE — PROGRESS NOTES
Resident Progress Note  Date: 2018  Time: 7:25 AM  Patient Name: Jered Navarro  Patient : 1960  [place] [facility] [place] [facility] [place]  Allergies: No Known Allergies    Interval History:  No acute events overnight. No noted episodes of seizure. Has been unable to contact family, no power of  has been identified. Patient does have 2 sons, palliative was consulted yesterday. Has been breathing above the vent. Intubated but not sedated.     Current Medications:  Scheduled Meds:   sodium chloride flush  10 mL Intravenous 2 times per day    famotidine  20 mg Oral BID    cefTRIAXone (ROCEPHIN) IV  1 g Intravenous Q24H    levetiracetam  1,000 mg Intravenous Q12H    propranolol  40 mg Oral BID    sodium chloride flush  10 mL Intravenous 2 times per day    enoxaparin  40 mg Subcutaneous Daily    atorvastatin  40 mg Per NG tube Nightly    aspirin  81 mg Per NG tube Daily    insulin lispro  0-6 Units Subcutaneous TID WC    insulin lispro  0-3 Units Subcutaneous Nightly    vancomycin (VANCOCIN) intermittent dosing (placeholder)   Other RX Placeholder    vancomycin  750 mg Intravenous Q12H    ketamine  0.25 mg/kg Intravenous Once       Continuous Infusions:   sodium chloride 50 mL/hr (18 0228)    propofol Stopped (18 0801)    sodium chloride 50 mL/hr at 18 1754    niCARdipine      norepinephrine Stopped (18 2349)       Vitals:  Patient Vitals for the past 8 hrs:   BP Temp Temp src Pulse Resp SpO2   18 0705 (!) 173/94 97.9 °F (36.6 °C) - 98 22 100 %   18 0600 (!) 171/88 - - 89 19 100 %   18 0505 (!) 164/94 - - 87 19 100 %   18 0400 (!) 159/89 99.7 °F (37.6 °C) Oral 89 20 100 %   18 0315 - - - 64 16 100 %   18 0305 (!) 177/95 - - 86 18 100 %   18 0205 (!) 172/98 - - 87 18 100 %   18 0105 (!) 151/95 - - 87 20 100 %   18 0005 (!) 166/85 98.6 °F (37 °C) Oral 89 22 100 %     Height and Weight  Height: 5' (152.4 cm)  Weight: 96 lb 9 oz (43.8 kg)  Weight Method: Estimated  BSA (Calculated - sq m): 0.92 sq meters  BMI (Calculated): 18.9  Body mass index is 18.86 kg/m². <16 Severe malnutrition  16-16.99 Moderate malnutrition  17-18.49 Mild malnutrition  18.5-24.9 Normal  25-29.9 Overweight (not obese)  30-34.9 Obese class 1 (Low Risk)  35-39.9 Obese class 2 (Moderate Risk)  ? 40 Obese class 3 (High Risk)    Labs (last 48 hours):  Recent Results (from the past 48 hour(s))   POC Glucose Fingerstick    Collection Time: 09/03/18 12:16 PM   Result Value Ref Range    POC Glucose 146 (H) 65 - 105 mg/dL   Lactic Acid, Whole Blood    Collection Time: 09/03/18  3:20 PM   Result Value Ref Range    Lactic Acid, Whole Blood 3.6 (H) 0.7 - 2.1 mmol/L   CBC Auto Differential    Collection Time: 09/03/18  3:20 PM   Result Value Ref Range    WBC 13.8 (H) 3.5 - 11.3 k/uL    RBC 3.84 (L) 3.95 - 5.11 m/uL    Hemoglobin 12.1 11.9 - 15.1 g/dL    Hematocrit 35.7 (L) 36.3 - 47.1 %    MCV 93.0 82.6 - 102.9 fL    MCH 31.5 25.2 - 33.5 pg    MCHC 33.9 28.4 - 34.8 g/dL    RDW 14.0 11.8 - 14.4 %    Platelets See Reflexed IPF Result 138 - 453 k/uL    MPV NOT REPORTED 8.1 - 13.5 fL    NRBC Automated 0.0 0.0 per 100 WBC    Differential Type NOT REPORTED     WBC Morphology NOT REPORTED     RBC Morphology NOT REPORTED     Platelet Estimate NOT REPORTED     Seg Neutrophils 82 (H) 36 - 65 %    Lymphocytes 11 (L) 24 - 43 %    Monocytes 6 3 - 12 %    Eosinophils % 0 (L) 1 - 4 %    Basophils 0 0 - 2 %    Immature Granulocytes 0 0 %    Segs Absolute 11.36 (H) 1.50 - 8.10 k/uL    Absolute Lymph # 1.54 1.10 - 3.70 k/uL    Absolute Mono # 0.85 0.10 - 1.20 k/uL    Absolute Eos # <0.03 0.00 - 0.44 k/uL    Basophils # <0.03 0.00 - 0.20 k/uL    Absolute Immature Granulocyte 0.05 0.00 - 0.30 k/uL   Basic Metabolic Panel    Collection Time: 09/03/18  3:20 PM   Result Value Ref Range    Glucose 200 (H) 70 - 99 mg/dL    BUN 3 (L) 6 - 20 mg/dL    CREATININE 0.41 (L) 0.50 - 0.90 mg/dL    Bun/Cre Ratio NOT REPORTED 9 - 20    Calcium 7.9 (L) 8.6 - 10.4 mg/dL    Sodium 144 135 - 144 mmol/L    Potassium 3.5 (L) 3.7 - 5.3 mmol/L    Chloride 109 (H) 98 - 107 mmol/L    CO2 19 (L) 20 - 31 mmol/L    Anion Gap 16 9 - 17 mmol/L    GFR Non-African American >60 >60 mL/min    GFR African American >60 >60 mL/min    GFR Comment          GFR Staging NOT REPORTED    Magnesium    Collection Time: 09/03/18  3:20 PM   Result Value Ref Range    Magnesium 1.2 (L) 1.6 - 2.6 mg/dL   Calcium, Ionized    Collection Time: 09/03/18  3:20 PM   Result Value Ref Range    Calcium, Ion 1.03 (L) 1.13 - 1.33 mmol/L   Lipid Panel    Collection Time: 09/03/18  3:20 PM   Result Value Ref Range    Cholesterol 135 <200 mg/dL    HDL 50 >40 mg/dL    LDL Cholesterol 72 0 - 130 mg/dL    Chol/HDL Ratio 2.7 <5    Triglycerides 65 <150 mg/dL    VLDL NOT REPORTED 1 - 30 mg/dL   Phosphorus    Collection Time: 09/03/18  3:20 PM   Result Value Ref Range    Phosphorus 1.4 (L) 2.6 - 4.5 mg/dL   Osmolality    Collection Time: 09/03/18  3:20 PM   Result Value Ref Range    Serum Osmolality 303 (H) 275 - 295 mOsm/kg   Immature Platelet Fraction    Collection Time: 09/03/18  3:20 PM   Result Value Ref Range    Platelet, Immature Fraction 2.4 1.1 - 10.3 %    Platelet, Fluorescence 208 138 - 453 k/uL   Urine Drug Screen    Collection Time: 09/03/18  4:29 PM   Result Value Ref Range    Amphetamine Screen, Ur NEGATIVE NEG    Barbiturate Screen, Ur NEGATIVE NEG    Benzodiazepine Screen, Urine POSITIVE (A) NEG    Cocaine Metabolite, Urine NEGATIVE NEG    Methadone Screen, Urine NEGATIVE NEG    Opiates, Urine NEGATIVE NEG    Phencyclidine, Urine NEGATIVE NEG    Propoxyphene, Urine NOT REPORTED NEG    Cannabinoid Scrn, Ur NEGATIVE NEG    Oxycodone Screen, Ur NEGATIVE NEG    Methamphetamine, Urine NOT REPORTED NEG    Tricyclic Antidepressants, Urine NOT REPORTED NEG    MDMA, Urine NOT REPORTED NEG    Buprenorphine Urine NOT REPORTED NEG    Test Information       Assay (H) 36 - 66 %    Lymphocytes 10 (L) 24 - 44 %    Monocytes 6 1 - 7 %    Eosinophils % 0 (L) 1 - 4 %    Basophils 1 0 - 2 %    Absolute Immature Granulocyte 0.00 0.00 - 0.30 k/uL    Segs Absolute 9.62 (H) 1.8 - 7.7 k/uL    Absolute Lymph # 1.16 1.0 - 4.8 k/uL    Absolute Mono # 0.70 0.1 - 0.8 k/uL    Absolute Eos # 0.00 0.0 - 0.4 k/uL    Basophils # 0.12 0.0 - 0.2 k/uL    Morphology Normal    Basic Metabolic Panel    Collection Time: 09/04/18  4:03 AM   Result Value Ref Range    Glucose 173 (H) 70 - 99 mg/dL    BUN 2 (L) 6 - 20 mg/dL    CREATININE 0.44 (L) 0.50 - 0.90 mg/dL    Bun/Cre Ratio NOT REPORTED 9 - 20    Calcium 8.3 (L) 8.6 - 10.4 mg/dL    Sodium 148 (H) 135 - 144 mmol/L    Potassium 3.1 (L) 3.7 - 5.3 mmol/L    Chloride 111 (H) 98 - 107 mmol/L    CO2 18 (L) 20 - 31 mmol/L    Anion Gap 19 (H) 9 - 17 mmol/L    GFR Non-African American >60 >60 mL/min    GFR African American >60 >60 mL/min    GFR Comment          GFR Staging NOT REPORTED    Magnesium    Collection Time: 09/04/18  4:03 AM   Result Value Ref Range    Magnesium 1.5 (L) 1.6 - 2.6 mg/dL   PHOSPHORUS    Collection Time: 09/04/18  4:03 AM   Result Value Ref Range    Phosphorus 1.2 (L) 2.6 - 4.5 mg/dL   CALCIUM, IONIZED    Collection Time: 09/04/18  4:03 AM   Result Value Ref Range    Calcium, Ion 1.25 1.13 - 1.33 mmol/L   Osmolality    Collection Time: 09/04/18  4:03 AM   Result Value Ref Range    Serum Osmolality 297 (H) 275 - 295 mOsm/kg   Immature Platelet Fraction    Collection Time: 09/04/18  4:03 AM   Result Value Ref Range    Platelet, Immature Fraction 2.4 1.1 - 10.3 %    Platelet, Fluorescence 184 138 - 453 k/uL   Lactic Acid, Whole Blood    Collection Time: 09/04/18  4:03 AM   Result Value Ref Range    Lactic Acid, Whole Blood 3.1 (H) 0.7 - 2.1 mmol/L   POC Glucose Fingerstick    Collection Time: 09/04/18  8:27 AM   Result Value Ref Range    POC Glucose 121 (H) 65 - 105 mg/dL   OSMOLALITY    Collection Time: 09/04/18 11:20 AM   Result Value Ref Range    Serum Osmolality 297 (H) 275 - 295 mOsm/kg   Sodium Lab    Collection Time: 09/04/18 11:20 AM   Result Value Ref Range    Sodium 146 (H) 135 - 144 mmol/L   POC Glucose Fingerstick    Collection Time: 09/04/18 12:31 PM   Result Value Ref Range    POC Glucose 132 (H) 65 - 105 mg/dL   OSMOLALITY    Collection Time: 09/04/18  3:10 PM   Result Value Ref Range    Serum Osmolality 297 (H) 275 - 295 mOsm/kg   POC Glucose Fingerstick    Collection Time: 09/04/18  4:24 PM   Result Value Ref Range    POC Glucose 105 65 - 105 mg/dL   Sodium Lab    Collection Time: 09/04/18  5:56 PM   Result Value Ref Range    Sodium 145 (H) 135 - 144 mmol/L   POC Glucose Fingerstick    Collection Time: 09/04/18  8:06 PM   Result Value Ref Range    POC Glucose 97 65 - 105 mg/dL   OSMOLALITY    Collection Time: 09/04/18  8:10 PM   Result Value Ref Range    Serum Osmolality 292 275 - 295 mOsm/kg   OSMOLALITY    Collection Time: 09/04/18 11:50 PM   Result Value Ref Range    Serum Osmolality 303 (H) 275 - 295 mOsm/kg   SODIUM    Collection Time: 09/04/18 11:50 PM   Result Value Ref Range    Sodium 149 (H) 135 - 144 mmol/L   Arterial Blood Gas, POC    Collection Time: 09/05/18  3:21 AM   Result Value Ref Range    POC pH 7.509 (H) 7.350 - 7.450    POC pCO2 29.0 (L) 35.0 - 48.0 mm Hg    POC PO2 101.8 83.0 - 108.0 mm Hg    POC HCO3 23.1 21.0 - 28.0 mmol/L    TCO2 (calc), Art 24 22.0 - 29.0 mmol/L    Negative Base Excess, Art NOT REPORTED 0.0 - 2.0    Positive Base Excess, Art 1 0.0 - 3.0    POC O2 SAT 98 94.0 - 98.0 %    O2 Device/Flow/% Adult Ventilator     Edilberto Test POSITIVE     Sample Site Left Radial Artery     Mode PRVC     FIO2 30.0     Pt Temp 37.6     POC pH Temp 7.50     POC pCO2 Temp 30 mm Hg    POC pO2 Temp 106 mm Hg   OSMOLALITY    Collection Time: 09/05/18  4:02 AM   Result Value Ref Range    Serum Osmolality 297 (H) 275 - 295 mOsm/kg   Basic Metabolic Panel    Collection Time: 09/05/18  5:52 AM   Result Value Ref Range proximal side-port in the distal esophagus. Advancement   by 5 cm should be considered. MRI brain without contrast   Final Result   Large acute infarct in the right MCA distribution with midline shift and mass   effect as described above. Midline shift is not changed compared to   prior/recent CT head      Old large MCA infarct on the left      Multiple smaller additional old infarcts are noted as well         XR ABDOMEN (KUB) (SINGLE AP VIEW)   Final Result   Support lines and tubes as above. No evidence of bowel obstruction. XR CHEST PORTABLE   Final Result   1. Endotracheal tube with distal tip approximately 7.5 cm above the lucio. Enteric tube in appropriate position. 2. No evidence of acute intrathoracic process. 3. COPD. CTA NECK W CONTRAST   Final Result   Occlusion at the proximal aspect of the M1 segment of the right MCA, likely   contributing to known acute large right MCA territory infarction. Thrombus at the proximal aspect of the right subclavian artery, contributing   to near occlusion. Hypoplastic right vertebral artery, with minimal contrast opacification in   the V2 segment and nonvisualized V1 segment, stable since July 16, 2015.      50% focal stenosis in the left cavernous internal carotid artery secondary to   atherosclerotic calcification. The bilateral common and cervical internal carotid arteries are patent   without focal stenosis. The results were reported to Dr. Loida Duval at 9:54 p.m. on September 2, 2018. CTA HEAD W CONTRAST   Final Result   Occlusion at the proximal aspect of the M1 segment of the right MCA, likely   contributing to known acute large right MCA territory infarction. Thrombus at the proximal aspect of the right subclavian artery, contributing   to near occlusion.       Hypoplastic right vertebral artery, with minimal contrast opacification in   the V2 segment and nonvisualized V1 segment, stable since July 16, 2015.      50% focal stenosis in the left cavernous internal carotid artery secondary to   atherosclerotic calcification. The bilateral common and cervical internal carotid arteries are patent   without focal stenosis. The results were reported to Dr. Birseyda Harding at 9:54 p.m. on September 2, 2018. CT Head WO Contrast   Final Result   Acute large right MCA territory infarction, involving the right frontal,   temporal, parietal lobes and the right basal ganglia/insular cortex. Associated mass effect and 4 mm right to left midline shift. No acute intracranial hemorrhage. 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 The results were reported to Dr. Briseyda Harding at 9:09 p.m. on September 2, 2018. Labs and imaging reviewed with Dr. Isaiah Eugene:  Gen: Intubated  CV: RRR  Resp: CTAB  Abd: soft, non-distended  Skin: Cap refill <2 seconds    NEURO EXAM:  Intubated. localizing to sternal rub with right hand. Patient is responsive to pain in all 4 extremities, more briskly on the right. NUTRITION:   Continuous tube feeds    DRAINS:   There are no drains to monitor at this time. DVT PROPHYLAXIS:  SCD sleeves  Thigh High ROBBIE Stockings  No anticoagulation at this time. ASSESSMENT/PLAN:    NEUROLOGIC:  -Right-sided MCA infarct  -Neuro exam completed off of propofol, no distress.   Tolerating vent  -3% saline  -Follow up LTM E read  -Aspirin and statin  -Neuro checks  -Keppra increased to 1 g twice a day  -CIWA    CARDIOVASCULAR:  -Propanolol for blood pressure and withdrawal symptoms  -Blood pressure has been within goal of , stable  -Cardiology consulted  -Echo showing 30% left ventricular ejection fraction with diastolic dysfunction    PULMONARY:  -alkalosis on blood gas overnight, patient's respiratory rate reduced, but breathes over the vent, current RR 22    RENAL/FLUID/ELECTROLYTE:  -target sodium between 145-150  -q6h sodium  -3% saline at 50 mL/h    GI/NUTRITION:  NUTRITION:  DIET TUBE FEED CONTINUOUS/CYCLIC NPO; Diabetic; 10; 40; 24  - continuous tube feeds initiated    ID:  -Continue ceftriaxone and vancomycin  -Urine culture negative, blood culture negative ×3 days    HEMATOLOGIC:  -H&H stable    ENDOCRINE:  PROBLEMS:  - hyperglycemia  PLAN:   - monitor blood glucose  - insulin therapy -  continue    OTHER:  -Palliative consult to determine power of , determine goals of care and closest approximation to patient's wishes    PROPHYLAXIS:   Stress ulcer: H2 blocker   VTE: lovenox    DISPOSITION:   Continue ICU           Verna Woodall MD, EM2  Neuro Critical Care  Pager [phone]  9/5/2018  7:25 AM

## 2018-09-05 NOTE — PROCEDURES
[address] [facility], [facility]      CONTINUOUS VIDEO ELECTROENCEPHALOGRAM REPORT        REFERRING PHYSICIAN:  Eulalia Truong MD  PATIENT NAME:Michelle Dumont  PATIENT MRN: [id number]  DATE OF EE2018 @ 9:56 AM to 2018 @ 8 :64 AM    BRIEF HISTORY:  56year old female with history of last left MCA stroke presented with AMS, was found to have new large right MCV ischemic infarct. EEG to evaluate. CURRENT ANTI-EPILEPTIC MEDICATIONS: Keppra 1000mg BID; Ativan PRN    EEG DESCRIPTION:   The background was alternated with generalized continuous delta, theta slow in 2-6 Hz and bursts of low amplitude alpha and theta activities in right hemisphere and left frontal in 6-11Hz. No posterior dominant rhythm. There were no eye opening/closing. There was reactivity to stimulation and spontaneous variation. There were rare sharp waves in left frontal and right frontal/central in this recording, there was no EEG seizures. There was suppression on the left temporal/parietal.       Photic stimulation and hyperventilation were not  performed. Heart rate was regular at ~70s per minute on a single lead EKG with rare arrhythmia. CLASSIFICATION:  Abnormal significance III (Coma)  1. Sharp waves, regional, bifrontal, right central  2. Continuous slow, generalized   3. Asymmetric, attenuation on left temporal/parietal      IMPRESSION:  Recording from 2018 @ 9:56AM to 2018 @ 8:56AM showed epileptogenicity in bifrontal and right central regions. No EEG or clinical seizures. There was evidence of severe diffuse encephalopathy and severe bilateral structural abnormalities, worse on the left consistent with patient's history of bi-hemispheric large ischemic strokes.        Lauren Castano MD, [address] [place] [address], [facility]

## 2018-09-05 NOTE — PROGRESS NOTES
[facility]   Progress Note                   Date:   9/5/2018  Patient name: Corey Aquino  Date of admission:  9/2/2018  7:50 PM  MRN:   [id number]  YOB: 1960  PCP: Caesar Murray MD    Reason for Admission: Acute cerebrovascular accident (CVA) [facility]) [I63.9]    Subjective:       Clinical Changes / Abnormalities: Patient seen & examined in room. Remains intubated. No sedation. Responds only to pain. Tele reviewed - SR. BP spikes at times. seizures on EEG. Discussed with RN. Trying to get a hold of children to obtain POA.      ROS    Medications:   Scheduled Meds:   sodium chloride flush  10 mL Intravenous 2 times per day    famotidine  20 mg Oral BID    cefTRIAXone (ROCEPHIN) IV  1 g Intravenous Q24H    levetiracetam  1,000 mg Intravenous Q12H    propranolol  40 mg Oral BID    sodium chloride flush  10 mL Intravenous 2 times per day    enoxaparin  40 mg Subcutaneous Daily    atorvastatin  40 mg Per NG tube Nightly    aspirin  81 mg Per NG tube Daily    insulin lispro  0-6 Units Subcutaneous TID WC    insulin lispro  0-3 Units Subcutaneous Nightly    vancomycin (VANCOCIN) intermittent dosing (placeholder)   Other RX Placeholder    vancomycin  750 mg Intravenous Q12H    ketamine  0.25 mg/kg Intravenous Once     Continuous Infusions:   sodium chloride 50 mL/hr (09/05/18 0228)    propofol Stopped (09/03/18 0801)    sodium chloride 50 mL/hr at 09/03/18 1754    niCARdipine      norepinephrine Stopped (09/02/18 2349)     CBC:   Recent Labs      09/03/18   1520  09/04/18   0403  09/05/18   0552   WBC  13.8*  11.6*  10.0   HGB  12.1  12.0  10.6*   PLT  See Reflexed IPF Result  See Reflexed IPF Result  235     BMP:    Recent Labs      09/03/18   1520  09/04/18   0403   09/04/18   1756  09/04/18   2350  09/05/18   0552   NA  144  148*   < >  145*  149*  149*   K  3.5*  3.1*   --    --    --   3.0*   CL  109*  111*   --    --    --   115*   CO2  19*  18*   --    --    -- 23   BUN  3*  2*   --    --    --   2*   CREATININE  0.41*  0.44*   --    --    --   0.41*   GLUCOSE  200*  173*   --    --    --   139*    < > = values in this interval not displayed. Hepatic:   Recent Labs      09/02/18   2314   AST  14   ALT  6   BILITOT  0.69   ALKPHOS  101     Troponin:   Recent Labs      09/02/18   2201   TROPONINI  0.05     BNP: No results for input(s): BNP in the last 72 hours. Lipids:   Recent Labs      09/03/18   1520   CHOL  135   HDL  50     INR:   Recent Labs      09/02/18   2034   INR  1.1  1.1       Objective:   Vitals: BP (!) 167/102   Pulse 76   Temp 97.9 °F (36.6 °C)   Resp 27   Ht 5' (1.524 m)   Wt 96 lb 9 oz (43.8 kg)   LMP  (LMP Unknown)   SpO2 100%   BMI 18.86 kg/m²   General appearance: intubated & not following commands. HEENT: Head: Normocephalic, no lesions, without obvious abnormality. Neck: no JVD, trachea midline, no adenopathy  Lungs: Course to auscultation throughout  Heart: Regular rate and rhythm, s1/s2 auscultated, no murmurs. Tele SR  Abdomen: soft, non-tender, bowel sounds active  Extremities: no edema  Neurologic: not done    Echo 9*4/18  Left ventricle is normal in size. Global left ventricular systolic function  is severely reduced with global hypokinesis. Estimated LV ejection fraction is 30% . Mild left ventricular hypertrophy. Evidence of advanced Diastolic dysfunction suggestive of elevated LV filling  pressures. Left atrium is moderately dilated. Normal right ventricle size and function. Moderate mitral regurgitation. Estimated right ventricular systolic pressure is 25 mmHg. Small loculated pericardial effusion adjacent to right atrium is seen. Assessment / Acute Cardiac Problems:   1. Right MCA CVA  2. IV drug abuse  3. Resp failure  4. Abnormal ECG  5. Cardiomyopathy with EF 30% per echo  6.  Diastolic dysfunction    Patient Active Problem List:     Right hemisphere, cerebral infarction (HCC)     Somnolence     CHF (congestive heart failure) (HCC)     Heroin overdose     Lactic acidosis     Hyperglycemia     Acute respiratory failure (HCC)     H/O essential hypertension     Smoker     Seizure-like activity (HCC)     Bilious vomiting with nausea     Urinary incontinence, urge     Acute cerebrovascular accident (CVA) ([address]     Midline shift of brain due to stroke     Hx of ischemic left MCA stroke     [facility] ([facility]) [facility]     Seizure ([facility]      Plan of Treatment:   1. Echo as above. Continue supportive care. Given current neurological status no plans for ischemic work-up at this time. BP management per neuro recommendations. If neurologically improves can reassess for stress vs cath. 2. Replace MG+ & K+ per sliding scales. Recommend K + >4 and Mg+ >=2.  3. Will sign off at this time. If neurological improvement or change in status may re-consult for ischemic work-up consideration. Thank you.     Electronically signed by JAM Aponte CNP on 9/5/2018 at 10:22 [address] [facility].  [phone]

## 2018-09-05 NOTE — PLAN OF CARE
Problem: Risk for Impaired Skin Integrity  Goal: Tissue integrity - skin and mucous membranes  Structural intactness and normal physiological function of skin and  mucous membranes. Outcome: Ongoing  Skin assessment complete. No breakdown noted. Interventions in place. Pt. Turned q 2 hours. Cream applied.   Will continue to monitor for additional needs and skin breakdown

## 2018-09-05 NOTE — PLAN OF CARE
Problem: Risk for Impaired Skin Integrity  Goal: Tissue integrity - skin and mucous membranes  Structural intactness and normal physiological function of skin and  mucous membranes. Outcome: Met This Shift  Skin assessed for new breakdown and redness, patient turned every 2 hours, preventative mepilex remains in place, heels and elbows elevated off of bed on pillows. Will continue to monitor.

## 2018-09-05 NOTE — CONSULTS
Palliative Care Inpatient Consult    NAME:  [address] [place] [address] RECORD NUMBER:  [id number]  AGE: 56 y.o. GENDER: female  : 1960  TODAY'S DATE:  2018    Reasons for Consultation:    Symptom and/or pain management  Provision of information regarding PC and/or hospice philosophies  Complex, time-intensive communication and interdisciplinary psychosocial support  Clarification of goals of care and/or assistance with difficult decision-making  Guidance in regards to resources and transition(s)    Members of PC team contributing to this consultation are :  Dr. Carlota Diana palliative care attending  History of Present Illness     The patient is a 56 y.o. Unavailable/unknown female who presents with Drug Overdose (brought to front door of triage, sts patient \"took a lot of drugs last night\")      Referred to Palliative Care by   [x] Physician   [] Nursing  [] Family Request   [] Other:       She was admitted to the Neuro critical care ICU service for Acute cerebrovascular accident (CVA) ([facility] [I63.9]. Her hospital course has been associated with <principal problem not specified>. The patient has a complicated medical history and has been hospitalized since 2018  7:50 PM. The history has been obtained from patient's records. The patient was brought in by EMS and was emergently intubated in the ED with concerns of respiratory failure. Patient has history of IV drug/heroine abuse and was given Narcan prior to being brought here but did not show much improvement. The patient has history of large left MCA infarction and is on antiseizure medications with history of seizures. The patient was hypotensive with systolic pressures in 83T and was on vasopressor infusions. Patient also has history of coronary artery disease and history of CHF based on echogram in 2016. MRI brain showed large right hemispheric infarction, with midline shift and mass effect also evidence of old left MCA stroke with encephalomalacia changes. Neurosurgery was consulted and they recommended medical therapy and no surgery. Palliative care was consulted for goals of care, POA identification as no family has been able to be contacted. Active Hospital Problems    Diagnosis Date Noted    Cerebrovascular accident (CVA) ([facility] [I63.9]     Seizure ([facility] [R56.9]     Midline shift of brain due to stroke [G93.9]     Hx of ischemic left MCA stroke [Z86.73]     Acute cerebrovascular accident (CVA) ([facility] [I63.9] 09/02/2018       PAST MEDICAL HISTORY      Diagnosis Date    CAD (coronary artery disease)     CHF (congestive heart failure) ([facility]     Hypertension     MRSA (methicillin resistant staph aureus) culture positive 09/03/2018    nares    Seizure ([facility]     Unspecified cerebral artery occlusion with cerebral infarction     Urinary incontinence, urge 10/16/2017       PAST SURGICAL HISTORY  No past surgical history on file.     SOCIAL HISTORY  Social History   Substance Use Topics    Smoking status: Current Every Day Smoker     Packs/day: 2.00     Types: Cigarettes    Smokeless tobacco: Never Used    Alcohol use No       ALLERGIES  No Known Allergies      MEDICATIONS  Current Medications    insulin lispro  0-6 Units Subcutaneous 4x Daily AC & HS    sodium chloride flush  10 mL Intravenous 2 times per day    famotidine  20 mg Oral BID    levetiracetam  1,000 mg Intravenous Q12H    propranolol  40 mg Oral BID    sodium chloride flush  10 mL Intravenous 2 times per day    enoxaparin  40 mg Subcutaneous Daily    atorvastatin  40 mg Per NG tube Nightly    aspirin  81 mg Per NG tube Daily     sodium chloride flush, LORazepam **OR** LORazepam **OR** LORazepam **OR** LORazepam **OR** LORazepam **OR** LORazepam **OR** LORazepam **OR** LORazepam, potassium chloride, magnesium sulfate, sodium chloride flush, magnesium hydroxide, ondansetron, labetalol  IV Drips/Infusions   sodium chloride 50 mL/hr (09/05/18 0228)    sodium chloride 50 mL/hr at 09/03/18 5694     Home Medications  No current facility-administered medications on file prior to encounter.       Current Outpatient Prescriptions on File Prior to Encounter   Medication Sig Dispense Refill    metoprolol succinate (TOPROL XL) 50 MG extended release tablet take 1 tablet by mouth once daily 30 tablet 3    metoprolol succinate (TOPROL XL) 50 MG extended release tablet Take 1 tablet by mouth daily 30 tablet 3    aspirin 81 MG chewable tablet Take 1 tablet by mouth daily 30 tablet 3    acetaminophen-codeine (TYLENOL #3) 300-30 MG per tablet Take 1 tablet by mouth every 6 hours as needed for Pain 40 tablet 0    gabapentin (NEURONTIN) 300 MG capsule Take 1 capsule by mouth 3 times daily 90 capsule 3    Incontinence Supply Disposable (INCONTINENCE BRIEF MEDIUM) MISC Use daily 5 Package 11    metoprolol succinate (TOPROL XL) 25 MG extended release tablet Take 0.5 tablets by mouth daily 30 tablet 3    docusate (COLACE, DULCOLAX) 100 MG CAPS Take 100 mg by mouth 2 times daily as needed (constipation) 60 capsule 3    levETIRAcetam (KEPPRA) 500 MG tablet Take 1 tablet by mouth 2 times daily 60 tablet 3    famotidine (PEPCID) 20 MG tablet Take 1 tablet by mouth daily 30 tablet 3    atorvastatin (LIPITOR) 80 MG tablet Take 0.5 tablets by mouth nightly 30 tablet 3    metFORMIN (GLUCOPHAGE) 500 MG tablet Take 1 tablet by mouth daily (with breakfast) 30 tablet 3    oxybutynin (DITROPAN-XL) 5 MG CR tablet Take 1 tablet by mouth nightly 30 tablet 3       Data         BP (!) 158/81   Pulse 82   Temp 98.7 °F (37.1 °C)   Resp 25   Ht 5' (1.524 m)   Wt 96 lb 9 oz (43.8 kg)   LMP  (LMP Unknown)   SpO2 100%   BMI 18.86 kg/m²     Wt Readings from Last 3 Encounters:   09/03/18 96 lb 9 oz (43.8 kg)   10/16/17 100 lb 12.8 oz (45.7 kg)   09/15/16 96 lb 6.4 oz (43.7 kg)        Code Status: Full Code     ADVANCED CARE PLANNING:  Patient has capacity for medical decisions: no  Health Care Power of : no  Living Will: no     Personal, Social, and Family History  Marital Status: Not asked  Living situation: Not  Does patient understand diagnosis/treatment? no  Does caregiver understand diagnosis/treatment? not asked      Assessment        REVIEW OF SYSTEMS     ROS unable to be done due to altered mental status changes, unresponsiveness    PHYSICAL ASSESSMENT:  Constitutional: Unresponsive, intubated, off sedation. Head: Normocephalic and atraumatic. Eyes: EOM are normal. Pupils are equal, round   Neck: Normal range of motion. Neck supple. No tracheal deviation present. Cardiovascular: Normal rate and regular rhythm, S1, S2, no murmur   Pulmonary/Chest: Intubated, coarse breath sounds present  Abdomen: Soft, not distended, no ascites, no organomegaly   Musculoskeletal: Normal range of motion. No edema lower ext. Neurological:  unresponsive, not following commands   Skin: Normal turgor, no bleeding, no bruising     Palliative Performance Scale:  ___60%  Ambulation reduced; Significant disease; Can't do hobbies/housework; intake normal or reduced; occasional assist; LOC full/confusion  ___50%  Mainly sit/lie; Extensive disease; Can't do any work; Considerable assist; intake normal or reduced; LOC full/confusion  ___40%  Mainly in bed; Extensive disease; Mainly assist; intake normal or reduced; LOC full/confusion   ___30%  Bed Bound; Extensive disease; Total care; intake reduced; LOCfull/confusion  __x_20%  Bed Bound; Extensive disease; Total care; intake minimal; Drowsy/coma  ___10%  Bed Bound; Extensive disease;  Total care; Mouth care only; Drowsy/coma  ___0       Death      Plan      Palliative Interaction:    - The patient was seen today and she remains intubated and unresponsive     - The patient's current medical conditions were discussed with neuro critical care attending Dr. Chente Angel    - The patient's family members have not been able to be contacted    - As per records the patient's boyfriend Mayelin Mc told that patient has been estranged from her family and she has a son named Althea Taveras and a daughter named Arti Oakley    - I called Althea Taveras at - [phone] and explained to him that we were looking for the family for our patient Wen Salinas     - I asked Lj Givens whether he was patient's son and he replied that he has to talk to his father before answering that question I explained him that we need information for patient's family to make some very important decisions for her care    - Lj Givens told me that he has never been in contact with his mother and when I asked him whether he had a sister named Arti Oakley he told that he does have a sister who may be his half-sister by the name of Mckenna Lambert told that Arti Oakley moved out of her father's home when she was about 8years old and went to live with her mother and he has not been in contact with her except about 8 years ago when a meeting was arranged to meet her and that meeting was not a pleasant one and since then he has had no contact with Mckenna Lambert did inform me that Arti Oakley lives in [address] told me that he will call back once he talks with his father    - Lj Givens called back and informed me that his father told him that he does not want to have any contact with Antoinette Pablo and he does not want to talk about her as he left her many years back     - Lj Givens did not want to give me any information regarding his father's name or contact number as he stated that his father does not want to speak about Consuella Charley did ask me that how are we sure that the patient is Antoinette Pablo and I informed him that [facility] has her records and a valid ID that states her to be as Jazmin Koch did mention that he does not want to have any contact with Antoinette Pablo but if need arises then he can be contacted as a last person    - I informed Hebersean Givens that I will be contacting Arti Oakley so that more information can be obtained for patient's family - I told Nargis Kang that I will be contacting him again soon    Education/support to staff  Education/support to family  Communications with primary service  Caregiver support/education  Family conflict identified Estranged  Other major issues     Principle Problem/Diagnosis:  Acute CVA    Additional Assessments:   Right MCA CVA    IV drug abuse  Respiratory failure  Intubated  Abnormal EEG  Cardiomyopathy with EF 30% per echo  Diastolic dysfunction  Heroine overdose  Seizure-like activity    1- Symptom management/ pain control     Pain Assessment:  The patient is not having any pain. Anxiety:  none                          Dyspnea:  none                          Fatigue:  none    Other:  Intubated and unresponsive    We feel the patient symptoms are being controlled. her current regimen is reviewed by myself and discussed with the staff. 2- Goals of care evaluation   The patient goals of care are provide comfort care/support/palliation/relieve suffering, spiritual needs, strengthening relationships, preserve independence/autonomy/control and support for family/caregiver   Goals of care discussed with:    [] Patient independently    [] Patient and Family    [x] Family or Healthcare DPOA independently    [] Unable to discuss with patient, family/DPOA not present    3- Code Status  Full Code    4- Other recommendations   -  We will follow-up with contacting the family for further goals of care   - We will continue to provide comfort and support to the patient and the family  Please call with any palliative questions or concerns. Palliative Care Team is available via perfect serve or via phone. Palliative Care will continue to follow Ms. Halibasilia jimenez as needed. The total time spent in seeing the patient and discussing goals of care was 60 minutes     This note has been dictated by dragon, typing errors may be a possibility.     Thank you for allowing Palliative Care to participate in the care of Ms. Boston Steve .     Electronically signed by   Natalia Kimble MD  Palliative Care Team  on 9/5/2018 at 12:59 PM    Palliative care office: [phone]

## 2018-09-05 NOTE — PROGRESS NOTES
Continuous vEEG Monitoring Brief Update Note    vEEG showed continuous slow, indicative severe diffuse encephalopathy and bilateral structural abnormalities. Very rare right frontal/cental and left frontal sharp waves. At times, there were periodic bursts of fast activity the right hemisphere and left frontal in alpha frequency with low amplitude, unclear clinical significance, during these bursts, patient had no clinical signs.        Arhtur West MD, [address] [place] [address], Neurology  Tel: [phone]

## 2018-09-06 ENCOUNTER — APPOINTMENT (OUTPATIENT)
Dept: GENERAL RADIOLOGY | Age: 58
DRG: 816 | End: 2018-09-06
Payer: COMMERCIAL

## 2018-09-06 LAB
ABSOLUTE EOS #: 0.11 K/UL (ref 0–0.44)
ABSOLUTE IMMATURE GRANULOCYTE: 0.05 K/UL (ref 0–0.3)
ABSOLUTE LYMPH #: 1.52 K/UL (ref 1.1–3.7)
ABSOLUTE MONO #: 0.51 K/UL (ref 0.1–1.2)
ALLEN TEST: POSITIVE
ANION GAP SERPL CALCULATED.3IONS-SCNC: 9 MMOL/L (ref 9–17)
BASOPHILS # BLD: 0 % (ref 0–2)
BASOPHILS ABSOLUTE: <0.03 K/UL (ref 0–0.2)
BUN BLDV-MCNC: 4 MG/DL (ref 6–20)
BUN/CREAT BLD: ABNORMAL (ref 9–20)
CALCIUM IONIZED: 1.13 MMOL/L (ref 1.13–1.33)
CALCIUM SERPL-MCNC: 7.9 MG/DL (ref 8.6–10.4)
CHLORIDE BLD-SCNC: 121 MMOL/L (ref 98–107)
CO2: 21 MMOL/L (ref 20–31)
CREAT SERPL-MCNC: 0.4 MG/DL (ref 0.5–0.9)
DIFFERENTIAL TYPE: ABNORMAL
EOSINOPHILS RELATIVE PERCENT: 1 % (ref 1–4)
FIO2: 30
GFR AFRICAN AMERICAN: >60 ML/MIN
GFR NON-AFRICAN AMERICAN: >60 ML/MIN
GFR SERPL CREATININE-BSD FRML MDRD: ABNORMAL ML/MIN/{1.73_M2}
GFR SERPL CREATININE-BSD FRML MDRD: ABNORMAL ML/MIN/{1.73_M2}
GLUCOSE BLD-MCNC: 119 MG/DL (ref 65–105)
GLUCOSE BLD-MCNC: 149 MG/DL (ref 65–105)
GLUCOSE BLD-MCNC: 167 MG/DL (ref 65–105)
GLUCOSE BLD-MCNC: 175 MG/DL (ref 70–99)
GLUCOSE BLD-MCNC: 256 MG/DL (ref 65–105)
HCT VFR BLD CALC: 28.3 % (ref 36.3–47.1)
HEMOGLOBIN: 9.2 G/DL (ref 11.9–15.1)
IMMATURE GRANULOCYTES: 1 %
LYMPHOCYTES # BLD: 19 % (ref 24–43)
MAGNESIUM: 1.6 MG/DL (ref 1.6–2.6)
MCH RBC QN AUTO: 31.5 PG (ref 25.2–33.5)
MCHC RBC AUTO-ENTMCNC: 32.5 G/DL (ref 28.4–34.8)
MCV RBC AUTO: 96.9 FL (ref 82.6–102.9)
MODE: ABNORMAL
MONOCYTES # BLD: 6 % (ref 3–12)
NEGATIVE BASE EXCESS, ART: 2 (ref 0–2)
NRBC AUTOMATED: 0 PER 100 WBC
O2 DEVICE/FLOW/%: ABNORMAL
PATIENT TEMP: ABNORMAL
PDW BLD-RTO: 14.5 % (ref 11.8–14.4)
PHOSPHORUS: 1.5 MG/DL (ref 2.6–4.5)
PLATELET # BLD: 206 K/UL (ref 138–453)
PLATELET ESTIMATE: ABNORMAL
PMV BLD AUTO: 10.3 FL (ref 8.1–13.5)
POC HCO3: 21 MMOL/L (ref 21–28)
POC O2 SATURATION: 98 % (ref 94–98)
POC PCO2 TEMP: ABNORMAL MM HG
POC PCO2: 29.5 MM HG (ref 35–48)
POC PH TEMP: ABNORMAL
POC PH: 7.46 (ref 7.35–7.45)
POC PO2 TEMP: ABNORMAL MM HG
POC PO2: 96.3 MM HG (ref 83–108)
POSITIVE BASE EXCESS, ART: ABNORMAL (ref 0–3)
POTASSIUM SERPL-SCNC: 3.4 MMOL/L (ref 3.7–5.3)
POTASSIUM SERPL-SCNC: 3.6 MMOL/L (ref 3.7–5.3)
POTASSIUM SERPL-SCNC: 3.8 MMOL/L (ref 3.7–5.3)
RBC # BLD: 2.92 M/UL (ref 3.95–5.11)
RBC # BLD: ABNORMAL 10*6/UL
SAMPLE SITE: ABNORMAL
SEG NEUTROPHILS: 73 % (ref 36–65)
SEGMENTED NEUTROPHILS ABSOLUTE COUNT: 6.02 K/UL (ref 1.5–8.1)
SEND OUT REPORT: NORMAL
SERUM OSMOLALITY: 310 MOSM/KG (ref 275–295)
SODIUM BLD-SCNC: 148 MMOL/L (ref 135–144)
SODIUM BLD-SCNC: 148 MMOL/L (ref 135–144)
SODIUM BLD-SCNC: 149 MMOL/L (ref 135–144)
SODIUM BLD-SCNC: 151 MMOL/L (ref 135–144)
TCO2 (CALC), ART: 22 MMOL/L (ref 22–29)
TEST NAME: NORMAL
WBC # BLD: 8.2 K/UL (ref 3.5–11.3)
WBC # BLD: ABNORMAL 10*3/UL

## 2018-09-06 PROCEDURE — 82947 ASSAY GLUCOSE BLOOD QUANT: CPT

## 2018-09-06 PROCEDURE — 6370000000 HC RX 637 (ALT 250 FOR IP): Performed by: NEUROLOGICAL SURGERY

## 2018-09-06 PROCEDURE — 6370000000 HC RX 637 (ALT 250 FOR IP): Performed by: FAMILY MEDICINE

## 2018-09-06 PROCEDURE — 95951 PR EEG MONITORING/VIDEORECORD: CPT | Performed by: PSYCHIATRY & NEUROLOGY

## 2018-09-06 PROCEDURE — 94003 VENT MGMT INPAT SUBQ DAY: CPT

## 2018-09-06 PROCEDURE — 83930 ASSAY OF BLOOD OSMOLALITY: CPT

## 2018-09-06 PROCEDURE — 80048 BASIC METABOLIC PNL TOTAL CA: CPT

## 2018-09-06 PROCEDURE — 6370000000 HC RX 637 (ALT 250 FOR IP): Performed by: EMERGENCY MEDICINE

## 2018-09-06 PROCEDURE — 82803 BLOOD GASES ANY COMBINATION: CPT

## 2018-09-06 PROCEDURE — 6360000002 HC RX W HCPCS: Performed by: EMERGENCY MEDICINE

## 2018-09-06 PROCEDURE — 2580000003 HC RX 258: Performed by: FAMILY MEDICINE

## 2018-09-06 PROCEDURE — 84295 ASSAY OF SERUM SODIUM: CPT

## 2018-09-06 PROCEDURE — 36415 COLL VENOUS BLD VENIPUNCTURE: CPT

## 2018-09-06 PROCEDURE — 95951 HC EEG MONITORING VIDEO RECORDING: CPT

## 2018-09-06 PROCEDURE — 82330 ASSAY OF CALCIUM: CPT

## 2018-09-06 PROCEDURE — 6360000002 HC RX W HCPCS: Performed by: NURSE PRACTITIONER

## 2018-09-06 PROCEDURE — 94762 N-INVAS EAR/PLS OXIMTRY CONT: CPT

## 2018-09-06 PROCEDURE — 6370000000 HC RX 637 (ALT 250 FOR IP): Performed by: STUDENT IN AN ORGANIZED HEALTH CARE EDUCATION/TRAINING PROGRAM

## 2018-09-06 PROCEDURE — 2000000003 HC NEURO ICU R&B

## 2018-09-06 PROCEDURE — 84132 ASSAY OF SERUM POTASSIUM: CPT

## 2018-09-06 PROCEDURE — 2500000003 HC RX 250 WO HCPCS: Performed by: FAMILY MEDICINE

## 2018-09-06 PROCEDURE — 85025 COMPLETE CBC W/AUTO DIFF WBC: CPT

## 2018-09-06 PROCEDURE — 6360000002 HC RX W HCPCS: Performed by: STUDENT IN AN ORGANIZED HEALTH CARE EDUCATION/TRAINING PROGRAM

## 2018-09-06 PROCEDURE — 83735 ASSAY OF MAGNESIUM: CPT

## 2018-09-06 PROCEDURE — 94770 HC ETCO2 MONITOR DAILY: CPT

## 2018-09-06 PROCEDURE — 84100 ASSAY OF PHOSPHORUS: CPT

## 2018-09-06 PROCEDURE — 36600 WITHDRAWAL OF ARTERIAL BLOOD: CPT

## 2018-09-06 PROCEDURE — 71045 X-RAY EXAM CHEST 1 VIEW: CPT

## 2018-09-06 PROCEDURE — 99291 CRITICAL CARE FIRST HOUR: CPT | Performed by: PSYCHIATRY & NEUROLOGY

## 2018-09-06 RX ADMIN — ASPIRIN 81 MG: 81 TABLET, CHEWABLE ORAL at 08:37

## 2018-09-06 RX ADMIN — PROPRANOLOL HYDROCHLORIDE 40 MG: 40 TABLET ORAL at 08:37

## 2018-09-06 RX ADMIN — PROPRANOLOL HYDROCHLORIDE 40 MG: 40 TABLET ORAL at 21:15

## 2018-09-06 RX ADMIN — MAGNESIUM SULFATE IN DEXTROSE 1 G: 10 INJECTION, SOLUTION INTRAVENOUS at 08:38

## 2018-09-06 RX ADMIN — POTASSIUM CHLORIDE 20 MEQ: 400 INJECTION, SOLUTION INTRAVENOUS at 13:29

## 2018-09-06 RX ADMIN — DESMOPRESSIN ACETATE 40 MG: 0.2 TABLET ORAL at 21:15

## 2018-09-06 RX ADMIN — SODIUM CHLORIDE 25 ML/HR: 3 INJECTION, SOLUTION INTRAVENOUS at 10:03

## 2018-09-06 RX ADMIN — LEVETIRACETAM 1000 MG: 10 INJECTION INTRAVENOUS at 13:29

## 2018-09-06 RX ADMIN — ENOXAPARIN SODIUM 40 MG: 40 INJECTION SUBCUTANEOUS at 08:37

## 2018-09-06 RX ADMIN — MAGNESIUM SULFATE IN DEXTROSE 1 G: 10 INJECTION, SOLUTION INTRAVENOUS at 10:01

## 2018-09-06 RX ADMIN — INSULIN LISPRO 1 UNITS: 100 INJECTION, SOLUTION INTRAVENOUS; SUBCUTANEOUS at 21:15

## 2018-09-06 RX ADMIN — FAMOTIDINE 20 MG: 20 TABLET, FILM COATED ORAL at 21:15

## 2018-09-06 RX ADMIN — INSULIN LISPRO 1 UNITS: 100 INJECTION, SOLUTION INTRAVENOUS; SUBCUTANEOUS at 08:38

## 2018-09-06 RX ADMIN — FAMOTIDINE 20 MG: 20 TABLET, FILM COATED ORAL at 08:37

## 2018-09-06 RX ADMIN — POTASSIUM PHOSPHATE, MONOBASIC AND POTASSIUM PHOSPHATE, DIBASIC 10 MMOL: 224; 236 INJECTION, SOLUTION, CONCENTRATE INTRAVENOUS at 10:01

## 2018-09-06 RX ADMIN — POTASSIUM CHLORIDE 20 MEQ: 400 INJECTION, SOLUTION INTRAVENOUS at 11:38

## 2018-09-06 RX ADMIN — INSULIN LISPRO 3 UNITS: 100 INJECTION, SOLUTION INTRAVENOUS; SUBCUTANEOUS at 13:30

## 2018-09-06 RX ADMIN — LEVETIRACETAM 1000 MG: 10 INJECTION INTRAVENOUS at 01:14

## 2018-09-06 ASSESSMENT — PULMONARY FUNCTION TESTS
PIF_VALUE: 18
PIF_VALUE: 18
PIF_VALUE: 23
PIF_VALUE: 19
PIF_VALUE: 18
PIF_VALUE: 18
PIF_VALUE: 17

## 2018-09-06 NOTE — PLAN OF CARE
Problem: Risk for Impaired Skin Integrity  Goal: Tissue integrity - skin and mucous membranes  Structural intactness and normal physiological function of skin and  mucous membranes. Outcome: Ongoing  Skin assessment complete. Interventions in place. Pt turned q 2 hours.  Will continue to monitor for additional needs and skin breakdown

## 2018-09-06 NOTE — PLAN OF CARE
Problem: SKIN INTEGRITY  Goal: Skin integrity is maintained or improved  Outcome: Met This Shift  Skin assessed for new breakdown and redness, patient turned every 2 hours, preventative mepilex remains in place, heels and elbows elevated off of bed on pillows. Will continue to monitor.

## 2018-09-06 NOTE — PROCEDURES
[address] [place] [address], [facility]      CONTINUOUS VIDEO ELECTROENCEPHALOGRAM REPORT        REFERRING PHYSICIAN:  Geo Scherer MD  PATIENT NAME:Michelle Jordan  PATIENT MRN: [id number]  DATE OF EE2018 from 8:56 AM to 13:37; from 18: to 2018 at 8:56AM    BRIEF HISTORY:  56year old female with history of last left MCA stroke presented with AMS, was found to have new large right MCV ischemic infarct. EEG to evaluate. CURRENT ANTI-EPILEPTIC MEDICATIONS: Keppra 1000mg BID; Ativan PRN;     EEG DESCRIPTION:   The background consisted of continuous slow, generalized, in delta and theta frequency of 2-7 Hz, predominantly delta activity in 2-4Hz with suppression on left temporal/parietal regions. No posterior dominant rhythm. There were rare eye opening/closing. There was reactivity to stimulation and spontaneous variation. There was no epileptiform discharges or EEG seizures seen. Photic stimulation and hyperventilation were not  performed. Heart rate was regular at ~60s-70s per minute on a single lead EKG with rare arrhythmia. CLASSIFICATION:  Abnormal significance III (Coma)  1. Continuous slow, generalized   3. Asymmetric, attenuation on left temporal/parietal      IMPRESSION:  Recording from 2018 from 8:56 AM to 13:37 and from 18: to 2018 at 8:56AM showed severe diffuse encephalopathy and severe bilateral structural abnormalities, worse on the left, consistent with patient's history of bi-hemispheric large ischemic strokes. No epileptiform discharges or EEG seizures were seen. Alaina Shore MD, [address] [place] [address] Neurology    Extended recording from 8:56AM to 11:41 AM on 2018 was reviewed, no additional findings.      Alaina Shore MD, MS

## 2018-09-06 NOTE — PROGRESS NOTES
Physical Therapy  DATE: 2018    NAME: Tim Rodriguez  MRN: [id number]   : 1960    Patient not seen this date for Physical Therapy due to:  [] Blood transfusion in progress  [] Hemodialysis  []  Patient Declined  [] Spine Precautions   [] Strict Bedrest  [] Surgery/ Procedure  [] Testing      [x] Other Intubated. LTME. Poor prognosis. [] PT being discontinued at this time. Patient independent. No further needs. [] PT being discontinued at this time as the patient has been transferred to palliative care. No further needs.     Sharon Loya, PT

## 2018-09-06 NOTE — PROGRESS NOTES
Nutrition Assessment    Type and Reason for Visit: Reassess    Nutrition Recommendations: Recommend Vital AF (semielemental ) tube feed goal of 40 ml per hour to provide 1152 kcal, 72 g protein to see if stools decrease with more elemental formula. Malnutrition Assessment:  · Malnutrition Status: At risk for malnutrition  · Context: Chronic illness  · Findings of the 6 clinical characteristics of malnutrition (Minimum of 2 out of 6 clinical characteristics is required to make the diagnosis of moderate or severe Protein Calorie Malnutrition based on AND/ASPEN Guidelines):  1. Energy Intake-Not available, not able to assess    2. Weight Loss-No significant weight loss, unable to assess  3. Muscle Loss-Severe muscle mass loss, Temples (temporalis muscle)  4. Fluid Accumulation-Mild fluid accumulation, Extremities  5.  Strength-Not measured    Nutrition Diagnosis:   · Problem: Inadequate oral intake  · Etiology: related to Impaired respiratory function-inability to consume food     Signs and symptoms:  as evidenced by NPO status due to medical condition (need for nutrition support-EN)    Nutrition Assessment:  · Subjective Assessment: Tube feed at goal. Vent continues.  Nursing requests change of tube feed due to frequent stools (4 BM's per shift)  · Current Nutrition Therapies:  · Oral Diet Orders: NPO   · Tube Feeding (TF) Orders:   · Formula: Diabetic  · Rate (ml/hr):40 ml per hour    · Volume (ml/day): 960 ml  · Duration: Continuous 24hrs  · Goal TF & Flush Orders Provides: 1152 kcal, 58 g protein  · Anthropometric Measures:  · Ht: 5' (152.4 cm)   · Current Body Wt: 96 lb (43.5 kg)  · Ideal Body Wt: 100 lb (45.4 kg), % Ideal Body 96%  · BMI Classification: BMI 18.5 - 24.9 Normal Weight  · Comparative Standards (Estimated Nutrition Needs):  · Estimated Daily Total Kcal: 25-28 kcal/tk=2042-6239 kcal  · Estimated Daily Protein (g): 60-65 g or more    Estimated Intake vs Estimated Needs: Intake Meets Needs    Nutrition Risk Level: Moderate    Nutrition Interventions:   Change tube feed  Continued Inpatient Monitoring, Education Not Indicated    Nutrition Evaluation:   · Evaluation: Goal achieved   · Goals: meet % of estimated nutrition needs    · Monitoring: TF Intake, TF Tolerance, Diarrhea    See Adult Nutrition Doc Flowsheet for more detail.      Electronically signed by Amado Ngo RD, MAYKEL on 9/6/18 at 12:25 PM    Contact Number: [phone]

## 2018-09-06 NOTE — PROGRESS NOTES
Very poor oral hygiene noted while doing mouth care. Small amount of dried blood around lips. Cleaned with no new blood noted.

## 2018-09-06 NOTE — PROGRESS NOTES
Resident Progress Note  Date: 2018  Time: 10:57 AM  Patient Name: Ruthie Andrade  Patient : 1960  Room/Bed: [address] [place] [address]  Allergies: No Known Allergies    Interval History:    No acute events overnight. No noted episodes of seizure. He son Anthony Magdaleno was reached out but he was told by his father to not be in contact with his mother. The son also mentioned about his sister whom he had not talked to for about 8 years. Daughter [facility] was contacted and she will be coming here. Current Medications:  Scheduled Meds:   potassium phosphate IVPB  10 mmol Intravenous Once    insulin lispro  0-6 Units Subcutaneous 4x Daily AC & HS    famotidine  20 mg Oral BID    levetiracetam  1,000 mg Intravenous Q12H    propranolol  40 mg Oral BID    sodium chloride flush  10 mL Intravenous 2 times per day    enoxaparin  40 mg Subcutaneous Daily    atorvastatin  40 mg Per NG tube Nightly    aspirin  81 mg Per NG tube Daily       Continuous Infusions:   sodium chloride 50 mL/hr at 18 1754       Vitals:  Patient Vitals for the past 8 hrs:   BP Temp Temp src Pulse Resp SpO2   18 0921 - - - 77 22 100 %   18 0900 (!) 172/84 - - 62 20 100 %   18 0800 (!) 171/90 - - 77 23 100 %   18 0729 - 99 °F (37.2 °C) Oral - - -   18 0700 (!) 165/75 - - 66 20 100 %   18 0605 (!) 156/108 - - 94 (!) 31 100 %   18 0505 (!) 181/96 - - 72 23 100 %   18 0400 (!) 168/93 99.1 °F (37.3 °C) Oral 66 22 100 %   18 0328 - - - 58 18 100 %   18 0300 (!) 162/87 - - 64 19 100 %     Height and Weight  Height: 5' (152.4 cm)  Weight: 96 lb 9 oz (43.8 kg)  Weight Method: Estimated  BSA (Calculated - sq m): 0.92 sq meters  BMI (Calculated): 18.9  Body mass index is 18.86 kg/m². <16 Severe malnutrition  16-16.99 Moderate malnutrition  17-18.49 Mild malnutrition  18.5-24.9 Normal  25-29.9 Overweight (not obese)  30-34.9 Obese class 1 (Low Risk)  35-39.9 Obese class 2 (Moderate Risk)  ? 36 Obese class 3 (High Risk)    Labs (last 48 hours):  Recent Results (from the past 48 hour(s))   OSMOLALITY    Collection Time: 09/04/18 11:20 AM   Result Value Ref Range    Serum Osmolality 297 (H) 275 - 295 mOsm/kg   Sodium Lab    Collection Time: 09/04/18 11:20 AM   Result Value Ref Range    Sodium 146 (H) 135 - 144 mmol/L   POC Glucose Fingerstick    Collection Time: 09/04/18 12:31 PM   Result Value Ref Range    POC Glucose 132 (H) 65 - 105 mg/dL   OSMOLALITY    Collection Time: 09/04/18  3:10 PM   Result Value Ref Range    Serum Osmolality 297 (H) 275 - 295 mOsm/kg   POC Glucose Fingerstick    Collection Time: 09/04/18  4:24 PM   Result Value Ref Range    POC Glucose 105 65 - 105 mg/dL   Sodium Lab    Collection Time: 09/04/18  5:56 PM   Result Value Ref Range    Sodium 145 (H) 135 - 144 mmol/L   POC Glucose Fingerstick    Collection Time: 09/04/18  8:06 PM   Result Value Ref Range    POC Glucose 97 65 - 105 mg/dL   OSMOLALITY    Collection Time: 09/04/18  8:10 PM   Result Value Ref Range    Serum Osmolality 292 275 - 295 mOsm/kg   OSMOLALITY    Collection Time: 09/04/18 11:50 PM   Result Value Ref Range    Serum Osmolality 303 (H) 275 - 295 mOsm/kg   SODIUM    Collection Time: 09/04/18 11:50 PM   Result Value Ref Range    Sodium 149 (H) 135 - 144 mmol/L   Arterial Blood Gas, POC    Collection Time: 09/05/18  3:21 AM   Result Value Ref Range    POC pH 7.509 (H) 7.350 - 7.450    POC pCO2 29.0 (L) 35.0 - 48.0 mm Hg    POC PO2 101.8 83.0 - 108.0 mm Hg    POC HCO3 23.1 21.0 - 28.0 mmol/L    TCO2 (calc), Art 24 22.0 - 29.0 mmol/L    Negative Base Excess, Art NOT REPORTED 0.0 - 2.0    Positive Base Excess, Art 1 0.0 - 3.0    POC O2 SAT 98 94.0 - 98.0 %    O2 Device/Flow/% Adult Ventilator     Edilberto Test POSITIVE     Sample Site Left Radial Artery     Mode PRVC     FIO2 30.0     Pt Temp 37.6     POC pH Temp 7.50     POC pCO2 Temp 30 mm Hg    POC pO2 Temp 106 mm Hg   OSMOLALITY    Collection Time: 09/05/18  4:02 AM Result Value Ref Range    Serum Osmolality 297 (H) 275 - 295 mOsm/kg   Basic Metabolic Panel    Collection Time: 09/05/18  5:52 AM   Result Value Ref Range    Glucose 139 (H) 70 - 99 mg/dL    BUN 2 (L) 6 - 20 mg/dL    CREATININE 0.41 (L) 0.50 - 0.90 mg/dL    Bun/Cre Ratio NOT REPORTED 9 - 20    Calcium 7.6 (L) 8.6 - 10.4 mg/dL    Sodium 149 (H) 135 - 144 mmol/L    Potassium 3.0 (L) 3.7 - 5.3 mmol/L    Chloride 115 (H) 98 - 107 mmol/L    CO2 23 20 - 31 mmol/L    Anion Gap 11 9 - 17 mmol/L    GFR Non-African American >60 >60 mL/min    GFR African American >60 >60 mL/min    GFR Comment          GFR Staging NOT REPORTED    CALCIUM, IONIZED    Collection Time: 09/05/18  5:52 AM   Result Value Ref Range    Calcium, Ion 1.08 (L) 1.13 - 1.33 mmol/L   CBC Auto Differential    Collection Time: 09/05/18  5:52 AM   Result Value Ref Range    WBC 10.0 3.5 - 11.3 k/uL    RBC 3.35 (L) 3.95 - 5.11 m/uL    Hemoglobin 10.6 (L) 11.9 - 15.1 g/dL    Hematocrit 32.0 (L) 36.3 - 47.1 %    MCV 95.5 82.6 - 102.9 fL    MCH 31.6 25.2 - 33.5 pg    MCHC 33.1 28.4 - 34.8 g/dL    RDW 14.1 11.8 - 14.4 %    Platelets 983 597 - 919 k/uL    MPV 10.3 8.1 - 13.5 fL    NRBC Automated 0.0 0.0 per 100 WBC    Differential Type NOT REPORTED     Seg Neutrophils 73 (H) 36 - 65 %    Lymphocytes 18 (L) 24 - 43 %    Monocytes 7 3 - 12 %    Eosinophils % 1 1 - 4 %    Basophils 0 0 - 2 %    Immature Granulocytes 1 (H) 0 %    Segs Absolute 7.38 1.50 - 8.10 k/uL    Absolute Lymph # 1.78 1.10 - 3.70 k/uL    Absolute Mono # 0.66 0.10 - 1.20 k/uL    Absolute Eos # 0.13 0.00 - 0.44 k/uL    Basophils # 0.04 0.00 - 0.20 k/uL    Absolute Immature Granulocyte 0.05 0.00 - 0.30 k/uL    WBC Morphology NOT REPORTED     RBC Morphology NOT REPORTED     Platelet Estimate NOT REPORTED    Magnesium    Collection Time: 09/05/18  5:52 AM   Result Value Ref Range    Magnesium 1.7 1.6 - 2.6 mg/dL   PHOSPHORUS    Collection Time: 09/05/18  5:52 AM   Result Value Ref Range    Phosphorus Glucose 175 (H) 70 - 99 mg/dL    BUN 4 (L) 6 - 20 mg/dL    CREATININE 0.40 (L) 0.50 - 0.90 mg/dL    Bun/Cre Ratio NOT REPORTED 9 - 20    Calcium 7.9 (L) 8.6 - 10.4 mg/dL    Sodium 151 (H) 135 - 144 mmol/L    Potassium 3.4 (L) 3.7 - 5.3 mmol/L    Chloride 121 (H) 98 - 107 mmol/L    CO2 21 20 - 31 mmol/L    Anion Gap 9 9 - 17 mmol/L    GFR Non-African American >60 >60 mL/min    GFR African American >60 >60 mL/min    GFR Comment          GFR Staging NOT REPORTED    CALCIUM, IONIZED    Collection Time: 09/06/18  5:46 AM   Result Value Ref Range    Calcium, Ion 1.13 1.13 - 1.33 mmol/L   CBC Auto Differential    Collection Time: 09/06/18  5:46 AM   Result Value Ref Range    WBC 8.2 3.5 - 11.3 k/uL    RBC 2.92 (L) 3.95 - 5.11 m/uL    Hemoglobin 9.2 (L) 11.9 - 15.1 g/dL    Hematocrit 28.3 (L) 36.3 - 47.1 %    MCV 96.9 82.6 - 102.9 fL    MCH 31.5 25.2 - 33.5 pg    MCHC 32.5 28.4 - 34.8 g/dL    RDW 14.5 (H) 11.8 - 14.4 %    Platelets 118 360 - 817 k/uL    MPV 10.3 8.1 - 13.5 fL    NRBC Automated 0.0 0.0 per 100 WBC    Differential Type NOT REPORTED     Seg Neutrophils 73 (H) 36 - 65 %    Lymphocytes 19 (L) 24 - 43 %    Monocytes 6 3 - 12 %    Eosinophils % 1 1 - 4 %    Basophils 0 0 - 2 %    Immature Granulocytes 1 (H) 0 %    Segs Absolute 6.02 1.50 - 8.10 k/uL    Absolute Lymph # 1.52 1.10 - 3.70 k/uL    Absolute Mono # 0.51 0.10 - 1.20 k/uL    Absolute Eos # 0.11 0.00 - 0.44 k/uL    Basophils # <0.03 0.00 - 0.20 k/uL    Absolute Immature Granulocyte 0.05 0.00 - 0.30 k/uL    WBC Morphology NOT REPORTED     RBC Morphology ANISOCYTOSIS PRESENT     Platelet Estimate NOT REPORTED    Magnesium    Collection Time: 09/06/18  5:46 AM   Result Value Ref Range    Magnesium 1.6 1.6 - 2.6 mg/dL   PHOSPHORUS    Collection Time: 09/06/18  5:46 AM   Result Value Ref Range    Phosphorus 1.5 (L) 2.6 - 4.5 mg/dL   OSMOLALITY    Collection Time: 09/06/18  5:46 AM   Result Value Ref Range    Serum Osmolality 310 (H) 275 - 295 mOsm/kg   POC Glucose Fingerstick    Collection Time: 09/06/18  7:31 AM   Result Value Ref Range    POC Glucose 167 (H) 65 - 105 mg/dL       RADIOLOGY:  XR CHEST PORTABLE   Final Result   Satisfactory endotracheal and enteric tube positions. No focal airspace   disease. CT HEAD WO CONTRAST   Final Result   Evolution of subacute large territory right MCA infarct without acute blood   products identified. Cerebral edema appears more prominent in the interval   with little interval change in midline shift. Chronic findings as above. XR CHEST PORTABLE   Final Result   1. Endotracheal tube terminates 4.5 cm from the lucio. 2. Enteric tube with proximal side-port in the distal esophagus. Advancement   by 5 cm should be considered. MRI brain without contrast   Final Result   Large acute infarct in the right MCA distribution with midline shift and mass   effect as described above. Midline shift is not changed compared to   prior/recent CT head      Old large MCA infarct on the left      Multiple smaller additional old infarcts are noted as well         XR ABDOMEN (KUB) (SINGLE AP VIEW)   Final Result   Support lines and tubes as above. No evidence of bowel obstruction. XR CHEST PORTABLE   Final Result   1. Endotracheal tube with distal tip approximately 7.5 cm above the lucio. Enteric tube in appropriate position. 2. No evidence of acute intrathoracic process. 3. COPD. CTA NECK W CONTRAST   Final Result   Occlusion at the proximal aspect of the M1 segment of the right MCA, likely   contributing to known acute large right MCA territory infarction. Thrombus at the proximal aspect of the right subclavian artery, contributing   to near occlusion.       Hypoplastic right vertebral artery, with minimal contrast opacification in   the V2 segment and nonvisualized V1 segment, stable since July 16, 2015.      50% focal stenosis in the left cavernous internal carotid artery secondary Sensory function Intact to pain     Cerebellar  No involuntary movements or tremors         Gait                  Not tested             NUTRITION:   Continuous tube feeds    DRAINS:   There are no drains to monitor at this time. DVT PROPHYLAXIS:  SCD sleeves  Thigh High ROBBIE Stockings  No anticoagulation at this time. ASSESSMENT/PLAN:    NEUROLOGIC:  -Right-sided MCA infarct  -Neuro exam completed off of propofol, no distress. failed weaning  -3% saline to be discontinued  -Target sodium 145-150 range  -LTME to be discontinued  -Aspirin 81 mg and lipitor 40 mg  -Neuro checks  -Keppra was  increased to 1 g twice a day yesterday. continue keppra 1 g BID.  -discontinue CIWA  -Palliative care on board-appreciate their input    CARDIOVASCULAR:  -Propanolol for blood pressure and withdrawal symptoms  -closer to discharge will switch back to toprol.  -Target SBP<180, stable  -Cardiology consulted  -Echo showing 30% left ventricular ejection fraction with diastolic dysfunction  -Cardiology signed off due to neurological status.     PULMONARY:  -intubated  -failed weaning  -daily blood gases    RENAL/FLUID/ELECTROLYTE:  -target sodium between 145-150  -daily bmps  -3% was discontinued    GI/NUTRITION:  NUTRITION:  DIET TUBE FEED CONTINUOUS/CYCLIC NPO; Diabetic; 10; 40; 24  - continuous tube feeds initiated    ID:  -Antibiotics were discontinued yesterday  -Urine culture negative, blood culture negative ×3 days    HEMATOLOGIC:  -H&H stable    ENDOCRINE:    - monitor blood glucose  - insulin therapy -  continue    OTHER:  -Palliative consult to determine power of , determine goals of care and closest approximation to patient's wishes    PROPHYLAXIS:   Stress ulcer: pepcid 20  bid   VTE: lovenox 40 mg    DISPOSITION:   Continue ICU        Ruel Thakkar MD,PGY-2  Neuro Critical Care  Pager [phone]  9/6/2018  10:57 AM

## 2018-09-07 LAB
ABSOLUTE EOS #: 0.16 K/UL (ref 0–0.44)
ABSOLUTE IMMATURE GRANULOCYTE: 0.17 K/UL (ref 0–0.3)
ABSOLUTE LYMPH #: 2.3 K/UL (ref 1.1–3.7)
ABSOLUTE MONO #: 0.72 K/UL (ref 0.1–1.2)
ALLEN TEST: ABNORMAL
ANION GAP SERPL CALCULATED.3IONS-SCNC: 14 MMOL/L (ref 9–17)
BASOPHILS # BLD: 0 % (ref 0–2)
BASOPHILS ABSOLUTE: 0.03 K/UL (ref 0–0.2)
BUN BLDV-MCNC: 8 MG/DL (ref 6–20)
BUN/CREAT BLD: ABNORMAL (ref 9–20)
CALCIUM IONIZED: 1.18 MMOL/L (ref 1.13–1.33)
CALCIUM SERPL-MCNC: 8.3 MG/DL (ref 8.6–10.4)
CHLORIDE BLD-SCNC: 112 MMOL/L (ref 98–107)
CO2: 22 MMOL/L (ref 20–31)
CREAT SERPL-MCNC: 0.38 MG/DL (ref 0.5–0.9)
DIFFERENTIAL TYPE: ABNORMAL
EOSINOPHILS RELATIVE PERCENT: 1 % (ref 1–4)
FIO2: 30
GFR AFRICAN AMERICAN: >60 ML/MIN
GFR NON-AFRICAN AMERICAN: >60 ML/MIN
GFR SERPL CREATININE-BSD FRML MDRD: ABNORMAL ML/MIN/{1.73_M2}
GFR SERPL CREATININE-BSD FRML MDRD: ABNORMAL ML/MIN/{1.73_M2}
GLUCOSE BLD-MCNC: 157 MG/DL (ref 65–105)
GLUCOSE BLD-MCNC: 158 MG/DL (ref 70–99)
HCT VFR BLD CALC: 30.3 % (ref 36.3–47.1)
HEMOGLOBIN: 9.8 G/DL (ref 11.9–15.1)
IMMATURE GRANULOCYTES: 1 %
LYMPHOCYTES # BLD: 18 % (ref 24–43)
MAGNESIUM: 1.9 MG/DL (ref 1.6–2.6)
MCH RBC QN AUTO: 31.4 PG (ref 25.2–33.5)
MCHC RBC AUTO-ENTMCNC: 32.3 G/DL (ref 28.4–34.8)
MCV RBC AUTO: 97.1 FL (ref 82.6–102.9)
MODE: ABNORMAL
MONOCYTES # BLD: 6 % (ref 3–12)
NEGATIVE BASE EXCESS, ART: ABNORMAL (ref 0–2)
NRBC AUTOMATED: 0 PER 100 WBC
O2 DEVICE/FLOW/%: ABNORMAL
PATIENT TEMP: ABNORMAL
PDW BLD-RTO: 14.6 % (ref 11.8–14.4)
PHOSPHORUS: 2.1 MG/DL (ref 2.6–4.5)
PLATELET # BLD: 218 K/UL (ref 138–453)
PLATELET ESTIMATE: ABNORMAL
PMV BLD AUTO: 10.4 FL (ref 8.1–13.5)
POC HCO3: 22.6 MMOL/L (ref 21–28)
POC O2 SATURATION: 96 % (ref 94–98)
POC PCO2 TEMP: ABNORMAL MM HG
POC PCO2: 29.2 MM HG (ref 35–48)
POC PH TEMP: ABNORMAL
POC PH: 7.5 (ref 7.35–7.45)
POC PO2 TEMP: ABNORMAL MM HG
POC PO2: 74.5 MM HG (ref 83–108)
POSITIVE BASE EXCESS, ART: 0 (ref 0–3)
POTASSIUM SERPL-SCNC: 3.4 MMOL/L (ref 3.7–5.3)
RBC # BLD: 3.12 M/UL (ref 3.95–5.11)
RBC # BLD: ABNORMAL 10*6/UL
SAMPLE SITE: ABNORMAL
SEG NEUTROPHILS: 74 % (ref 36–65)
SEGMENTED NEUTROPHILS ABSOLUTE COUNT: 9.59 K/UL (ref 1.5–8.1)
SODIUM BLD-SCNC: 148 MMOL/L (ref 135–144)
TCO2 (CALC), ART: 24 MMOL/L (ref 22–29)
WBC # BLD: 13 K/UL (ref 3.5–11.3)
WBC # BLD: ABNORMAL 10*3/UL

## 2018-09-07 PROCEDURE — 94770 HC ETCO2 MONITOR DAILY: CPT

## 2018-09-07 PROCEDURE — 83735 ASSAY OF MAGNESIUM: CPT

## 2018-09-07 PROCEDURE — 6370000000 HC RX 637 (ALT 250 FOR IP): Performed by: EMERGENCY MEDICINE

## 2018-09-07 PROCEDURE — 6370000000 HC RX 637 (ALT 250 FOR IP): Performed by: NEUROLOGICAL SURGERY

## 2018-09-07 PROCEDURE — 82803 BLOOD GASES ANY COMBINATION: CPT

## 2018-09-07 PROCEDURE — 1200000000 HC SEMI PRIVATE

## 2018-09-07 PROCEDURE — 84100 ASSAY OF PHOSPHORUS: CPT

## 2018-09-07 PROCEDURE — 6360000002 HC RX W HCPCS: Performed by: STUDENT IN AN ORGANIZED HEALTH CARE EDUCATION/TRAINING PROGRAM

## 2018-09-07 PROCEDURE — 6370000000 HC RX 637 (ALT 250 FOR IP): Performed by: FAMILY MEDICINE

## 2018-09-07 PROCEDURE — 82947 ASSAY GLUCOSE BLOOD QUANT: CPT

## 2018-09-07 PROCEDURE — 6360000002 HC RX W HCPCS: Performed by: FAMILY MEDICINE

## 2018-09-07 PROCEDURE — 94003 VENT MGMT INPAT SUBQ DAY: CPT

## 2018-09-07 PROCEDURE — 85025 COMPLETE CBC W/AUTO DIFF WBC: CPT

## 2018-09-07 PROCEDURE — 2500000003 HC RX 250 WO HCPCS: Performed by: FAMILY MEDICINE

## 2018-09-07 PROCEDURE — 6370000000 HC RX 637 (ALT 250 FOR IP): Performed by: STUDENT IN AN ORGANIZED HEALTH CARE EDUCATION/TRAINING PROGRAM

## 2018-09-07 PROCEDURE — 99497 ADVNCD CARE PLAN 30 MIN: CPT | Performed by: FAMILY MEDICINE

## 2018-09-07 PROCEDURE — 36415 COLL VENOUS BLD VENIPUNCTURE: CPT

## 2018-09-07 PROCEDURE — 80048 BASIC METABOLIC PNL TOTAL CA: CPT

## 2018-09-07 PROCEDURE — 6360000002 HC RX W HCPCS: Performed by: EMERGENCY MEDICINE

## 2018-09-07 PROCEDURE — 36600 WITHDRAWAL OF ARTERIAL BLOOD: CPT

## 2018-09-07 PROCEDURE — 76937 US GUIDE VASCULAR ACCESS: CPT

## 2018-09-07 PROCEDURE — 2580000003 HC RX 258: Performed by: FAMILY MEDICINE

## 2018-09-07 PROCEDURE — 99233 SBSQ HOSP IP/OBS HIGH 50: CPT | Performed by: PSYCHIATRY & NEUROLOGY

## 2018-09-07 PROCEDURE — 94762 N-INVAS EAR/PLS OXIMTRY CONT: CPT

## 2018-09-07 PROCEDURE — 82330 ASSAY OF CALCIUM: CPT

## 2018-09-07 RX ORDER — SODIUM CHLORIDE 0.9 % (FLUSH) 0.9 %
10 SYRINGE (ML) INJECTION PRN
Status: DISCONTINUED | OUTPATIENT
Start: 2018-09-07 | End: 2018-09-07

## 2018-09-07 RX ORDER — GLYCOPYRROLATE 0.2 MG/ML
0.1 INJECTION INTRAMUSCULAR; INTRAVENOUS EVERY 4 HOURS PRN
Status: DISCONTINUED | OUTPATIENT
Start: 2018-09-07 | End: 2018-09-09 | Stop reason: HOSPADM

## 2018-09-07 RX ORDER — LORAZEPAM 2 MG/ML
0.5 INJECTION INTRAMUSCULAR
Status: DISCONTINUED | OUTPATIENT
Start: 2018-09-07 | End: 2018-09-08

## 2018-09-07 RX ORDER — LIDOCAINE HYDROCHLORIDE 10 MG/ML
5 INJECTION, SOLUTION INFILTRATION; PERINEURAL ONCE
Status: DISCONTINUED | OUTPATIENT
Start: 2018-09-07 | End: 2018-09-07

## 2018-09-07 RX ORDER — MORPHINE SULFATE 4 MG/ML
4 INJECTION, SOLUTION INTRAMUSCULAR; INTRAVENOUS
Status: DISCONTINUED | OUTPATIENT
Start: 2018-09-07 | End: 2018-09-09 | Stop reason: HOSPADM

## 2018-09-07 RX ORDER — MORPHINE SULFATE 2 MG/ML
2 INJECTION, SOLUTION INTRAMUSCULAR; INTRAVENOUS
Status: DISCONTINUED | OUTPATIENT
Start: 2018-09-07 | End: 2018-09-09 | Stop reason: HOSPADM

## 2018-09-07 RX ORDER — SODIUM CHLORIDE 0.9 % (FLUSH) 0.9 %
10 SYRINGE (ML) INJECTION EVERY 12 HOURS SCHEDULED
Status: DISCONTINUED | OUTPATIENT
Start: 2018-09-07 | End: 2018-09-07

## 2018-09-07 RX ORDER — LORAZEPAM 2 MG/ML
1 INJECTION INTRAMUSCULAR
Status: DISCONTINUED | OUTPATIENT
Start: 2018-09-07 | End: 2018-09-09 | Stop reason: HOSPADM

## 2018-09-07 RX ADMIN — MORPHINE SULFATE 2 MG: 2 INJECTION, SOLUTION INTRAMUSCULAR; INTRAVENOUS at 23:52

## 2018-09-07 RX ADMIN — LORAZEPAM 1 MG: 2 INJECTION INTRAMUSCULAR; INTRAVENOUS at 20:42

## 2018-09-07 RX ADMIN — INSULIN LISPRO 1 UNITS: 100 INJECTION, SOLUTION INTRAVENOUS; SUBCUTANEOUS at 08:20

## 2018-09-07 RX ADMIN — MORPHINE SULFATE 2 MG: 2 INJECTION, SOLUTION INTRAMUSCULAR; INTRAVENOUS at 16:45

## 2018-09-07 RX ADMIN — LEVETIRACETAM 1000 MG: 10 INJECTION INTRAVENOUS at 13:41

## 2018-09-07 RX ADMIN — POTASSIUM PHOSPHATE, MONOBASIC AND POTASSIUM PHOSPHATE, DIBASIC 10 MMOL: 224; 236 INJECTION, SOLUTION, CONCENTRATE INTRAVENOUS at 11:17

## 2018-09-07 RX ADMIN — LORAZEPAM 1 MG: 2 INJECTION INTRAMUSCULAR; INTRAVENOUS at 16:45

## 2018-09-07 RX ADMIN — LEVETIRACETAM 1000 MG: 10 INJECTION INTRAVENOUS at 01:04

## 2018-09-07 RX ADMIN — PROPRANOLOL HYDROCHLORIDE 40 MG: 40 TABLET ORAL at 08:20

## 2018-09-07 RX ADMIN — LORAZEPAM 1 MG: 2 INJECTION INTRAMUSCULAR; INTRAVENOUS at 18:01

## 2018-09-07 RX ADMIN — FAMOTIDINE 20 MG: 20 TABLET, FILM COATED ORAL at 08:20

## 2018-09-07 RX ADMIN — MORPHINE SULFATE 4 MG: 4 INJECTION INTRAVENOUS at 18:01

## 2018-09-07 RX ADMIN — INSULIN LISPRO 1 UNITS: 100 INJECTION, SOLUTION INTRAVENOUS; SUBCUTANEOUS at 11:56

## 2018-09-07 RX ADMIN — ENOXAPARIN SODIUM 40 MG: 40 INJECTION SUBCUTANEOUS at 08:20

## 2018-09-07 RX ADMIN — ASPIRIN 81 MG: 81 TABLET, CHEWABLE ORAL at 08:20

## 2018-09-07 ASSESSMENT — PAIN SCALES - WONG BAKER
WONGBAKER_NUMERICALRESPONSE: 0

## 2018-09-07 ASSESSMENT — PULMONARY FUNCTION TESTS
PIF_VALUE: 20
PIF_VALUE: 19
PIF_VALUE: 29

## 2018-09-07 ASSESSMENT — PAIN SCALES - GENERAL
PAINLEVEL_OUTOF10: 0
PAINLEVEL_OUTOF10: 0

## 2018-09-07 NOTE — PROGRESS NOTES
Physical Therapy  DATE: 2018    NAME: Misti Tolentino  MRN: [id number]   : 1960    Patient not seen this date for Physical Therapy due to:  [] Blood transfusion in progress  [] Hemodialysis  []  Patient Declined  [] Spine Precautions   [] Strict Bedrest  [] Surgery/ Procedure  [] Testing      [x] Other- intubated, family meeting, possible palliative. PT will check back 9/10/18        [] PT being discontinued at this time. Patient independent. No further needs. [] PT being discontinued at this time as the patient has been transferred to palliative care. No further needs.     Charito Cortez, PT

## 2018-09-07 NOTE — PROGRESS NOTES
Dr Claudeen Mcmurray notified of 6 beat run of vtach. Patient immediately converted out into NSR. No new orders will continue to monitor.

## 2018-09-07 NOTE — FLOWSHEET NOTE
SPIRITUAL CARE DEPARTMENT - [facility] [address]  PROGRESS NOTE    Shift date: 9/7/18  Shift day: Friday   Shift # 2    Room # 4042/4734-56   Name: Blu Barajas            Age: 56 y.o. Gender: female          Bahai: Chris Valera 33 of Scientologist: Unknown    Referral: Extubation Response    Admit Date & Time: 9/2/2018  7:50 PM    PATIENT/EVENT DESCRIPTION:  Blu Barajas is a 56 y.o. female who arrived in hospital on 9/2 w/ reported AMS. Pt has significant medical hx. Family reported to have made decision to withdrawal of respiratory support earlier today.  paged to be present for support immediately prior to extubation. SPIRITUAL ASSESSMENT/INTERVENTION:   had earlier introduced himself to family members after other  made known to him family's decision re: respiratory support.  returned to room & prayed, read a portion of Scripture, lead family in praying the [address] [facility] Father, further prayed prayers of Commendation. Family accepting of 's presence, prayers, & support. Pt's daughter Rogelio Smith tearful as breathing tube was withdrawn. Among family members is pham Felix RN @ [facility].    SPIRITUAL CARE FOLLOW-UP PLAN:  Chaplains will remain available to offer spiritual and emotional support as needed. Electronically signed by Rev. Bradford Howard, [address] [facility], , on 9/7/2018 at 6:27 PM.  [facility]  [phone]     09/07/18 1817   Encounter Summary   Services provided to: Patient and family together  (Sanjay Oropeza, pt's dad, sister, brother, 2 others in )   Referral/Consult From: Nurse;Family   Support System Children;Parent; Family members   Continue Visiting (9/7)   Complexity of Encounter High   Length of Encounter 15 minutes   Grief and Life Adjustment   Type Palliative care; End of life  (Extubation Response)   Assessment Approachable;Grieving;Tearful;Coping; Anticipatory grief   Intervention Sustaining presence/ Ministry of presence; Active listening;Ritual;Prayer;Scripture; End of life care   Outcome Receptive;Coping;Tearful;Grieving;Encouraged;Comfort;Expressed gratitude

## 2018-09-07 NOTE — PLAN OF CARE
Problem: OXYGENATION/RESPIRATORY FUNCTION  Goal: Patient will maintain patent airway  Outcome: Ongoing  Maintained on the vent    Problem: SKIN INTEGRITY  Goal: Skin integrity is maintained or improved  Outcome: Ongoing  No skin breakdown

## 2018-09-07 NOTE — PROGRESS NOTES
Pt. Extubated at 1815 with family writer and respiratory present.  4mg morphine and 1mg iv ativan given

## 2018-09-07 NOTE — FLOWSHEET NOTE
· Facts: Daughter has decided on extubation and wants to make sure that the patient has been given a ritual to send her off. Extubation may be happening tonight. · Family requested a [employer] for an anointing. · Records show that anointing has already been given to the patient. · Chaplains blessed the patient. · Feelings: Family is grieving, anxious, and depressed    · Family: Daughter is with the patient along with the daughter's significant other     · Maria Fernanda: Patient is [employer], the daughter left the Jew at the age of 27     · Follow-up: Chaplains will be on standby and follow-up for extubation    Electronically signed by Lora Mullen on 9/7/2018 at 3:50 PM        09/07/18 1520   Encounter Summary   Services provided to: Patient and family together   Referral/Consult From: Nurse   Support System Family members   Place of [address] [facility] Visiting (9.7.2018)   Complexity of Encounter High   Length of Encounter 30 minutes   Spiritual Assessment Completed Yes   Spiritual/Oriental orthodox   Type Spiritual struggle   Assessment Grieving;Tearful;Despair;Fearful; Anxious   Intervention Active listening;Explored feelings, thoughts, concerns;Explored coping resources;Nurtured hope;Prayer;Ritual;Discussed death;Discussed afterlife; Discussed belief system/Jehovah's witness practices/maria fernanda   Outcome Expressed gratitude

## 2018-09-07 NOTE — PLAN OF CARE
Problem: MECHANICAL VENTILATION  Goal: Patient will maintain patent airway  Outcome: Completed Date Met: 09/07/18    Goal: Oral health is maintained or improved  Outcome: Completed Date Met: 09/07/18    Goal: ET tube will be managed safely  Outcome: Completed Date Met: 09/07/18    Goal: Ability to express needs and understand communication  Outcome: Completed Date Met: 09/07/18    Goal: Mobility/activity is maintained at optimum level for patient  Outcome: Completed Date Met: 09/07/18      Problem: ACTIVITY INTOLERANCE/IMPAIRED MOBILITY  Goal: Mobility/activity is maintained at optimum level for patient  Outcome: Completed Date Met: 09/07/18      Problem: COMMUNICATION IMPAIRMENT  Goal: Ability to express needs and understand communication  Outcome: Completed Date Met: 09/07/18

## 2018-09-07 NOTE — PROGRESS NOTES
Resident Progress Note  Date: 2018  Time: 12:56 PM  Patient Name: Delio Lopez  Patient : 1960  Room/Bed: [address] [place] [address]  Allergies: No Known Allergies    Interval History:    No acute events overnight. No noted episodes of seizure. Daughter and sister present today Family meeting done. Current Medications:  Scheduled Meds:   potassium phosphate IVPB  10 mmol Intravenous Once    insulin lispro  0-6 Units Subcutaneous 4x Daily AC & HS    famotidine  20 mg Oral BID    levetiracetam  1,000 mg Intravenous Q12H    propranolol  40 mg Oral BID    sodium chloride flush  10 mL Intravenous 2 times per day    enoxaparin  40 mg Subcutaneous Daily    atorvastatin  40 mg Per NG tube Nightly    aspirin  81 mg Per NG tube Daily       Continuous Infusions:   sodium chloride 50 mL/hr at 18 1754       Vitals:  Patient Vitals for the past 8 hrs:   BP Temp Temp src Pulse Resp SpO2   18 1205 128/69 99.7 °F (37.6 °C) Oral 53 15 100 %   18 1105 (!) 158/75 - - 52 20 100 %   18 1100 - - - 52 16 100 %   18 1005 (!) 152/78 - - 56 18 100 %   18 0905 (!) 160/86 - - 60 21 100 %   18 0800 (!) 165/102 100.9 °F (38.3 °C) Oral 72 22 100 %   18 0748 - - - 79 20 100 %   18 0705 (!) 172/93 - - 75 18 100 %   18 0600 (!) 175/95 - - 81 21 100 %   18 0500 (!) 179/91 - - 76 24 99 %     Height and Weight  Height: 5' (152.4 cm)  Weight: 96 lb 9 oz (43.8 kg)  Weight Method: Estimated  BSA (Calculated - sq m): 0.92 sq meters  BMI (Calculated): 18.9  Body mass index is 18.86 kg/m². <16 Severe malnutrition  16-16.99 Moderate malnutrition  17-18.49 Mild malnutrition  18.5-24.9 Normal  25-29.9 Overweight (not obese)  30-34.9 Obese class 1 (Low Risk)  35-39.9 Obese class 2 (Moderate Risk)  ? 40 Obese class 3 (High Risk)    Labs (last 48 hours):  Recent Results (from the past 48 hour(s))   POC Glucose Fingerstick    Collection Time: 18  3:24 PM   Result Value Ref Range    POC Glucose 122 (H) 65 - 105 mg/dL   POTASSIUM    Collection Time: 09/05/18  5:33 PM   Result Value Ref Range    Potassium 3.3 (L) 3.7 - 5.3 mmol/L   Sodium    Collection Time: 09/05/18  5:33 PM   Result Value Ref Range    Sodium 150 (H) 135 - 144 mmol/L   POC Glucose Fingerstick    Collection Time: 09/05/18  9:12 PM   Result Value Ref Range    POC Glucose 143 (H) 65 - 105 mg/dL   SODIUM    Collection Time: 09/06/18 12:01 AM   Result Value Ref Range    Sodium 148 (H) 135 - 144 mmol/L   POTASSIUM    Collection Time: 09/06/18 12:01 AM   Result Value Ref Range    Potassium 3.8 3.7 - 5.3 mmol/L   Arterial Blood Gas, POC    Collection Time: 09/06/18  3:37 AM   Result Value Ref Range    POC pH 7.461 (H) 7.350 - 7.450    POC pCO2 29.5 (L) 35.0 - 48.0 mm Hg    POC PO2 96.3 83.0 - 108.0 mm Hg    POC HCO3 21.0 21.0 - 28.0 mmol/L    TCO2 (calc), Art 22 22.0 - 29.0 mmol/L    Negative Base Excess, Art 2 0.0 - 2.0    Positive Base Excess, Art NOT REPORTED 0.0 - 3.0    POC O2 SAT 98 94.0 - 98.0 %    O2 Device/Flow/% Adult Ventilator     Edilberto Test POSITIVE     Sample Site Left Radial Artery     Mode PRVC     FIO2 30.0     Pt Temp NOT REPORTED     POC pH Temp NOT REPORTED     POC pCO2 Temp NOT REPORTED mm Hg    POC pO2 Temp NOT REPORTED mm Hg   Basic Metabolic Panel    Collection Time: 09/06/18  5:46 AM   Result Value Ref Range    Glucose 175 (H) 70 - 99 mg/dL    BUN 4 (L) 6 - 20 mg/dL    CREATININE 0.40 (L) 0.50 - 0.90 mg/dL    Bun/Cre Ratio NOT REPORTED 9 - 20    Calcium 7.9 (L) 8.6 - 10.4 mg/dL    Sodium 151 (H) 135 - 144 mmol/L    Potassium 3.4 (L) 3.7 - 5.3 mmol/L    Chloride 121 (H) 98 - 107 mmol/L    CO2 21 20 - 31 mmol/L    Anion Gap 9 9 - 17 mmol/L    GFR Non-African American >60 >60 mL/min    GFR African American >60 >60 mL/min    GFR Comment          GFR Staging NOT REPORTED    CALCIUM, IONIZED    Collection Time: 09/06/18  5:46 AM   Result Value Ref Range    Calcium, Ion 1.13 1.13 - 1.33 mmol/L   CBC Auto Differential    Collection Time: 09/06/18  5:46 AM   Result Value Ref Range    WBC 8.2 3.5 - 11.3 k/uL    RBC 2.92 (L) 3.95 - 5.11 m/uL    Hemoglobin 9.2 (L) 11.9 - 15.1 g/dL    Hematocrit 28.3 (L) 36.3 - 47.1 %    MCV 96.9 82.6 - 102.9 fL    MCH 31.5 25.2 - 33.5 pg    MCHC 32.5 28.4 - 34.8 g/dL    RDW 14.5 (H) 11.8 - 14.4 %    Platelets 566 182 - 643 k/uL    MPV 10.3 8.1 - 13.5 fL    NRBC Automated 0.0 0.0 per 100 WBC    Differential Type NOT REPORTED     Seg Neutrophils 73 (H) 36 - 65 %    Lymphocytes 19 (L) 24 - 43 %    Monocytes 6 3 - 12 %    Eosinophils % 1 1 - 4 %    Basophils 0 0 - 2 %    Immature Granulocytes 1 (H) 0 %    Segs Absolute 6.02 1.50 - 8.10 k/uL    Absolute Lymph # 1.52 1.10 - 3.70 k/uL    Absolute Mono # 0.51 0.10 - 1.20 k/uL    Absolute Eos # 0.11 0.00 - 0.44 k/uL    Basophils # <0.03 0.00 - 0.20 k/uL    Absolute Immature Granulocyte 0.05 0.00 - 0.30 k/uL    WBC Morphology NOT REPORTED     RBC Morphology ANISOCYTOSIS PRESENT     Platelet Estimate NOT REPORTED    Magnesium    Collection Time: 09/06/18  5:46 AM   Result Value Ref Range    Magnesium 1.6 1.6 - 2.6 mg/dL   PHOSPHORUS    Collection Time: 09/06/18  5:46 AM   Result Value Ref Range    Phosphorus 1.5 (L) 2.6 - 4.5 mg/dL   OSMOLALITY    Collection Time: 09/06/18  5:46 AM   Result Value Ref Range    Serum Osmolality 310 (H) 275 - 295 mOsm/kg   POC Glucose Fingerstick    Collection Time: 09/06/18  7:31 AM   Result Value Ref Range    POC Glucose 167 (H) 65 - 105 mg/dL   POC Glucose Fingerstick    Collection Time: 09/06/18 12:43 PM   Result Value Ref Range    POC Glucose 256 (H) 65 - 105 mg/dL   SODIUM    Collection Time: 09/06/18 12:58 PM   Result Value Ref Range    Sodium 148 (H) 135 - 144 mmol/L   POC Glucose Fingerstick    Collection Time: 09/06/18  5:53 PM   Result Value Ref Range    POC Glucose 119 (H) 65 - 105 mg/dL   POTASSIUM    Collection Time: 09/06/18  6:18 PM   Result Value Ref Range    Potassium 3.6 (L) 3.7 - 5.3 mmol/L Sodium    Collection Time: 09/06/18  6:18 PM   Result Value Ref Range    Sodium 149 (H) 135 - 144 mmol/L   POC Glucose Fingerstick    Collection Time: 09/06/18  8:45 PM   Result Value Ref Range    POC Glucose 149 (H) 65 - 105 mg/dL   Arterial Blood Gas, POC    Collection Time: 09/07/18  3:54 AM   Result Value Ref Range    POC pH 7.497 (H) 7.350 - 7.450    POC pCO2 29.2 (L) 35.0 - 48.0 mm Hg    POC PO2 74.5 (L) 83.0 - 108.0 mm Hg    POC HCO3 22.6 21.0 - 28.0 mmol/L    TCO2 (calc), Art 24 22.0 - 29.0 mmol/L    Negative Base Excess, Art NOT REPORTED 0.0 - 2.0    Positive Base Excess, Art 0 0.0 - 3.0    POC O2 SAT 96 94.0 - 98.0 %    O2 Device/Flow/% Adult Ventilator     Edilberto Test NOT REPORTED     Sample Site Left Radial Artery     Mode NOT REPORTED     FIO2 30.0     Pt Temp NOT REPORTED     POC pH Temp NOT REPORTED     POC pCO2 Temp NOT REPORTED mm Hg    POC pO2 Temp NOT REPORTED mm Hg   Basic Metabolic Panel    Collection Time: 09/07/18  6:34 AM   Result Value Ref Range    Glucose 158 (H) 70 - 99 mg/dL    BUN 8 6 - 20 mg/dL    CREATININE 0.38 (L) 0.50 - 0.90 mg/dL    Bun/Cre Ratio NOT REPORTED 9 - 20    Calcium 8.3 (L) 8.6 - 10.4 mg/dL    Sodium 148 (H) 135 - 144 mmol/L    Potassium 3.4 (L) 3.7 - 5.3 mmol/L    Chloride 112 (H) 98 - 107 mmol/L    CO2 22 20 - 31 mmol/L    Anion Gap 14 9 - 17 mmol/L    GFR Non-African American >60 >60 mL/min    GFR African American >60 >60 mL/min    GFR Comment          GFR Staging NOT REPORTED    CALCIUM, IONIZED    Collection Time: 09/07/18  6:34 AM   Result Value Ref Range    Calcium, Ion 1.18 1.13 - 1.33 mmol/L   CBC Auto Differential    Collection Time: 09/07/18  6:34 AM   Result Value Ref Range    WBC 13.0 (H) 3.5 - 11.3 k/uL    RBC 3.12 (L) 3.95 - 5.11 m/uL    Hemoglobin 9.8 (L) 11.9 - 15.1 g/dL    Hematocrit 30.3 (L) 36.3 - 47.1 %    MCV 97.1 82.6 - 102.9 fL    MCH 31.4 25.2 - 33.5 pg    MCHC 32.3 28.4 - 34.8 g/dL    RDW 14.6 (H) 11.8 - 14.4 %    Platelets 812 142 - 201 k/uL MPV 10.4 8.1 - 13.5 fL    NRBC Automated 0.0 0.0 per 100 WBC    Differential Type NOT REPORTED     WBC Morphology NOT REPORTED     RBC Morphology ANISOCYTOSIS PRESENT     Platelet Estimate NOT REPORTED     Seg Neutrophils 74 (H) 36 - 65 %    Lymphocytes 18 (L) 24 - 43 %    Monocytes 6 3 - 12 %    Eosinophils % 1 1 - 4 %    Basophils 0 0 - 2 %    Immature Granulocytes 1 (H) 0 %    Segs Absolute 9.59 (H) 1.50 - 8.10 k/uL    Absolute Lymph # 2.30 1.10 - 3.70 k/uL    Absolute Mono # 0.72 0.10 - 1.20 k/uL    Absolute Eos # 0.16 0.00 - 0.44 k/uL    Basophils # 0.03 0.00 - 0.20 k/uL    Absolute Immature Granulocyte 0.17 0.00 - 0.30 k/uL   Magnesium    Collection Time: 09/07/18  6:34 AM   Result Value Ref Range    Magnesium 1.9 1.6 - 2.6 mg/dL   PHOSPHORUS    Collection Time: 09/07/18  6:34 AM   Result Value Ref Range    Phosphorus 2.1 (L) 2.6 - 4.5 mg/dL   POC Glucose Fingerstick    Collection Time: 09/07/18 11:47 AM   Result Value Ref Range    POC Glucose 157 (H) 65 - 105 mg/dL       RADIOLOGY:  XR CHEST PORTABLE   Final Result   Satisfactory endotracheal and enteric tube positions. No focal airspace   disease. CT HEAD WO CONTRAST   Final Result   Evolution of subacute large territory right MCA infarct without acute blood   products identified. Cerebral edema appears more prominent in the interval   with little interval change in midline shift. Chronic findings as above. XR CHEST PORTABLE   Final Result   1. Endotracheal tube terminates 4.5 cm from the lucio. 2. Enteric tube with proximal side-port in the distal esophagus. Advancement   by 5 cm should be considered. MRI brain without contrast   Final Result   Large acute infarct in the right MCA distribution with midline shift and mass   effect as described above.   Midline shift is not changed compared to   prior/recent CT head      Old large MCA infarct on the left      Multiple smaller additional old infarcts are noted as well XR ABDOMEN (KUB) (SINGLE AP VIEW)   Final Result   Support lines and tubes as above. No evidence of bowel obstruction. XR CHEST PORTABLE   Final Result   1. Endotracheal tube with distal tip approximately 7.5 cm above the lucio. Enteric tube in appropriate position. 2. No evidence of acute intrathoracic process. 3. COPD. CTA NECK W CONTRAST   Final Result   Occlusion at the proximal aspect of the M1 segment of the right MCA, likely   contributing to known acute large right MCA territory infarction. Thrombus at the proximal aspect of the right subclavian artery, contributing   to near occlusion. Hypoplastic right vertebral artery, with minimal contrast opacification in   the V2 segment and nonvisualized V1 segment, stable since July 16, 2015.      50% focal stenosis in the left cavernous internal carotid artery secondary to   atherosclerotic calcification. The bilateral common and cervical internal carotid arteries are patent   without focal stenosis. The results were reported to Dr. Kay Wayne at 9:54 p.m. on September 2, 2018. CTA HEAD W CONTRAST   Final Result   Occlusion at the proximal aspect of the M1 segment of the right MCA, likely   contributing to known acute large right MCA territory infarction. Thrombus at the proximal aspect of the right subclavian artery, contributing   to near occlusion. Hypoplastic right vertebral artery, with minimal contrast opacification in   the V2 segment and nonvisualized V1 segment, stable since July 16, 2015.      50% focal stenosis in the left cavernous internal carotid artery secondary to   atherosclerotic calcification. The bilateral common and cervical internal carotid arteries are patent   without focal stenosis. The results were reported to Dr. Kay Wayne at 9:54 p.m. on September 2, 2018.          CT Head WO Contrast   Final Result   Acute large right MCA territory infarction, involving the right frontal, temporal, parietal lobes and the right basal ganglia/insular cortex. Associated mass effect and 4 mm right to left midline shift. No acute intracranial hemorrhage. 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 The results were reported to Dr. Loretta Manning at 9:09 p.m. on September 2, 2018. Labs and imaging reviewed with Dr. Elva Orellanale:  Gen: Intubated  CV: RRR  Resp: CTAB  Abd: soft, non-distended  Skin: Cap refill <2 seconds    NEURO EXAM:  Intubated. localizing to sternal rub with right hand. Patient is responsive to pain in all 4 extremities, more briskly on the right. Mental status   Intubated,moving all 4 extremities to painful stimuli. Cranial nerves   Pupils Round,equal reactive   Motor function  LUE moving to sternal rub   Sensory function Intact to pain     Cerebellar  No involuntary movements or tremors         Gait                  Not tested             NUTRITION:   Continuous tube feeds    DRAINS:   There are no drains to monitor at this time. DVT PROPHYLAXIS:  SCD sleeves  Thigh High ROBBIE Stockings  No anticoagulation at this time. ASSESSMENT/PLAN:  R MCA stroke,previous L MCA stroke, PMHx of IV Drug abuse. NEUROLOGIC:  -Right-sided MCA infarct  -failed weaning  -Target sodium >145  -LTME was discontinued yesterday  -Aspirin 81 mg and lipitor 40 mg  -Neuro checks  -on  keppra 1 g BID.  -discontinue CIWA  -Palliative care on board-appreciate their input    CARDIOVASCULAR:  -Propanolol for blood pressure and withdrawal symptoms  -closer to discharge will switch back to toprol.  -Target SBP<180, stable  -Cardiology consulted  -Echo showing 30% left ventricular ejection fraction with diastolic dysfunction  -Cardiology signed off due to neurological status.     PULMONARY:  -intubated  -failed weaning  -daily blood gases    RENAL/FLUID/ELECTROLYTE:  -target sodium>145  -daily bmps  -3% was

## 2018-09-08 VITALS
WEIGHT: 96.56 LBS | RESPIRATION RATE: 20 BRPM | BODY MASS INDEX: 18.96 KG/M2 | TEMPERATURE: 98.3 F | OXYGEN SATURATION: 94 % | DIASTOLIC BLOOD PRESSURE: 78 MMHG | HEIGHT: 60 IN | SYSTOLIC BLOOD PRESSURE: 145 MMHG | HEART RATE: 76 BPM

## 2018-09-08 PROBLEM — I63.9 ACUTE ISCHEMIC STROKE (HCC): Status: ACTIVE | Noted: 2018-09-08

## 2018-09-08 LAB
CULTURE: NORMAL
Lab: NORMAL
SPECIMEN DESCRIPTION: NORMAL
STATUS: NORMAL

## 2018-09-08 PROCEDURE — 99231 SBSQ HOSP IP/OBS SF/LOW 25: CPT | Performed by: PSYCHIATRY & NEUROLOGY

## 2018-09-08 PROCEDURE — 2500000003 HC RX 250 WO HCPCS: Performed by: FAMILY MEDICINE

## 2018-09-08 PROCEDURE — 99231 SBSQ HOSP IP/OBS SF/LOW 25: CPT | Performed by: INTERNAL MEDICINE

## 2018-09-08 PROCEDURE — 6360000002 HC RX W HCPCS: Performed by: FAMILY MEDICINE

## 2018-09-08 PROCEDURE — 1200000000 HC SEMI PRIVATE

## 2018-09-08 PROCEDURE — 2580000003 HC RX 258: Performed by: NURSE PRACTITIONER

## 2018-09-08 RX ORDER — LORAZEPAM 2 MG/ML
1 INJECTION INTRAMUSCULAR
Status: CANCELLED | OUTPATIENT
Start: 2018-09-08

## 2018-09-08 RX ORDER — GLYCOPYRROLATE 0.2 MG/ML
0.1 INJECTION INTRAMUSCULAR; INTRAVENOUS EVERY 4 HOURS PRN
Status: CANCELLED | OUTPATIENT
Start: 2018-09-08

## 2018-09-08 RX ORDER — BISACODYL 10 MG
10 SUPPOSITORY, RECTAL RECTAL DAILY PRN
Status: CANCELLED | OUTPATIENT
Start: 2018-09-08

## 2018-09-08 RX ORDER — MORPHINE SULFATE 2 MG/ML
2 INJECTION, SOLUTION INTRAMUSCULAR; INTRAVENOUS
Status: CANCELLED | OUTPATIENT
Start: 2018-09-08

## 2018-09-08 RX ORDER — BISACODYL 10 MG
10 SUPPOSITORY, RECTAL RECTAL DAILY PRN
Status: DISCONTINUED | OUTPATIENT
Start: 2018-09-08 | End: 2018-09-09 | Stop reason: HOSPADM

## 2018-09-08 RX ORDER — MORPHINE SULFATE 4 MG/ML
4 INJECTION, SOLUTION INTRAMUSCULAR; INTRAVENOUS
Status: CANCELLED | OUTPATIENT
Start: 2018-09-08

## 2018-09-08 RX ORDER — SODIUM CHLORIDE 0.9 % (FLUSH) 0.9 %
10 SYRINGE (ML) INJECTION 2 TIMES DAILY
Status: DISCONTINUED | OUTPATIENT
Start: 2018-09-08 | End: 2018-09-09 | Stop reason: HOSPADM

## 2018-09-08 RX ADMIN — MORPHINE SULFATE 2 MG: 2 INJECTION, SOLUTION INTRAMUSCULAR; INTRAVENOUS at 23:02

## 2018-09-08 RX ADMIN — LORAZEPAM 1 MG: 2 INJECTION INTRAMUSCULAR; INTRAVENOUS at 13:39

## 2018-09-08 RX ADMIN — MORPHINE SULFATE 2 MG: 2 INJECTION, SOLUTION INTRAMUSCULAR; INTRAVENOUS at 19:21

## 2018-09-08 RX ADMIN — Medication 10 ML: at 20:49

## 2018-09-08 RX ADMIN — MORPHINE SULFATE 2 MG: 2 INJECTION, SOLUTION INTRAMUSCULAR; INTRAVENOUS at 15:22

## 2018-09-08 RX ADMIN — MORPHINE SULFATE 4 MG: 4 INJECTION INTRAVENOUS at 13:40

## 2018-09-08 RX ADMIN — GLYCOPYRROLATE 0.1 MG: 0.2 INJECTION INTRAMUSCULAR; INTRAVENOUS at 20:48

## 2018-09-08 RX ADMIN — LORAZEPAM 1 MG: 2 INJECTION INTRAMUSCULAR; INTRAVENOUS at 19:23

## 2018-09-08 ASSESSMENT — PAIN SCALES - WONG BAKER

## 2018-09-08 ASSESSMENT — PAIN SCALES - GENERAL
PAINLEVEL_OUTOF10: 3
PAINLEVEL_OUTOF10: 3
PAINLEVEL_OUTOF10: 7

## 2018-09-08 NOTE — H&P
mouth every 6 hours as needed for Pain 10/25/17   Judy Estrada MD   gabapentin (NEURONTIN) 300 MG capsule Take 1 capsule by mouth 3 times daily 10/16/17   Judy Estrada MD   Incontinence Supply Disposable (INCONTINENCE BRIEF MEDIUM) MISC Use daily 10/16/17   Judy Estarda MD   metoprolol succinate (TOPROL XL) 25 MG extended release tablet Take 0.5 tablets by mouth daily 9/15/16   Judy Estrada MD   docusate (COLACE, DULCOLAX) 100 MG CAPS Take 100 mg by mouth 2 times daily as needed (constipation) 9/15/16   Judy Estrada MD   levETIRAcetam (KEPPRA) 500 MG tablet Take 1 tablet by mouth 2 times daily 9/15/16   Judy Estrada MD   famotidine (PEPCID) 20 MG tablet Take 1 tablet by mouth daily 9/15/16   Judy Estrada MD   atorvastatin (LIPITOR) 80 MG tablet Take 0.5 tablets by mouth nightly 9/15/16   Judy Estrada MD   metFORMIN (GLUCOPHAGE) 500 MG tablet Take 1 tablet by mouth daily (with breakfast) 9/15/16   Judy Estrada MD   oxybutynin (DITROPAN-XL) 5 MG CR tablet Take 1 tablet by mouth nightly 7/31/15   Karen Espinosa MD        Allergies:     Patient has no known allergies. Social History:     Tobacco:    reports that she has been smoking Cigarettes. She has been smoking about 2.00 packs per day. She has never used smokeless tobacco.  Alcohol:      reports that she does not drink alcohol. Drug Use:  reports that she uses drugs, including Marijuana and IV. Family History:     No family history on file. Review of Systems:   Un able to obtain    Physical Exam:   BP (!) 169/103   Pulse 56   Temp 99.7 °F (37.6 °C) (Oral)   Resp 30   Ht 5' (1.524 m)   Wt 96 lb 9 oz (43.8 kg)   LMP  (LMP Unknown)   SpO2 100%   BMI 18.86 kg/m²   No data recorded.     Recent Labs      09/06/18   1243  09/06/18   1753  09/06/18   2045  09/07/18   1147   POCGLU  256*  119*  149*  157*       Intake/Output Summary (Last 24 hours) at 09/08/18 1716  Last data filed at 09/07/18 1718   Gross per 24 hour Intake              489 ml   Output                0 ml   Net              489 ml     General Appearance: comatose, un able to follow commands  Mental status: comatose  Head:  normocephalic, atraumatic. Eye: no icterus, redness, pupils equal and reactive, extraocular eye movements intact, conjunctiva clear  Ear: normal external ear, no discharge, hearing intact  Nose:  no drainage noted  Mouth: mucous membranes moist  Neck: supple, no carotid bruits, thyroid not palpable  Lungs: Bilateral equal air entry, clear to ausculation, no wheezing, rales or rhonchi  Cardiovascular: normal rate, regular rhythm, no murmur, gallop, rub. Abdomen: Soft, nontender, nondistended, normal bowel sounds, no hepatomegaly  Neurologic: There are no new focal motor or sensory deficits, normal muscle tone and bulk, no abnormal sensation, normal speech, cranial nerves II through XII grossly intact  Skin: No gross lesions, rashes, bruising or bleeding on exposed skin area  Extremities:  peripheral pulses palpable, no pedal edema or calf pain with palpation    Assessment :      Primary Problem  Large MCA stroke, right sided    Active Hospital Problems    Diagnosis Date Noted    Encounter for palliative care [Z51.5]     Drug overdose [T50.901A]     Cerebrovascular accident (CVA) ([address] [I63.9]     Seizure ([address] [R56.9]     Midline shift of brain due to stroke [G93.9]     Hx of ischemic left MCA stroke [Z86.73]     Acute cerebrovascular accident (CVA) ([address] [I63.9] 09/02/2018     Plan:     Large right MCA stroke  Previous left MCA stroke  Hypertension  Heroin abuse    In palliative care now.  [facility] and hospice admission  Discussed with family at bedside  Time spent is about 35 minutes      Consultations:   IP CONSULT TO STROKE TEAM  IP CONSULT TO NEUROSURGERY  IP CONSULT TO NEUROSURGERY  IP CONSULT TO CARDIOLOGY  PHARMACY TO DOSE VANCOMYCIN  IP CONSULT TO DIETITIAN  IP CONSULT TO PALLIATIVE CARE  IP CONSULT TO IV TEAM  IP CONSULT TO HOSPICE    Patient is admitted as inpatient status because of co-morbidities listed above, severity of signs and symptoms as outlined, requirement for current medical therapies and most importantly because of direct risk to patient if care not provided in a hospital setting.     Kaveh Hall MD  9/8/2018  5:16 PM

## 2018-09-08 NOTE — CARE COORDINATION
Call to [facility] [address] [place] [address] and spoke w/Anita in Admissions  They will send a nurse out to meet with pt's family at 4:30-5pm    August Caty from [facility] [address] [place] [address] visited pt's family. Hospice bed conversion in process w/[facility] accepting  At this time, pt's daughter is adamant about pt remaining in the hospital and refusing to consider transfer to hospice facility.

## 2018-09-08 NOTE — PROGRESS NOTES
Daily Progress Note  Neuro Critical Care    Patient Name: Taylor Dodd  Patient : 1960  Room/Bed: [facility]/3115-96  Allergies: No Known Allergies  Problem List:   Patient Active Problem List   Diagnosis    Right hemisphere, cerebral infarction ([facility] [address]    Somnolence    CHF (congestive heart failure) ([facility] [address]    Heroin overdose    Lactic acidosis    Hyperglycemia    Acute respiratory failure ([address]    H/O essential hypertension    Smoker    Seizure-like activity (HCC)    Bilious vomiting with nausea    Urinary incontinence, urge    Acute cerebrovascular accident (CVA) ([address]    Midline shift of brain due to stroke    Hx of ischemic left MCA stroke    Cerebrovascular accident (CVA) ([facility] [address]    Seizure ([facility] [address]    Encounter for palliative care    Drug overdose       CHIEF COMPLAINT:     Large acute right MCA distribution infarct     INTERVAL HISTORY    Initial Presentation (Admitted 18): The patient is a 57 yo female who presented to [facility] on 19 with AMS. Per significant other, patient has a history of IV drug abuse and she was using heroin that night. He found her unresponsive and brought her to the ED for evaluation. Patient was intubated emergently in the ED due to concerns for acute respiratory failure. Narcan administered without much improvement. In the ED, patient was only moving her left upper and lower extremity and had a right gaze deviation. CT Head showed large right MCA infarct with 4mm shift. CTA Head/Neck showed occlusion of M1 MCA. She has a history of a large L MCA stroke in 2015 and was reportedly on anti-seizure medication for a seizure disorder. Hospital Course:   9/3:  MRI Brain showed large R MCA territory infarct with stable midline shift. Treated with hyperosmolar therapy. On Vasopressor briefly overnight for hypotension. Loaded with Keppra and maintained on 500mg BID. Broad spectrum antibiotics.   : Seizure overnight; Keppra increased to 1g Results   Component Value Date    WBC 13.0 (H) 09/07/2018    HGB 9.8 (L) 09/07/2018    HCT 30.3 (L) 09/07/2018     09/07/2018    CHOL 135 09/03/2018    TRIG 65 09/03/2018    HDL 50 09/03/2018    ALT 6 09/02/2018    AST 14 09/02/2018     (H) 09/07/2018    K 3.4 (L) 09/07/2018     (H) 09/07/2018    CREATININE 0.38 (L) 09/07/2018    BUN 8 09/07/2018    CO2 22 09/07/2018    INR 1.1 09/02/2018    INR 1.1 09/02/2018    LABA1C 6.0 02/07/2016     24 HOUR INTAKE/OUTPUT:  Intake/Output Summary (Last 24 hours) at 09/08/18 0808  Last data filed at 09/07/18 1718   Gross per 24 hour   Intake              489 ml   Output                0 ml   Net              489 ml       RADIOLOGY:   Xr Abdomen (kub) (single Ap View)    Result Date: 9/3/2018  EXAMINATION: SINGLE SUPINE XRAY VIEW(S) OF THE ABDOMEN 9/3/2018 3:53 am COMPARISON: None. HISTORY: ORDERING SYSTEM PROVIDED HISTORY: eval for foreign bodies, pre-MRI TECHNOLOGIST PROVIDED HISTORY: eval for foreign bodies, pre-MRI FINDINGS: Hyperdensities projecting over the midpelvis likely represent fibroid calcifications or phleboliths. Right femoral catheter and enteric tube projecting over left upper abdominal quadrant noted. Campos catheter tubing noted. Bowel gas pattern is nonobstructive/nonspecific. Osseous structures are without acute abnormality. Support lines and tubes as above. No evidence of bowel obstruction. Ct Head Wo Contrast    Result Date: 9/5/2018  EXAMINATION: CT OF THE HEAD WITHOUT CONTRAST  9/5/2018 2:06 pm TECHNIQUE: CT of the head was performed without the administration of intravenous contrast. Dose modulation, iterative reconstruction, and/or weight based adjustment of the mA/kV was utilized to reduce the radiation dose to as low as reasonably achievable. COMPARISON: MRI September 3, 2018, CT September 2, 2018.  HISTORY: ORDERING SYSTEM PROVIDED HISTORY: re-evaluate CVA TECHNOLOGIST PROVIDED HISTORY: FINDINGS: BRAIN/VENTRICLES: Large territory right MCA ischemia is again demonstrated. Cerebral edema with midline shift measuring 6 mm is noted (previously 4 mm), however mass-effect on the right lateral ventricle is more prominent in the interval.  No acute blood products identified. Old left MCA infarct is again demonstrated. Old cerebellar ischemic changes again noted. There is no extra-axial fluid. ORBITS: The visualized portion of the orbits demonstrate no acute abnormality. SINUSES: Mucosal thickening in the left maxillary sinus has developed. SOFT TISSUES/SKULL:  No acute abnormality of the visualized skull or soft tissues. Evolution of subacute large territory right MCA infarct without acute blood products identified. Cerebral edema appears more prominent in the interval with little interval change in midline shift. Chronic findings as above. Ct Head Wo Contrast    Result Date: 9/2/2018  EXAMINATION: CT OF THE HEAD WITHOUT CONTRAST  9/2/2018 8:55 pm TECHNIQUE: CT of the head was performed without the administration of intravenous contrast. Dose modulation, iterative reconstruction, and/or weight based adjustment of the mA/kV was utilized to reduce the radiation dose to as low as reasonably achievable. COMPARISON: MRI brain February 7, 2016 HISTORY: ORDERING SYSTEM PROVIDED HISTORY: STROKE TECHNOLOGIST PROVIDED HISTORY: FINDINGS: BRAIN/VENTRICLES: There is large area of decreased gray-white differentiation in the right MCA territory involving the right frontal, temporal and parietal lobes, and the right basilar and insula cortex, consistent with acute large right MCA territory infarction. There is associated mass effect with decreased sulcation and compression of the right lateral ventricle. There is 4 mm right to left midline shift. There is large old left MCA territory infarction, involving the left frontal, parietal and temporal lobes. There are old infarctions in the bilateral cerebellar hemispheres, left more than right. There is parenchymal volume loss. There is periventricular white matter low attenuation, likely related to mild chronic microvascular disease. There is no acute intracranial hemorrhage or abnormal extra-axial collection. There is no evidence of hydrocephalus. ORBITS: There is mild air-fluid level in the left maxillary sinus. The remainder of the visualized portion of the orbits demonstrate no acute abnormality. SINUSES: The visualized paranasal sinuses and mastoid air cells demonstrate no acute abnormality. SOFT TISSUES/SKULL:  No acute abnormality of the visualized skull or soft tissues. Acute large right MCA territory infarction, involving the right frontal, temporal, parietal lobes and the right basal ganglia/insular cortex. Associated mass effect and 4 mm right to left midline shift. No acute intracranial hemorrhage. 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 The results were reported to Dr. Celestina Cho at 9:09 p.m. on September 2, 2018. Xr Chest Portable    Result Date: 9/6/2018  EXAMINATION: SINGLE XRAY VIEW OF THE CHEST 9/6/2018 5:48 am COMPARISON: September 4, 2018 HISTORY: ORDERING SYSTEM PROVIDED HISTORY: evaluate for acute process, intubated FINDINGS: Endotracheal tube is in place with tip approximately 2 cm above the lucio. Enteric tube is in satisfactory position. No consolidation, effusion or pneumothorax. Normal heart size. Calcification of the aortic knob. Osseous structures grossly intact. Satisfactory endotracheal and enteric tube positions. No focal airspace disease. Xr Chest Portable    Result Date: 9/4/2018  EXAMINATION: SINGLE XRAY VIEW OF THE CHEST 9/4/2018 1:49 am COMPARISON: 09/02/2018 HISTORY: ORDERING SYSTEM PROVIDED HISTORY: check ETT placement after advancement TECHNOLOGIST PROVIDED HISTORY: check ETT placement after advancement FINDINGS: Endotracheal tube has been advanced with tip now 4.5 cm from the lucio. Enteric tube is present. The proximal side port is in the distal esophagus. Advancement by 5 cm should be considered. Trachea is essentially midline. No lung infiltrate or consolidation. No definite pneumothorax or pleural effusion. Heart size is normal.     1. Endotracheal tube terminates 4.5 cm from the lucio. 2. Enteric tube with proximal side-port in the distal esophagus. Advancement by 5 cm should be considered. Xr Chest Portable    Result Date: 9/3/2018  EXAMINATION: SINGLE XRAY VIEW OF THE CHEST, 9/2/2018 8:41 pm COMPARISON: Chest x-ray dated 04/16/2016 HISTORY: ORDERING SYSTEM PROVIDED HISTORY: SOB TECHNOLOGIST PROVIDED HISTORY: SOB FINDINGS: Endotracheal tube is in place with distal tip approximately 7.5 cm above the lucio. Enteric tube extends below the left hemidiaphragm with distal tip overlying the proximal stomach. The cardiomediastinal silhouette and pulmonary vasculature are within normal limits. Lungs are hyperinflated. No focal airspace consolidation, pneumothorax, or pleural effusion. Nipple shadow is noted overlying the mid right lung. No evidence of acute osseous abnormality. No free air beneath the diaphragm. 1. Endotracheal tube with distal tip approximately 7.5 cm above the lucio. Enteric tube in appropriate position. 2. No evidence of acute intrathoracic process. 3. COPD. Cta Neck W Contrast    Result Date: 9/2/2018  EXAMINATION: CTA OF THE HEAD WITH CONTRAST; CTA OF THE NECK 9/2/2018 9:08 pm: TECHNIQUE: CTA of the head/brain was performed with the administration of intravenous contrast. Multiplanar reformatted images are provided for review. MIP images are provided for review.  Dose modulation, iterative reconstruction, and/or weight based adjustment of the mA/kV was utilized to reduce the radiation dose to as low as reasonably achievable.; CTA of the neck was performed with the administration of intravenous contrast. Multiplanar reformatted images are provided for review. MIP images are provided for review. Stenosis of the internal carotid arteries measured using NASCET criteria. Dose modulation, iterative reconstruction, and/or weight based adjustment of the mA/kV was utilized to reduce the radiation dose to as low as reasonably achievable. COMPARISON: CT head September 2018, CTA head and neck July 16, 2015 HISTORY: ORDERING SYSTEM PROVIDED HISTORY: STROKE TECHNOLOGIST PROVIDED HISTORY: FINDINGS: CTA NECK: AORTIC ARCH/ARCH VESSELS: There is a normal branch pattern of the aortic arch. There is thrombus at the origin of the right subclavian artery, contributing to near occlusion. The left subclavian artery is patent without flow limiting stenosis. CAROTID ARTERIES: The common carotid arteries are normal in appearance without evidence of a flow limiting stenosis. The internal carotid arteries are normal in appearance without evidence of a flow limiting stenosis by NASCET criteria. No dissection or arterial injury is seen. VERTEBRAL ARTERIES: The vertebral arteries both arise from the subclavian arteries. There is left dominance of the vertebral arteries. The left vertebral artery is within normal limits, without flow limiting stenosis. There is hypoplastic right vertebral artery, with minimal contrast opacification in the V2 segment and nonvisualized V1 segment, stable since July 16, 2015. SOFT TISSUES: The lung apices are clear. No cervical or superior mediastinal lymphadenopathy. The visualized portion of the larynx and pharynx appear unremarkable. The parotid, submandibular and thyroid glands demonstrate no acute abnormality. BONES: The visualized osseous structures appear unremarkable. CTA HEAD: ANTERIOR CIRCULATION: There is up to 50% focal stenosis in the left cavernous internal carotid artery. The right cavernous internal carotid artery and the bilateral supraclinoid internal carotid arteries are within normal limits.  There is occlusion of right MCA at the proximal aspect of the M1 segment. The anterior cerebral and left middle cerebral ar artery demonstrate no focal stenosis. POSTERIOR CIRCULATION: The posterior cerebral arteries demonstrate no focal stenosis. There is left dominance of the vertebral arteries. The vertebral and basilar arteries appear unremarkable. BRAIN: There is known acute large right MCA territory infarction, involving the right frontal, parietal and temporal lobes, the right basal ganglia and insular cortex. There is associated mass effect and 5 mm right left midline shift. There is large old left MCA territory infarction. There are smaller infarctions involving the left bilateral cerebellar hemispheres, left more than right. Occlusion at the proximal aspect of the M1 segment of the right MCA, likely contributing to known acute large right MCA territory infarction. Thrombus at the proximal aspect of the right subclavian artery, contributing to near occlusion. Hypoplastic right vertebral artery, with minimal contrast opacification in the V2 segment and nonvisualized V1 segment, stable since July 16, 2015. 50% focal stenosis in the left cavernous internal carotid artery secondary to atherosclerotic calcification. The bilateral common and cervical internal carotid arteries are patent without focal stenosis. The results were reported to Dr. Tyrell Schrader at 9:54 p.m. on September 2, 2018. Cta Head W Contrast    Result Date: 9/2/2018  EXAMINATION: CTA OF THE HEAD WITH CONTRAST; CTA OF THE NECK 9/2/2018 9:08 pm: TECHNIQUE: CTA of the head/brain was performed with the administration of intravenous contrast. Multiplanar reformatted images are provided for review. MIP images are provided for review.  Dose modulation, iterative reconstruction, and/or weight based adjustment of the mA/kV was utilized to reduce the radiation dose to as low as reasonably achievable.; CTA of the neck was performed with the administration of intravenous contrast. Multiplanar reformatted images are provided for review. MIP images are provided for review. Stenosis of the internal carotid arteries measured using NASCET criteria. Dose modulation, iterative reconstruction, and/or weight based adjustment of the mA/kV was utilized to reduce the radiation dose to as low as reasonably achievable. COMPARISON: CT head September 2018, CTA head and neck July 16, 2015 HISTORY: ORDERING SYSTEM PROVIDED HISTORY: STROKE TECHNOLOGIST PROVIDED HISTORY: FINDINGS: CTA NECK: AORTIC ARCH/ARCH VESSELS: There is a normal branch pattern of the aortic arch. There is thrombus at the origin of the right subclavian artery, contributing to near occlusion. The left subclavian artery is patent without flow limiting stenosis. CAROTID ARTERIES: The common carotid arteries are normal in appearance without evidence of a flow limiting stenosis. The internal carotid arteries are normal in appearance without evidence of a flow limiting stenosis by NASCET criteria. No dissection or arterial injury is seen. VERTEBRAL ARTERIES: The vertebral arteries both arise from the subclavian arteries. There is left dominance of the vertebral arteries. The left vertebral artery is within normal limits, without flow limiting stenosis. There is hypoplastic right vertebral artery, with minimal contrast opacification in the V2 segment and nonvisualized V1 segment, stable since July 16, 2015. SOFT TISSUES: The lung apices are clear. No cervical or superior mediastinal lymphadenopathy. The visualized portion of the larynx and pharynx appear unremarkable. The parotid, submandibular and thyroid glands demonstrate no acute abnormality. BONES: The visualized osseous structures appear unremarkable. CTA HEAD: ANTERIOR CIRCULATION: There is up to 50% focal stenosis in the left cavernous internal carotid artery.   The right cavernous internal carotid artery and the bilateral supraclinoid internal carotid arteries are within normal limits. There is occlusion of right MCA at the proximal aspect of the M1 segment. The anterior cerebral and left middle cerebral ar artery demonstrate no focal stenosis. POSTERIOR CIRCULATION: The posterior cerebral arteries demonstrate no focal stenosis. There is left dominance of the vertebral arteries. The vertebral and basilar arteries appear unremarkable. BRAIN: There is known acute large right MCA territory infarction, involving the right frontal, parietal and temporal lobes, the right basal ganglia and insular cortex. There is associated mass effect and 5 mm right left midline shift. There is large old left MCA territory infarction. There are smaller infarctions involving the left bilateral cerebellar hemispheres, left more than right. Occlusion at the proximal aspect of the M1 segment of the right MCA, likely contributing to known acute large right MCA territory infarction. Thrombus at the proximal aspect of the right subclavian artery, contributing to near occlusion. Hypoplastic right vertebral artery, with minimal contrast opacification in the V2 segment and nonvisualized V1 segment, stable since July 16, 2015. 50% focal stenosis in the left cavernous internal carotid artery secondary to atherosclerotic calcification. The bilateral common and cervical internal carotid arteries are patent without focal stenosis. The results were reported to Dr. Vanessa Banks at 9:54 p.m. on September 2, 2018. Mri Brain Without Contrast    Result Date: 9/3/2018  EXAMINATION: MRI OF THE BRAIN WITHOUT CONTRAST  9/3/2018 5:26 am TECHNIQUE: Multiplanar multisequence MRI of the brain was performed without the administration of intravenous contrast. COMPARISON: None. HISTORY: ORDERING SYSTEM PROVIDED HISTORY: ischemic stroke FINDINGS: INTRACRANIAL STRUCTURES/VENTRICLES: Left lateral ventricle is larger than the right.   Midline shift from right to left is very similar compared to recent CT exam.  There is a large amount of left hemispheric encephalomalacia with surrounding high signal, likely gliosis. Encephalomalacia in the left cerebellar hemisphere is also noted. Small old infarcts are noted in the right cerebellar hemisphere is well. Increased signal in the anterior leftward aspect of the midbrain and sandra is noted associated with volume loss suggesting wallerian degeneration. There is a large area of increased T2, FLAIR, and diffusion signal hyperintensity in the right middle cerebral artery distribution involving cortex and white matter. This extends from the cortex deep to the margin of the right lateral ventricle in the caudate region. There is associated mass effect on the right lateral ventricle and midline shift from right to left. There is no acute hemorrhage. Right MCA flow void is not seen. ORBITS: Radiographic proptosis is noted bilaterally. SINUSES: Left maxillary sinus mucosal thickening and a small amount of left maxillary sinus fluid is noted. BONES/SOFT TISSUES: The bone marrow signal intensity appears normal. The soft tissues demonstrate no acute abnormality. Large acute infarct in the right MCA distribution with midline shift and mass effect as described above. Midline shift is not changed compared to prior/recent CT head Old large MCA infarct on the left Multiple smaller additional old infarcts are noted as well       Labs and Images reviewed with:  [x] Aniya Cook  [] Lupis Carmichael MD      [] Natalie Parker  [] Alondra Villela  [] there are no new interval images to review. PHYSICAL EXAM       CONSTITUTIONAL:  Toxic appearing. Does not open eyes or follow commands.    HEAD:  normocephalic, atraumatic    EYES:  PERRL, Left gaze preference   ENT:  moist mucous membranes, drooling   NECK:  supple, symmetric   LUNGS:  Equal air entry bilaterally, clear   CARDIOVASCULAR:  normal s1 / s2, RRR, distal pulses intact   ABDOMEN:  Soft, no rigidity, normal bowel sounds   NEUROLOGIC:  Mental Status:

## 2018-09-09 ENCOUNTER — HOSPITAL ENCOUNTER (INPATIENT)
Age: 58
LOS: 1 days | Discharge: HOSPICE | DRG: 064 | End: 2018-09-10
Attending: INTERNAL MEDICINE | Admitting: PSYCHIATRY & NEUROLOGY
Payer: COMMERCIAL

## 2018-09-09 PROBLEM — I63.511 ACUTE ISCHEMIC RIGHT MCA STROKE (HCC): Status: ACTIVE | Noted: 2018-09-09

## 2018-09-09 PROCEDURE — 99231 SBSQ HOSP IP/OBS SF/LOW 25: CPT | Performed by: INTERNAL MEDICINE

## 2018-09-09 PROCEDURE — 2500000003 HC RX 250 WO HCPCS: Performed by: NURSE PRACTITIONER

## 2018-09-09 PROCEDURE — 2500000003 HC RX 250 WO HCPCS: Performed by: FAMILY MEDICINE

## 2018-09-09 PROCEDURE — 6360000002 HC RX W HCPCS: Performed by: NURSE PRACTITIONER

## 2018-09-09 PROCEDURE — 1250000000 HC SEMI PRIVATE HOSPICE R&B

## 2018-09-09 PROCEDURE — 6360000002 HC RX W HCPCS: Performed by: INTERNAL MEDICINE

## 2018-09-09 PROCEDURE — 6360000002 HC RX W HCPCS: Performed by: FAMILY MEDICINE

## 2018-09-09 RX ORDER — BISACODYL 10 MG
10 SUPPOSITORY, RECTAL RECTAL DAILY PRN
Status: DISCONTINUED | OUTPATIENT
Start: 2018-09-09 | End: 2018-09-10 | Stop reason: HOSPADM

## 2018-09-09 RX ORDER — GLYCOPYRROLATE 0.2 MG/ML
0.1 INJECTION INTRAMUSCULAR; INTRAVENOUS EVERY 4 HOURS PRN
Status: DISCONTINUED | OUTPATIENT
Start: 2018-09-09 | End: 2018-09-10 | Stop reason: HOSPADM

## 2018-09-09 RX ORDER — LORAZEPAM 2 MG/ML
1 INJECTION INTRAMUSCULAR
Status: DISCONTINUED | OUTPATIENT
Start: 2018-09-09 | End: 2018-09-10 | Stop reason: HOSPADM

## 2018-09-09 RX ORDER — MORPHINE SULFATE 4 MG/ML
4 INJECTION, SOLUTION INTRAMUSCULAR; INTRAVENOUS
Status: DISCONTINUED | OUTPATIENT
Start: 2018-09-09 | End: 2018-09-10 | Stop reason: HOSPADM

## 2018-09-09 RX ORDER — MORPHINE SULFATE 2 MG/ML
2 INJECTION, SOLUTION INTRAMUSCULAR; INTRAVENOUS
Status: DISCONTINUED | OUTPATIENT
Start: 2018-09-09 | End: 2018-09-10 | Stop reason: HOSPADM

## 2018-09-09 RX ORDER — MORPHINE SULFATE 2 MG/ML
2 INJECTION, SOLUTION INTRAMUSCULAR; INTRAVENOUS EVERY 4 HOURS
Status: DISCONTINUED | OUTPATIENT
Start: 2018-09-09 | End: 2018-09-10 | Stop reason: HOSPADM

## 2018-09-09 RX ADMIN — GLYCOPYRROLATE 0.1 MG: 0.2 INJECTION INTRAMUSCULAR; INTRAVENOUS at 04:49

## 2018-09-09 RX ADMIN — MORPHINE SULFATE 2 MG: 2 INJECTION, SOLUTION INTRAMUSCULAR; INTRAVENOUS at 16:07

## 2018-09-09 RX ADMIN — MORPHINE SULFATE 2 MG: 2 INJECTION, SOLUTION INTRAMUSCULAR; INTRAVENOUS at 21:00

## 2018-09-09 RX ADMIN — MORPHINE SULFATE 2 MG: 2 INJECTION, SOLUTION INTRAMUSCULAR; INTRAVENOUS at 12:08

## 2018-09-09 RX ADMIN — LORAZEPAM 1 MG: 2 INJECTION INTRAMUSCULAR; INTRAVENOUS at 18:56

## 2018-09-09 RX ADMIN — MORPHINE SULFATE 2 MG: 2 INJECTION, SOLUTION INTRAMUSCULAR; INTRAVENOUS at 16:47

## 2018-09-09 RX ADMIN — LORAZEPAM 1 MG: 2 INJECTION INTRAMUSCULAR; INTRAVENOUS at 10:33

## 2018-09-09 RX ADMIN — GLYCOPYRROLATE 0.1 MG: 0.2 INJECTION INTRAMUSCULAR; INTRAVENOUS at 10:34

## 2018-09-09 RX ADMIN — MORPHINE SULFATE 2 MG: 2 INJECTION, SOLUTION INTRAMUSCULAR; INTRAVENOUS at 09:39

## 2018-09-09 RX ADMIN — LORAZEPAM 1 MG: 2 INJECTION INTRAMUSCULAR; INTRAVENOUS at 04:52

## 2018-09-09 RX ADMIN — MORPHINE SULFATE 2 MG: 2 INJECTION, SOLUTION INTRAMUSCULAR; INTRAVENOUS at 23:32

## 2018-09-09 RX ADMIN — LORAZEPAM 1 MG: 2 INJECTION INTRAMUSCULAR; INTRAVENOUS at 14:32

## 2018-09-09 RX ADMIN — GLYCOPYRROLATE 0.1 MG: 0.2 INJECTION INTRAMUSCULAR; INTRAVENOUS at 20:44

## 2018-09-09 RX ADMIN — MORPHINE SULFATE 2 MG: 2 INJECTION, SOLUTION INTRAMUSCULAR; INTRAVENOUS at 18:57

## 2018-09-09 ASSESSMENT — PAIN SCALES - GENERAL
PAINLEVEL_OUTOF10: 4
PAINLEVEL_OUTOF10: 3
PAINLEVEL_OUTOF10: 4
PAINLEVEL_OUTOF10: 3
PAINLEVEL_OUTOF10: 2
PAINLEVEL_OUTOF10: 3

## 2018-09-09 ASSESSMENT — PAIN SCALES - PAIN ASSESSMENT IN ADVANCED DEMENTIA (PAINAD)
TOTALSCORE: 3
FACIALEXPRESSION: 2
NEGVOCALIZATION: 0
BODYLANGUAGE: 1
CONSOLABILITY: 0
BREATHING: 0

## 2018-09-09 NOTE — PROGRESS NOTES
[facility]    Progress Note    2018    10:00 AM    Name:   Ramsey Bustamante  MRN:     [id number]     Acct:      [de-identified]   Room:   [address] [place] [address] Day:  0  Admit Date:  2018  8:56 AM    PCP:   No primary care provider on file. Code Status:  Prior    Subjective:     C/C: altered mental status    Interval History Status: not changed. Continued severe mentation changes  In comfort care at this time  Vitals stable    Brief History:     Miss Waldo Bertrand is a nice 56year old lady with history of poly substance abuse with tobacco, alcohol, heroin intermittently. Also has stroke in past. Admitted with altered mentation with finding of large stroke in right MCA area with cerebral edema and herniation. After family discussions patient was made DNR and was admitted to hospice care today    Review of Systems:   Un able to obtain    Medications: Allergies:  No Known Allergies    Current Meds:   Scheduled Meds:   Continuous Infusions:   PRN Meds: bisacodyl, Glycopyrrolate, LORazepam, morphine **OR** morphine    Data:     Past Medical History:   has a past medical history of CAD (coronary artery disease); CHF (congestive heart failure) ([facility] [address] Hypertension; MRSA (methicillin resistant staph aureus) culture positive; Seizure ([facility] [address] Unspecified cerebral artery occlusion with cerebral infarction; and Urinary incontinence, urge. Social History:   reports that she has been smoking Cigarettes. She has been smoking about 2.00 packs per day. She has never used smokeless tobacco. She reports that she uses drugs, including Marijuana and IV. She reports that she does not drink alcohol. Family History: No family history on file.     Vitals:  LMP  (LMP Unknown)   Temp (24hrs), Av.3 °F (36.8 °C), Min:98.3 °F (36.8 °C), Max:98.3 °F (36.8 °C)    Recent Labs      18   1243  18   1753  18   2045  18   1147   POCGLU  256*  119*  149*

## 2018-09-09 NOTE — PLAN OF CARE
Problem: Risk for Impaired Skin Integrity  Goal: Tissue integrity - skin and mucous membranes  Structural intactness and normal physiological function of skin and  mucous membranes. Outcome: Ongoing  Problem: Risk for Impaired Skin Integrity   Goal: Tissue integrity - skin and mucous membranes   Structural intactness and normal physiological function of skin and  mucous membranes. Outcome: Ongoing   Skin assessed for breakdown and redness, patient turned regularly, and heels elevated off of bed on pillows.

## 2018-09-09 NOTE — FLOWSHEET NOTE
was contacted via [employer] to talk with patient's family at bedside and offer \"holy water,\" per family request. [address] provided supportive presence and active listening as patient's daughter expressed her grief. · Patient's daughter and her boyfriend were receptive to  as they had anticipated visit. Patient's boyfriend seems to be a good support to patient's daughter during this time. · Patient's daughter vented feelings of grief, anger, and sadness over patient's condition as well as the situations leading up to patient's hospitalization. · Patient's daughter is POA-H and spoke of the difficult dynamics with patient's boyfriend, however, she indicated good support from her extended family, who had been estranged previously. · Patient's daughter expressed realistic emotions regarding end of life, including sharing about patient's life as well as their plans for cremation after death. Patient's daughter requested holy water for bedside ritual to ensure patient understands that it is [place] to go. \"    Gordo Marsh will continue to follow patient per Palliative Care List.     09/08/18 2201   Encounter Summary   Services provided to: Family   Referral/Consult From: Nurse;Family   Support System Family members   Continue Visiting (9/8/2018)   Complexity of Encounter High   Length of Encounter 45 minutes   Spiritual Assessment Completed Yes   Grief and Life Adjustment   Type Grief and loss; End of life   Assessment Approachable;Tearful;Grieving; Anxious; Loneliness; Helplessness; Concerns with suffering   Intervention Active listening;Explored feelings, thoughts, concerns;Ritual;Sustaining presence/ Ministry of presence; End of life care;Grief care   Outcome Comfort;Expressed gratitude;Encouraged;Receptive      Shaneka Donovan

## 2018-09-09 NOTE — PLAN OF CARE
Problem: Falls - Risk of:  Goal: Will remain free from falls  Will remain free from falls   Outcome: Ongoing  Pt assessed as a fall risk this shift. Remains free from falls and accidental injury at this time. Fall precautions in place, including falling star sign and fall risk band on pt. Floor free from obstacles, and bed is locked and in lowest position. Adequate lighting provided. Pt encouraged to call before getting Out Of Bed for any need. Bed alarm activated. Will continue to monitor needs during hourly rounding, and reinforce education on use of call light.

## 2018-09-10 VITALS
HEART RATE: 103 BPM | TEMPERATURE: 99.1 F | OXYGEN SATURATION: 98 % | RESPIRATION RATE: 24 BRPM | DIASTOLIC BLOOD PRESSURE: 68 MMHG | SYSTOLIC BLOOD PRESSURE: 118 MMHG

## 2018-09-10 PROCEDURE — 6360000002 HC RX W HCPCS: Performed by: NURSE PRACTITIONER

## 2018-09-10 PROCEDURE — 2500000003 HC RX 250 WO HCPCS: Performed by: NURSE PRACTITIONER

## 2018-09-10 PROCEDURE — 99239 HOSP IP/OBS DSCHRG MGMT >30: CPT | Performed by: FAMILY MEDICINE

## 2018-09-10 PROCEDURE — 6360000002 HC RX W HCPCS: Performed by: INTERNAL MEDICINE

## 2018-09-10 RX ORDER — ACETAMINOPHEN 650 MG/1
650 SUPPOSITORY RECTAL EVERY 4 HOURS PRN
Status: DISCONTINUED | OUTPATIENT
Start: 2018-09-10 | End: 2018-09-10 | Stop reason: HOSPADM

## 2018-09-10 RX ADMIN — MORPHINE SULFATE 2 MG: 2 INJECTION, SOLUTION INTRAMUSCULAR; INTRAVENOUS at 04:39

## 2018-09-10 RX ADMIN — GLYCOPYRROLATE 0.1 MG: 0.2 INJECTION INTRAMUSCULAR; INTRAVENOUS at 02:53

## 2018-09-10 RX ADMIN — MORPHINE SULFATE 2 MG: 2 INJECTION, SOLUTION INTRAMUSCULAR; INTRAVENOUS at 02:49

## 2018-09-10 RX ADMIN — MORPHINE SULFATE 2 MG: 2 INJECTION, SOLUTION INTRAMUSCULAR; INTRAVENOUS at 15:44

## 2018-09-10 RX ADMIN — MORPHINE SULFATE 2 MG: 2 INJECTION, SOLUTION INTRAMUSCULAR; INTRAVENOUS at 19:24

## 2018-09-10 RX ADMIN — LORAZEPAM 1 MG: 2 INJECTION INTRAMUSCULAR; INTRAVENOUS at 11:14

## 2018-09-10 RX ADMIN — GLYCOPYRROLATE 0.1 MG: 0.2 INJECTION INTRAMUSCULAR; INTRAVENOUS at 11:15

## 2018-09-10 RX ADMIN — MORPHINE SULFATE 4 MG: 2 INJECTION, SOLUTION INTRAMUSCULAR; INTRAVENOUS at 17:33

## 2018-09-10 RX ADMIN — LORAZEPAM 1 MG: 2 INJECTION INTRAMUSCULAR; INTRAVENOUS at 19:28

## 2018-09-10 RX ADMIN — LORAZEPAM 1 MG: 2 INJECTION INTRAMUSCULAR; INTRAVENOUS at 14:06

## 2018-09-10 RX ADMIN — MORPHINE SULFATE 2 MG: 2 INJECTION, SOLUTION INTRAMUSCULAR; INTRAVENOUS at 11:15

## 2018-09-10 RX ADMIN — MORPHINE SULFATE 2 MG: 2 INJECTION, SOLUTION INTRAMUSCULAR; INTRAVENOUS at 14:06

## 2018-09-10 RX ADMIN — MORPHINE SULFATE 2 MG: 2 INJECTION, SOLUTION INTRAMUSCULAR; INTRAVENOUS at 07:30

## 2018-09-10 RX ADMIN — LORAZEPAM 1 MG: 2 INJECTION INTRAMUSCULAR; INTRAVENOUS at 09:37

## 2018-09-10 RX ADMIN — MORPHINE SULFATE 2 MG: 2 INJECTION, SOLUTION INTRAMUSCULAR; INTRAVENOUS at 09:37

## 2018-09-10 ASSESSMENT — PAIN SCALES - PAIN ASSESSMENT IN ADVANCED DEMENTIA (PAINAD)
BREATHING: 0
NEGVOCALIZATION: 0
FACIALEXPRESSION: 2
CONSOLABILITY: 0
BODYLANGUAGE: 0
NEGVOCALIZATION: 0
BODYLANGUAGE: 1
CONSOLABILITY: 0
BODYLANGUAGE: 1
BODYLANGUAGE: 1
CONSOLABILITY: 0
BREATHING: 0
FACIALEXPRESSION: 2
BREATHING: 0
CONSOLABILITY: 0
CONSOLABILITY: 0
FACIALEXPRESSION: 2
CONSOLABILITY: 0
BODYLANGUAGE: 1
TOTALSCORE: 3
BREATHING: 0
FACIALEXPRESSION: 2
FACIALEXPRESSION: 2
TOTALSCORE: 3
FACIALEXPRESSION: 2
NEGVOCALIZATION: 0
FACIALEXPRESSION: 2
BREATHING: 0
NEGVOCALIZATION: 0
TOTALSCORE: 3
BREATHING: 0
FACIALEXPRESSION: 2
BODYLANGUAGE: 1
FACIALEXPRESSION: 2
BREATHING: 0
CONSOLABILITY: 0
BREATHING: 0
CONSOLABILITY: 0
NEGVOCALIZATION: 0
TOTALSCORE: 3
FACIALEXPRESSION: 2
BODYLANGUAGE: 1
BODYLANGUAGE: 1
CONSOLABILITY: 0
TOTALSCORE: 3
CONSOLABILITY: 0
FACIALEXPRESSION: 2
NEGVOCALIZATION: 0
NEGVOCALIZATION: 0
FACIALEXPRESSION: 2
BREATHING: 0
BODYLANGUAGE: 1
BODYLANGUAGE: 1
TOTALSCORE: 3
BODYLANGUAGE: 1
FACIALEXPRESSION: 2
BREATHING: 0
NEGVOCALIZATION: 0
BODYLANGUAGE: 1
CONSOLABILITY: 0
BREATHING: 0
CONSOLABILITY: 0
NEGVOCALIZATION: 0
NEGVOCALIZATION: 0
BODYLANGUAGE: 1
TOTALSCORE: 3
BREATHING: 0
TOTALSCORE: 3
CONSOLABILITY: 0
NEGVOCALIZATION: 0
BREATHING: 0
FACIALEXPRESSION: 0
NEGVOCALIZATION: 0
NEGVOCALIZATION: 0
BODYLANGUAGE: 1
BODYLANGUAGE: 1
NEGVOCALIZATION: 0
BREATHING: 0
NEGVOCALIZATION: 0
TOTALSCORE: 0
TOTALSCORE: 3
FACIALEXPRESSION: 2
NEGVOCALIZATION: 0
TOTALSCORE: 3
BREATHING: 0
FACIALEXPRESSION: 2
TOTALSCORE: 3
CONSOLABILITY: 0
BREATHING: 0
TOTALSCORE: 3
BODYLANGUAGE: 1
BREATHING: 0
BODYLANGUAGE: 0
BODYLANGUAGE: 1
NEGVOCALIZATION: 0
CONSOLABILITY: 0
FACIALEXPRESSION: 2
TOTALSCORE: 3
TOTALSCORE: 3
FACIALEXPRESSION: 2
BODYLANGUAGE: 1
NEGVOCALIZATION: 0
CONSOLABILITY: 0
BODYLANGUAGE: 1
NEGVOCALIZATION: 0
BODYLANGUAGE: 1
FACIALEXPRESSION: 2
BREATHING: 0
BREATHING: 0
BODYLANGUAGE: 1
CONSOLABILITY: 0
BREATHING: 0
TOTALSCORE: 3
CONSOLABILITY: 0
CONSOLABILITY: 0
TOTALSCORE: 3
TOTALSCORE: 3
BREATHING: 0
TOTALSCORE: 3
CONSOLABILITY: 0
BREATHING: 1
TOTALSCORE: 3
NEGVOCALIZATION: 0
NEGVOCALIZATION: 0
CONSOLABILITY: 0
NEGVOCALIZATION: 0
CONSOLABILITY: 0

## 2018-09-10 ASSESSMENT — PAIN SCALES - GENERAL
PAINLEVEL_OUTOF10: 7
PAINLEVEL_OUTOF10: 6
PAINLEVEL_OUTOF10: 0
PAINLEVEL_OUTOF10: 0
PAINLEVEL_OUTOF10: 5
PAINLEVEL_OUTOF10: 0
PAINLEVEL_OUTOF10: 5
PAINLEVEL_OUTOF10: 6
PAINLEVEL_OUTOF10: 6
PAINLEVEL_OUTOF10: 7
PAINLEVEL_OUTOF10: 0
PAINLEVEL_OUTOF10: 3
PAINLEVEL_OUTOF10: 0
PAINLEVEL_OUTOF10: 4
PAINLEVEL_OUTOF10: 0

## 2018-09-10 ASSESSMENT — PAIN SCALES - WONG BAKER: WONGBAKER_NUMERICALRESPONSE: 0

## 2018-09-10 NOTE — PLAN OF CARE
Problem: Breathing Pattern - Ineffective:  Goal: Able to breathe comfortably  Able to breathe comfortably   Outcome: Ongoing    Goal: Absence of respiratory distress  Absence of respiratory distress   Outcome: Ongoing    Goal: Ability to achieve and maintain a regular respiratory rate will improve  Ability to achieve and maintain a regular respiratory rate will improve   Outcome: Ongoing    Goal: Minimal restlessness  Minimal restlessness   Outcome: Ongoing      Problem: Pain:  Goal: Pain level will decrease  Pain level will decrease   Outcome: Ongoing    Goal: Recognizes and communicates pain  Recognizes and communicates pain   Outcome: Ongoing      Problem: Pressure Ulcer:  Goal: Absence of pressure ulcer  Absence of pressure ulcer   Outcome: Ongoing    Goal: Decrease in pressure ulcer size  Decrease in pressure ulcer size   Outcome: Ongoing    Goal: Signs of wound healing will improve  Signs of wound healing will improve   Outcome: Ongoing

## 2018-09-10 NOTE — PROGRESS NOTES
[facility]      Daily Progress Note     Admit Date: 9/9/2018  Bed/Room No.  2017/2017-01  Admitting Physician : Maddison Kang MD  Code Status :Prior  Hospital Day:  LOS: 1 day   Chief Complaint:     Altered mental status  Principal Problem:    Acute ischemic right MCA stroke ([facility] [address]  Active Problems:    Right hemisphere, cerebral infarction ([address]    Somnolence    Acute respiratory failure ([address]  Resolved Problems:    * No resolved hospital problems. *    Subjective : Interval History/Significant events :  09/10/18    Patient is comfortable on IV morphine and glycopyrrolate for secretions . No fever / chills. Breathing on room air . Family at bed side and supportive . Vitals - Stable afebrile  Labs - no new labs . Nursing notes , Consults notes reviewed. Overnight events and updates discussed with Nursing staff . Background History:         Nallely Gross is 56 y.o. female  Who was admitted to the hospital on 9/9/2018 for treatment of Acute ischemic right MCA stroke ([facility] [address] Patient was admitted On 9/3/18 with altered mental status and acute respiratory failure thought to be from IV drug abuse from her . Patient had no response to Narcan. She was found to have a large stroke and right MCA with cerebral edema and herniation. Patient was also given vasopressors for hypotension. Patient was also treated with hypertonic saline and mannitol for cerebral edema. Family made change in code status to do not resuscitate and comfort care. Hospice was consulted and hospital bed changed to hospice. Patient has history of polysubstance abuse with tobacco, alcohol, heroin.      PMH:  Past Medical History:   Diagnosis Date    CAD (coronary artery disease)     CHF (congestive heart failure) ([facility] [facility]     Hypertension     MRSA (methicillin resistant staph aureus) culture positive 09/03/2018    nares    Seizure ([facility] [address]     Unspecified cerebral artery occlusion with cerebral 09/02/2018    INR 1.1 09/02/2018    LABA1C 6.0 02/07/2016       No results for input(s): WBC, HGB, HCT, PLT, SEDRATE, INR in the last 72 hours. Invalid input(s): PT  No results for input(s): NA, K, CL, CO2, GLUCOSE, BUN, CREATININE, MG, CALCIUM, CAION, PHOS, PSA in the last 72 hours. No results for input(s): PROT, LABALBU, LABA1C, B4MIRYG, C2VTINL, FT4, TSH, AST, ALT, LDH, GGT, ALKPHOS, BILITOT, BILIDIR, AMMONIA, AMYLASE, LIPASE, LACTATE, CHOL, HDL, LDLCHOLESTEROL, CHOLHDLRATIO, TRIG, VLDL, BNP, TROPONINI, CKTOTAL, CKMB, CKMBINDEX, RF, LISA in the last 72 hours. Lab Results   Component Value Date    LIPASE 26 02/07/2016     Lab Results   Component Value Date    CHOL 135 09/03/2018    CHOL 151 07/17/2015     Lab Results   Component Value Date    TRIG 65 09/03/2018    TRIG 84 07/17/2015     Lab Results   Component Value Date    HDL 50 09/03/2018    HDL 56 07/17/2015     Lab Results   Component Value Date    LDLCHOLESTEROL 72 09/03/2018    LDLCHOLESTEROL 78 07/17/2015     Lab Results   Component Value Date    VLDL NOT REPORTED 09/03/2018    VLDL NOT REPORTED 07/17/2015     Lab Results   Component Value Date    CHOLHDLRATIO 2.7 09/03/2018    CHOLHDLRATIO 2.7 07/17/2015         Specific Gravity, UA   Date Value Ref Range Status   09/02/2018 1.016 1.005 - 1.030 Final     Protein, UA   Date Value Ref Range Status   09/02/2018 NEGATIVE NEG Final     RBC, UA   Date Value Ref Range Status   09/02/2018 2 TO 5 0 - 4 /HPF Final     Comment:     Reference range defined for non-centrifuged specimen.      Bacteria, UA   Date Value Ref Range Status   09/02/2018 NOT REPORTED NONE Final     Nitrite, Urine   Date Value Ref Range Status   09/02/2018 NEGATIVE NEG Final     WBC, UA   Date Value Ref Range Status   09/02/2018 20 TO 50 0 - 5 /HPF Final     Leukocyte Esterase, Urine   Date Value Ref Range Status   09/02/2018 MODERATE (A) NEG Final   MRI brain 9/3/18  Large acute infarct in the right MCA distribution with midline shift and mass   effect as described above.  Midline shift is not changed compared to   prior/recent CT head       Old large MCA infarct on the left       Multiple smaller additional old infarcts are noted as well     CT Head  without contrast 9/2/18     Acute large right MCA territory infarction, involving the right frontal,   temporal, parietal lobes and the right basal ganglia/insular cortex. Associated mass effect and 4 mm right to left midline shift.       No acute intracranial hemorrhage.       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 Echocardiogram 9/4/18  EF 30%, mild LVH, and a moderately dilated, moderate mitral regurgitation. Cardiac cath 4/18/16-   Severe LAD, RCA disease - complete occlusion RCA, LAD with collaterals. Cardiac Arteries and Lesion Findings     LMCA: Mild irregularities 20-30%.     LAD: 100% mid segment occlusion,  with left to left collateral, similar  to before     LCx: 50% proximal stenosis     RCA: 100% mid segment occlusion,  and the PDA is filling distally via  large collateral from acute marginal branch. Clinical Course : unchanged  Assessment and Plan  :        1. Acute large right ischemic MCA stroke with cerebral edema and brain herniation  2. Acute respiratory failure secondary to #1  3. CAD - with complete RCA and LAD occlusion  4. ICM   5. Hypertension  6. Chronic systolic CHF    Continue comfort care . Morphine/ lorazepam . Tylenol . Continue to monitor vitals , Intake / output ,  Cell count , HGB , Kidney function, oxygenation  as indicated . Plan and updates discussed with patient's family ( daughter and sister Duong Benz  )  ,  answers  explained to satisfaction.    Plan discussed with Staff Alea RN     (Please note that portions of this note were completed with a voice recognition program. Efforts were made to edit the dictations but occasionally words are mis-transcribed.)      [facility] Sherryle Anger, MD  9/10/2018

## 2018-09-10 NOTE — DISCHARGE SUMMARY
[facility]      Discharge Summary     Patient ID: Jamari Mullen  :  1960   MRN: [id number]     ACCOUNT:  [de-identified]   Patient Location :   Patient's PCP: No primary care provider on file. Admit Date: 2018   Discharge Date:  09/10/18     Length of Stay: 1  Code Status:  Prior  Admitting Physician: Anastasia Coles MD  Discharge Physician: Leatha Calloway MD     Active Discharge Diagnosis :     Primary Problem  Acute ischemic right MCA stroke [facility])      [place] Problems    Diagnosis Date Noted    Acute ischemic right MCA stroke ([facility] [address] [I63.511] 2018    Somnolence [R40.0] 2016    Acute respiratory failure ([facility] [J96.00] 2016    Right hemisphere, cerebral infarction ([facility] [address] [I63.9] 2015       Admission Condition:  critical     Discharged Condition: terminal     Hospital Stay:     Hospital Course:  Jamari Mullen is a 56 y.o. female who was admitted for the management of   Acute ischemic right MCA stroke ([facility] [address] , presented to ER with altered mental status , somnolence. Patient was admitted On 9/3/18 with altered mental status and acute respiratory failure thought to be from IV drug abuse from her . Patient had no response to Narcan. She was found to have a large stroke and right MCA with cerebral edema and herniation. Patient was also given vasopressors for hypotension. Patient was also treated with hypertonic saline and mannitol for cerebral edema. Family made change in code status to do not resuscitate and comfort care. Hospice was consulted and hospital bed changed to hospice. Patient has history of polysubstance abuse with tobacco, alcohol, heroin, CAD . Significant therapeutic interventions:   1. Acute large right ischemic MCA stroke with cerebral edema and brain herniation -   2. Acute respiratory failure secondary to #1  3. CAD - with complete RCA and LAD occlusion  4.  ICM parietal lobes and the right basal ganglia/insular cortex. Associated mass effect and 4 mm right to left midline shift.       No acute intracranial hemorrhage.       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               Echocardiogram 9/4/18  EF 30%, mild LVH, and a moderately dilated, moderate mitral regurgitation.     Cardiac cath 4/18/16-   Severe LAD, RCA disease - complete occlusion RCA, LAD with collaterals.     Cardiac Arteries and Lesion Findings     LMCA: Mild irregularities 20-30%.     LAD: 100% mid segment occlusion,  with left to left collateral, similar  to before     LCx: 50% proximal stenosis     RCA: 100% mid segment occlusion,  and the PDA is filling distally via  large collateral from acute marginal branch. Consultations:    Consults:     Final Specialist Recommendations/Findings:   IP CONSULT TO IV TEAM      The patient was seen and examined on day of discharge and this discharge summary is in conjunction with any daily progress note from day of discharge. Discharge plan:     Disposition: hospice inpatient     Physician Follow Up: Lele Palacio MD  [address] [place] [address]  [phone]             Requiring Further Evaluation/Follow Up POST HOSPITALIZATION/Incidental Findings: follow up with hospice     Diet: NA . Patient comatose    Activity: patient comatose . Instructions to Patient:     Discharge Medications:      Medication List      You have not been prescribed any medications. Time Spent on discharge is  38 mins in patient examination, evaluation, counseling as well as medication reconciliation, prescriptions for required medications, discharge plan and follow up. Electronically signed by   Lillie Elise MD  9/10/2018        Thank you Dr. Geraldine Turner primary care provider on file. for the opportunity to be involved in this patient's care.

## 2018-09-10 NOTE — FLOWSHEET NOTE
Assessment:  Patient is in the dying process and will leave to go to hospice at 7:30 p.m. Kash Allen and her sister were present in the room,  Later Eduardo's significant other Azra Shepard came. Daughter talked about her feelings of being alienated from her mo, for sometime. She talked of how special this time is of being with her mom at the end of her life. Daughter is viviana felt theat there was more than enough prayer because it made her mom feel nervous. Amisha Ledbetter was very appreciative of the nurses and chaplains care for her mom and her during her time here. Intervention:   provided a ministry of presence and active listening to the patient. Plan:  Spiritual care is available to provide spiritual and emotional support.        09/10/18 1721   Encounter Summary   Services provided to: Patient and family together   Referral/Consult From: Other    Support System Family members   Place [facility] No   Continue Visiting (9/10/18)   Complexity of Encounter High   Length of Encounter 45 minutes   Spiritual Assessment Completed Yes   Grief and Life Adjustment   Type Grief and loss   Assessment Approachable;Tearful;Coping   Intervention Active listening;Sustaining presence/ Ministry of presence;Provided reading materials/devotional materials   Outcome Expressed gratitude

## 2018-09-17 NOTE — DISCHARGE SUMMARY
Neuro Critical Care   Discharge Summary      PATIENT NAME: Larry Banuelos  YOB: 1960  MEDICAL RECORD NO. [id number]  DATE: 9/17/2018  PRIMARY CARE PHYSICIAN: No primary care provider on file. DISCHARGE DATE: 9/8/18  DISCHARGE DIAGNOSIS:   Patient Active Problem List   Diagnosis Code    Right hemisphere, cerebral infarction ([facility] I63.9    Somnolence R40.0    CHF (congestive heart failure) ([facility]) I50.9    Heroin overdose T40.1X1A    Lactic acidosis E87.2    Hyperglycemia R73.9    Acute respiratory failure (HCC) J96.00    H/O essential hypertension Z86.79    Smoker F17.200    Seizure-like activity ([facility] [address] R56.9    Bilious vomiting with nausea R11.14    Urinary incontinence, urge N39.41    Acute cerebrovascular accident (CVA) ([facility] [address] I63.9    Midline shift of brain due to stroke G93.9    Hx of ischemic left MCA stroke Z86.73    Cerebrovascular accident (CVA) ([facility] [address] I63.9    Seizure ([facility] [address] R56.9    Encounter for palliative care Z51.5    Drug overdose T50.901A    Acute ischemic stroke ([facility] [address] I63.9    Acute ischemic right MCA stroke [facility]) I63.511       HOSPITAL COURSE   Initial Presentation (Admitted 9/2/18): The patient is a 55 yo female who presented to [facility] on 9/2/19 with AMS. Per significant other, patient has a history of IV drug abuse and she was using heroin that night. He found her unresponsive and brought her to the ED for evaluation. Patient was intubated emergently in the ED due to concerns for acute respiratory failure. Narcan administered without much improvement. In the ED, patient was only moving her left upper and lower extremity and had a right gaze deviation. CT Head showed large right MCA infarct with 4mm shift. CTA Head/Neck showed occlusion of M1 MCA.    She has a history of a large L MCA stroke in 07/2015 and was reportedly on anti-seizure medication for a seizure disorder.     Hospital Course:   9/3:  MRI Brain showed large R MCA territory infarct with stable midline shift. Treated with hyperosmolar therapy. On Vasopressor briefly overnight for hypotension. Loaded with Keppra and maintained on 500mg BID. Broad spectrum antibiotics. 9/4: Seizure overnight; Keppra increased to 1g BID.  9/5: CT Head showed subacute laerge right MCA infarct, little change in midline shift. Antibiotics stopped. Exam unchanged. Palliative Care consulted. 9/6: 3% discontinued, target NA >145. Failed ventilator weaning x2 days. 9/7: Family meeting involving Palliative Care and Neuro Critical Care team.  Son refused to participate in decision making leaving daughter as [place]. Daughter expressed wished to withdraw care and code status changed to [facility]. Extubated around 1800.       Hospice consulted on 9/8/18. Code status remains DNR-CC. Patient unstable and discharged/re-admitted as a Hospice conversion bed per family request.  Neuro Critical Care signed off care to [facility]; Dr. Melita Martell accepted. PROCEDURES:    N/A    PHYSICAL EXAMINATION        Discharge Vitals:  axillary temperature is 99.1 °F (37.3 °C). Her blood pressure is 118/68 and her pulse is 103. Her respiration is 24 and oxygen saturation is 98%. CONSTITUTIONAL:  Toxic appearing. Does not open eyes or follow commands. HEAD:  normocephalic, atraumatic    EYES:  PERRL, Left gaze preference   ENT:  moist mucous membranes, drooling   NECK:  supple, symmetric   LUNGS:  Equal air entry bilaterally, clear   CARDIOVASCULAR:  normal s1 / s2, RRR, distal pulses intact   ABDOMEN:  Soft, no rigidity, normal bowel sounds   NEUROLOGIC:  Mental Status:  Does not open eyes or follow commands             Cranial Nerves:    III: Pupils:  equal, round, reactive to light  III,IV,VI: Extra Ocular Movements: abnormal - left gaze preference     Motor Exam:    No spontaneous movement in extremities. Painful stimuli not performed due to comfort care status.        LABS/IMAGING     No results for input(s): WBC, HGB, HCT, PLT, NA, K, CL, CO2, BUN, CREATININE in the last 72 hours. Xr Abdomen (kub) (single Ap View)    Result Date: 9/3/2018  EXAMINATION: SINGLE SUPINE XRAY VIEW(S) OF THE ABDOMEN 9/3/2018 3:53 am COMPARISON: None. HISTORY: ORDERING SYSTEM PROVIDED HISTORY: eval for foreign bodies, pre-MRI TECHNOLOGIST PROVIDED HISTORY: eval for foreign bodies, pre-MRI FINDINGS: Hyperdensities projecting over the midpelvis likely represent fibroid calcifications or phleboliths. Right femoral catheter and enteric tube projecting over left upper abdominal quadrant noted. Campos catheter tubing noted. Bowel gas pattern is nonobstructive/nonspecific. Osseous structures are without acute abnormality. Support lines and tubes as above. No evidence of bowel obstruction. Ct Head Wo Contrast    Result Date: 9/5/2018  EXAMINATION: CT OF THE HEAD WITHOUT CONTRAST  9/5/2018 2:06 pm TECHNIQUE: CT of the head was performed without the administration of intravenous contrast. Dose modulation, iterative reconstruction, and/or weight based adjustment of the mA/kV was utilized to reduce the radiation dose to as low as reasonably achievable. COMPARISON: MRI September 3, 2018, CT September 2, 2018. HISTORY: ORDERING SYSTEM PROVIDED HISTORY: re-evaluate CVA TECHNOLOGIST PROVIDED HISTORY: FINDINGS: BRAIN/VENTRICLES: Large territory right MCA ischemia is again demonstrated. Cerebral edema with midline shift measuring 6 mm is noted (previously 4 mm), however mass-effect on the right lateral ventricle is more prominent in the interval.  No acute blood products identified. Old left MCA infarct is again demonstrated. Old cerebellar ischemic changes again noted. There is no extra-axial fluid. ORBITS: The visualized portion of the orbits demonstrate no acute abnormality. SINUSES: Mucosal thickening in the left maxillary sinus has developed. SOFT TISSUES/SKULL:  No acute abnormality of the visualized skull or soft tissues.      Evolution of subacute large territory right MCA infarct without acute blood products identified. Cerebral edema appears more prominent in the interval with little interval change in midline shift. Chronic findings as above. Ct Head Wo Contrast    Result Date: 9/2/2018  EXAMINATION: CT OF THE HEAD WITHOUT CONTRAST  9/2/2018 8:55 pm TECHNIQUE: CT of the head was performed without the administration of intravenous contrast. Dose modulation, iterative reconstruction, and/or weight based adjustment of the mA/kV was utilized to reduce the radiation dose to as low as reasonably achievable. COMPARISON: MRI brain February 7, 2016 HISTORY: ORDERING SYSTEM PROVIDED HISTORY: STROKE TECHNOLOGIST PROVIDED HISTORY: FINDINGS: BRAIN/VENTRICLES: There is large area of decreased gray-white differentiation in the right MCA territory involving the right frontal, temporal and parietal lobes, and the right basilar and insula cortex, consistent with acute large right MCA territory infarction. There is associated mass effect with decreased sulcation and compression of the right lateral ventricle. There is 4 mm right to left midline shift. There is large old left MCA territory infarction, involving the left frontal, parietal and temporal lobes. There are old infarctions in the bilateral cerebellar hemispheres, left more than right. There is parenchymal volume loss. There is periventricular white matter low attenuation, likely related to mild chronic microvascular disease. There is no acute intracranial hemorrhage or abnormal extra-axial collection. There is no evidence of hydrocephalus. ORBITS: There is mild air-fluid level in the left maxillary sinus. The remainder of the visualized portion of the orbits demonstrate no acute abnormality. SINUSES: The visualized paranasal sinuses and mastoid air cells demonstrate no acute abnormality. SOFT TISSUES/SKULL:  No acute abnormality of the visualized skull or soft tissues.      Acute large right MCA territory infarction, involving the right frontal, temporal, parietal lobes and the right basal ganglia/insular cortex. Associated mass effect and 4 mm right to left midline shift. No acute intracranial hemorrhage. 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 The results were reported to Dr. Pao Dsouza at 9:09 p.m. on September 2, 2018. Xr Chest Portable    Result Date: 9/6/2018  EXAMINATION: SINGLE XRAY VIEW OF THE CHEST 9/6/2018 5:48 am COMPARISON: September 4, 2018 HISTORY: ORDERING SYSTEM PROVIDED HISTORY: evaluate for acute process, intubated FINDINGS: Endotracheal tube is in place with tip approximately 2 cm above the lucio. Enteric tube is in satisfactory position. No consolidation, effusion or pneumothorax. Normal heart size. Calcification of the aortic knob. Osseous structures grossly intact. Satisfactory endotracheal and enteric tube positions. No focal airspace disease. Xr Chest Portable    Result Date: 9/4/2018  EXAMINATION: SINGLE XRAY VIEW OF THE CHEST 9/4/2018 1:49 am COMPARISON: 09/02/2018 HISTORY: ORDERING SYSTEM PROVIDED HISTORY: check ETT placement after advancement TECHNOLOGIST PROVIDED HISTORY: check ETT placement after advancement FINDINGS: Endotracheal tube has been advanced with tip now 4.5 cm from the lucio. Enteric tube is present. The proximal side port is in the distal esophagus. Advancement by 5 cm should be considered. Trachea is essentially midline. No lung infiltrate or consolidation. No definite pneumothorax or pleural effusion. Heart size is normal.     1. Endotracheal tube terminates 4.5 cm from the lucio. 2. Enteric tube with proximal side-port in the distal esophagus. Advancement by 5 cm should be considered.      Xr Chest Portable    Result Date: 9/3/2018  EXAMINATION: SINGLE XRAY VIEW OF THE CHEST, 9/2/2018 8:41 pm COMPARISON: Chest x-ray dated 04/16/2016 HISTORY: ORDERING SYSTEM PROVIDED HISTORY: SOB TECHNOLOGIST PROVIDED HISTORY: SOB FINDINGS: Endotracheal tube is in place with distal tip approximately 7.5 cm above the lucio. Enteric tube extends below the left hemidiaphragm with distal tip overlying the proximal stomach. The cardiomediastinal silhouette and pulmonary vasculature are within normal limits. Lungs are hyperinflated. No focal airspace consolidation, pneumothorax, or pleural effusion. Nipple shadow is noted overlying the mid right lung. No evidence of acute osseous abnormality. No free air beneath the diaphragm. 1. Endotracheal tube with distal tip approximately 7.5 cm above the lucio. Enteric tube in appropriate position. 2. No evidence of acute intrathoracic process. 3. COPD. Cta Neck W Contrast    Result Date: 9/2/2018  EXAMINATION: CTA OF THE HEAD WITH CONTRAST; CTA OF THE NECK 9/2/2018 9:08 pm: TECHNIQUE: CTA of the head/brain was performed with the administration of intravenous contrast. Multiplanar reformatted images are provided for review. MIP images are provided for review. Dose modulation, iterative reconstruction, and/or weight based adjustment of the mA/kV was utilized to reduce the radiation dose to as low as reasonably achievable.; CTA of the neck was performed with the administration of intravenous contrast. Multiplanar reformatted images are provided for review. MIP images are provided for review. Stenosis of the internal carotid arteries measured using NASCET criteria. Dose modulation, iterative reconstruction, and/or weight based adjustment of the mA/kV was utilized to reduce the radiation dose to as low as reasonably achievable. COMPARISON: CT head September 2018, CTA head and neck July 16, 2015 HISTORY: ORDERING SYSTEM PROVIDED HISTORY: STROKE TECHNOLOGIST PROVIDED HISTORY: FINDINGS: CTA NECK: AORTIC ARCH/ARCH VESSELS: There is a normal branch pattern of the aortic arch. There is thrombus at the origin of the right subclavian artery, contributing to near occlusion.   The left subclavian artery is patent without flow limiting stenosis. CAROTID ARTERIES: The common carotid arteries are normal in appearance without evidence of a flow limiting stenosis. The internal carotid arteries are normal in appearance without evidence of a flow limiting stenosis by NASCET criteria. No dissection or arterial injury is seen. VERTEBRAL ARTERIES: The vertebral arteries both arise from the subclavian arteries. There is left dominance of the vertebral arteries. The left vertebral artery is within normal limits, without flow limiting stenosis. There is hypoplastic right vertebral artery, with minimal contrast opacification in the V2 segment and nonvisualized V1 segment, stable since July 16, 2015. SOFT TISSUES: The lung apices are clear. No cervical or superior mediastinal lymphadenopathy. The visualized portion of the larynx and pharynx appear unremarkable. The parotid, submandibular and thyroid glands demonstrate no acute abnormality. BONES: The visualized osseous structures appear unremarkable. CTA HEAD: ANTERIOR CIRCULATION: There is up to 50% focal stenosis in the left cavernous internal carotid artery. The right cavernous internal carotid artery and the bilateral supraclinoid internal carotid arteries are within normal limits. There is occlusion of right MCA at the proximal aspect of the M1 segment. The anterior cerebral and left middle cerebral ar artery demonstrate no focal stenosis. POSTERIOR CIRCULATION: The posterior cerebral arteries demonstrate no focal stenosis. There is left dominance of the vertebral arteries. The vertebral and basilar arteries appear unremarkable. BRAIN: There is known acute large right MCA territory infarction, involving the right frontal, parietal and temporal lobes, the right basal ganglia and insular cortex. There is associated mass effect and 5 mm right left midline shift. There is large old left MCA territory infarction.   There are smaller infarctions involving the left bilateral cerebellar hemispheres, left more than right. Occlusion at the proximal aspect of the M1 segment of the right MCA, likely contributing to known acute large right MCA territory infarction. Thrombus at the proximal aspect of the right subclavian artery, contributing to near occlusion. Hypoplastic right vertebral artery, with minimal contrast opacification in the V2 segment and nonvisualized V1 segment, stable since July 16, 2015. 50% focal stenosis in the left cavernous internal carotid artery secondary to atherosclerotic calcification. The bilateral common and cervical internal carotid arteries are patent without focal stenosis. The results were reported to Dr. Loida Duval at 9:54 p.m. on September 2, 2018. Cta Head W Contrast    Result Date: 9/2/2018  EXAMINATION: CTA OF THE HEAD WITH CONTRAST; CTA OF THE NECK 9/2/2018 9:08 pm: TECHNIQUE: CTA of the head/brain was performed with the administration of intravenous contrast. Multiplanar reformatted images are provided for review. MIP images are provided for review. Dose modulation, iterative reconstruction, and/or weight based adjustment of the mA/kV was utilized to reduce the radiation dose to as low as reasonably achievable.; CTA of the neck was performed with the administration of intravenous contrast. Multiplanar reformatted images are provided for review. MIP images are provided for review. Stenosis of the internal carotid arteries measured using NASCET criteria. Dose modulation, iterative reconstruction, and/or weight based adjustment of the mA/kV was utilized to reduce the radiation dose to as low as reasonably achievable. COMPARISON: CT head September 2018, CTA head and neck July 16, 2015 HISTORY: ORDERING SYSTEM PROVIDED HISTORY: STROKE TECHNOLOGIST PROVIDED HISTORY: FINDINGS: CTA NECK: AORTIC ARCH/ARCH VESSELS: There is a normal branch pattern of the aortic arch.   There is thrombus at the origin of the right subclavian artery, contributing to near occlusion. The left subclavian artery is patent without flow limiting stenosis. CAROTID ARTERIES: The common carotid arteries are normal in appearance without evidence of a flow limiting stenosis. The internal carotid arteries are normal in appearance without evidence of a flow limiting stenosis by NASCET criteria. No dissection or arterial injury is seen. VERTEBRAL ARTERIES: The vertebral arteries both arise from the subclavian arteries. There is left dominance of the vertebral arteries. The left vertebral artery is within normal limits, without flow limiting stenosis. There is hypoplastic right vertebral artery, with minimal contrast opacification in the V2 segment and nonvisualized V1 segment, stable since July 16, 2015. SOFT TISSUES: The lung apices are clear. No cervical or superior mediastinal lymphadenopathy. The visualized portion of the larynx and pharynx appear unremarkable. The parotid, submandibular and thyroid glands demonstrate no acute abnormality. BONES: The visualized osseous structures appear unremarkable. CTA HEAD: ANTERIOR CIRCULATION: There is up to 50% focal stenosis in the left cavernous internal carotid artery. The right cavernous internal carotid artery and the bilateral supraclinoid internal carotid arteries are within normal limits. There is occlusion of right MCA at the proximal aspect of the M1 segment. The anterior cerebral and left middle cerebral ar artery demonstrate no focal stenosis. POSTERIOR CIRCULATION: The posterior cerebral arteries demonstrate no focal stenosis. There is left dominance of the vertebral arteries. The vertebral and basilar arteries appear unremarkable. BRAIN: There is known acute large right MCA territory infarction, involving the right frontal, parietal and temporal lobes, the right basal ganglia and insular cortex. There is associated mass effect and 5 mm right left midline shift.   There is large old left MCA territory infarction. There are smaller infarctions involving the left bilateral cerebellar hemispheres, left more than right. Occlusion at the proximal aspect of the M1 segment of the right MCA, likely contributing to known acute large right MCA territory infarction. Thrombus at the proximal aspect of the right subclavian artery, contributing to near occlusion. Hypoplastic right vertebral artery, with minimal contrast opacification in the V2 segment and nonvisualized V1 segment, stable since July 16, 2015. 50% focal stenosis in the left cavernous internal carotid artery secondary to atherosclerotic calcification. The bilateral common and cervical internal carotid arteries are patent without focal stenosis. The results were reported to Dr. Fina Mckeon at 9:54 p.m. on September 2, 2018. Mri Brain Without Contrast    Result Date: 9/3/2018  EXAMINATION: MRI OF THE BRAIN WITHOUT CONTRAST  9/3/2018 5:26 am TECHNIQUE: Multiplanar multisequence MRI of the brain was performed without the administration of intravenous contrast. COMPARISON: None. HISTORY: ORDERING SYSTEM PROVIDED HISTORY: ischemic stroke FINDINGS: INTRACRANIAL STRUCTURES/VENTRICLES: Left lateral ventricle is larger than the right. Midline shift from right to left is very similar compared to recent CT exam.  There is a large amount of left hemispheric encephalomalacia with surrounding high signal, likely gliosis. Encephalomalacia in the left cerebellar hemisphere is also noted. Small old infarcts are noted in the right cerebellar hemisphere is well. Increased signal in the anterior leftward aspect of the midbrain and sandra is noted associated with volume loss suggesting wallerian degeneration. There is a large area of increased T2, FLAIR, and diffusion signal hyperintensity in the right middle cerebral artery distribution involving cortex and white matter. This extends from the cortex deep to the margin of the right lateral ventricle in the caudate region. There is associated mass effect on the right lateral ventricle and midline shift from right to left. There is no acute hemorrhage. Right MCA flow void is not seen. ORBITS: Radiographic proptosis is noted bilaterally. SINUSES: Left maxillary sinus mucosal thickening and a small amount of left maxillary sinus fluid is noted. BONES/SOFT TISSUES: The bone marrow signal intensity appears normal. The soft tissues demonstrate no acute abnormality. Large acute infarct in the right MCA distribution with midline shift and mass effect as described above.   Midline shift is not changed compared to prior/recent CT head Old large MCA infarct on the left Multiple smaller additional old infarcts are noted as well         DISCHARGE INSTRUCTIONS     Discharge Medications:     Comfort Care Medications Only;  PRN Morphine/Ativan/Robinol/Bisacodyl    Follow-up:  N/A Hospice  Time Spent for discharge: 20 minutes    [employer], APRN - CNP  9/17/2018, 10:59 AM

## 2020-11-03 PROBLEM — I63.9 CEREBROVASCULAR ACCIDENT (CVA) (HCC): Status: RESOLVED | Noted: 2020-11-03 | Resolved: 2020-11-03
